# Patient Record
Sex: FEMALE | Race: WHITE | NOT HISPANIC OR LATINO | Employment: OTHER | URBAN - METROPOLITAN AREA
[De-identification: names, ages, dates, MRNs, and addresses within clinical notes are randomized per-mention and may not be internally consistent; named-entity substitution may affect disease eponyms.]

---

## 2017-03-16 ENCOUNTER — ALLSCRIPTS OFFICE VISIT (OUTPATIENT)
Dept: OTHER | Facility: OTHER | Age: 72
End: 2017-03-16

## 2017-03-22 ENCOUNTER — ALLSCRIPTS OFFICE VISIT (OUTPATIENT)
Dept: OTHER | Facility: OTHER | Age: 72
End: 2017-03-22

## 2017-03-22 DIAGNOSIS — M79.604 PAIN OF RIGHT LEG: ICD-10-CM

## 2017-03-22 DIAGNOSIS — M79.605 PAIN OF LEFT LEG: ICD-10-CM

## 2017-03-22 DIAGNOSIS — S89.91XA INJURY OF RIGHT LOWER LEG: ICD-10-CM

## 2017-03-23 ENCOUNTER — TRANSCRIBE ORDERS (OUTPATIENT)
Dept: ADMINISTRATIVE | Facility: HOSPITAL | Age: 72
End: 2017-03-23

## 2017-03-23 ENCOUNTER — HOSPITAL ENCOUNTER (OUTPATIENT)
Dept: RADIOLOGY | Facility: HOSPITAL | Age: 72
Discharge: HOME | End: 2017-03-23
Payer: MEDICARE

## 2017-03-23 DIAGNOSIS — M79.604 PAIN OF RIGHT LEG: ICD-10-CM

## 2017-03-23 DIAGNOSIS — S89.91XA INJURY OF RIGHT LOWER LEG: ICD-10-CM

## 2017-03-23 DIAGNOSIS — M79.605 PAIN OF LEFT LEG: ICD-10-CM

## 2017-03-23 PROCEDURE — 73590 X-RAY EXAM OF LOWER LEG: CPT

## 2017-03-23 PROCEDURE — 73564 X-RAY EXAM KNEE 4 OR MORE: CPT

## 2017-03-24 LAB
A/G RATIO (HISTORICAL): 1.8 (CALC) (ref 1–2.5)
ALBUMIN SERPL BCP-MCNC: 4.3 G/DL (ref 3.6–5.1)
ALP SERPL-CCNC: 93 U/L (ref 33–130)
ALT SERPL W P-5'-P-CCNC: 19 U/L (ref 6–29)
AST SERPL W P-5'-P-CCNC: 24 U/L (ref 10–35)
BASOPHILS # BLD AUTO: 0.6 %
BASOPHILS # BLD AUTO: 40 CELLS/UL (ref 0–200)
BILIRUB SERPL-MCNC: 0.6 MG/DL (ref 0.2–1.2)
BUN SERPL-MCNC: 37 MG/DL (ref 7–25)
BUN/CREA RATIO (HISTORICAL): 30 (CALC) (ref 6–22)
CALCIUM SERPL-MCNC: 9.5 MG/DL (ref 8.6–10.4)
CHLORIDE SERPL-SCNC: 111 MMOL/L (ref 98–110)
CO2 SERPL-SCNC: 20 MMOL/L (ref 20–31)
CREAT SERPL-MCNC: 1.24 MG/DL (ref 0.6–0.93)
DEPRECATED RDW RBC AUTO: 13.9 % (ref 11–15)
EGFR AFRICAN AMERICAN (HISTORICAL): 51 ML/MIN/1.73M2
EGFR-AMERICAN CALC (HISTORICAL): 44 ML/MIN/1.73M2
EOSINOPHIL # BLD AUTO: 178 CELLS/UL (ref 15–500)
EOSINOPHIL # BLD AUTO: 2.7 %
GAMMA GLOBULIN (HISTORICAL): 2.4 G/DL (CALC) (ref 1.9–3.7)
GLUCOSE (HISTORICAL): 93 MG/DL (ref 65–99)
HCT VFR BLD AUTO: 37.1 % (ref 35–45)
HGB BLD-MCNC: 12.3 G/DL (ref 11.7–15.5)
LYMPHOCYTES # BLD AUTO: 1505 CELLS/UL (ref 850–3900)
LYMPHOCYTES # BLD AUTO: 22.8 %
MCH RBC QN AUTO: 31.2 PG (ref 27–33)
MCHC RBC AUTO-ENTMCNC: 33 G/DL (ref 32–36)
MCV RBC AUTO: 94.6 FL (ref 80–100)
MONOCYTES # BLD AUTO: 403 CELLS/UL (ref 200–950)
MONOCYTES (HISTORICAL): 6.1 %
NEUTROPHILS # BLD AUTO: 4475 CELLS/UL (ref 1500–7800)
NEUTROPHILS # BLD AUTO: 67.8 %
PLATELET # BLD AUTO: 218 THOUSAND/UL (ref 140–400)
PMV BLD AUTO: 9.5 FL (ref 7.5–12.5)
POTASSIUM SERPL-SCNC: 4.4 MMOL/L (ref 3.5–5.3)
RBC # BLD AUTO: 3.92 MILLION/UL (ref 3.8–5.1)
SODIUM SERPL-SCNC: 141 MMOL/L (ref 135–146)
TOTAL PROTEIN (HISTORICAL): 6.7 G/DL (ref 6.1–8.1)
WBC # BLD AUTO: 6.6 THOUSAND/UL (ref 3.8–10.8)

## 2017-03-27 ENCOUNTER — GENERIC CONVERSION - ENCOUNTER (OUTPATIENT)
Dept: OTHER | Facility: OTHER | Age: 72
End: 2017-03-27

## 2017-04-16 ENCOUNTER — LAB CONVERSION - ENCOUNTER (OUTPATIENT)
Dept: OTHER | Facility: OTHER | Age: 72
End: 2017-04-16

## 2017-04-16 LAB
CULTURE RESULT (HISTORICAL): NORMAL
CULTURE RESULT (HISTORICAL): NORMAL
SHIGA TOXIN TEST (HISTORICAL): NORMAL

## 2017-04-17 ENCOUNTER — GENERIC CONVERSION - ENCOUNTER (OUTPATIENT)
Dept: OTHER | Facility: OTHER | Age: 72
End: 2017-04-17

## 2017-05-10 ENCOUNTER — ALLSCRIPTS OFFICE VISIT (OUTPATIENT)
Dept: OTHER | Facility: OTHER | Age: 72
End: 2017-05-10

## 2017-05-17 ENCOUNTER — ALLSCRIPTS OFFICE VISIT (OUTPATIENT)
Dept: OTHER | Facility: OTHER | Age: 72
End: 2017-05-17

## 2017-05-24 ENCOUNTER — ALLSCRIPTS OFFICE VISIT (OUTPATIENT)
Dept: OTHER | Facility: OTHER | Age: 72
End: 2017-05-24

## 2017-06-10 ENCOUNTER — APPOINTMENT (EMERGENCY)
Dept: RADIOLOGY | Facility: HOSPITAL | Age: 72
End: 2017-06-10
Payer: MEDICARE

## 2017-06-10 ENCOUNTER — HOSPITAL ENCOUNTER (EMERGENCY)
Facility: HOSPITAL | Age: 72
Discharge: HOME | End: 2017-06-10
Attending: EMERGENCY MEDICINE | Admitting: EMERGENCY MEDICINE
Payer: MEDICARE

## 2017-06-10 VITALS
RESPIRATION RATE: 18 BRPM | OXYGEN SATURATION: 100 % | WEIGHT: 192 LBS | HEIGHT: 64 IN | SYSTOLIC BLOOD PRESSURE: 111 MMHG | HEART RATE: 49 BPM | DIASTOLIC BLOOD PRESSURE: 55 MMHG | BODY MASS INDEX: 32.78 KG/M2 | TEMPERATURE: 97.2 F

## 2017-06-10 DIAGNOSIS — Z87.898 HISTORY OF TACHYCARDIA: Primary | ICD-10-CM

## 2017-06-10 DIAGNOSIS — R00.1 BRADYCARDIA: ICD-10-CM

## 2017-06-10 LAB
ALBUMIN SERPL BCP-MCNC: 3.6 G/DL (ref 3.5–5)
ALP SERPL-CCNC: 72 U/L (ref 46–116)
ALT SERPL W P-5'-P-CCNC: 26 U/L (ref 12–78)
ANION GAP SERPL CALCULATED.3IONS-SCNC: 9 MMOL/L (ref 4–13)
AST SERPL W P-5'-P-CCNC: 36 U/L (ref 5–45)
BASOPHILS # BLD AUTO: 0.04 THOUSANDS/ΜL (ref 0–0.1)
BASOPHILS NFR BLD AUTO: 1 % (ref 0–1)
BILIRUB SERPL-MCNC: 0.3 MG/DL (ref 0.2–1)
BUN SERPL-MCNC: 28 MG/DL (ref 5–25)
CALCIUM SERPL-MCNC: 9.3 MG/DL (ref 8.3–10.1)
CHLORIDE SERPL-SCNC: 108 MMOL/L (ref 100–108)
CO2 SERPL-SCNC: 23 MMOL/L (ref 21–32)
CREAT SERPL-MCNC: 1.13 MG/DL (ref 0.6–1.3)
EOSINOPHIL # BLD AUTO: 0.1 THOUSAND/ΜL (ref 0–0.61)
EOSINOPHIL NFR BLD AUTO: 2 % (ref 0–6)
ERYTHROCYTE [DISTWIDTH] IN BLOOD BY AUTOMATED COUNT: 13.2 % (ref 11.6–15.1)
GFR SERPL CREATININE-BSD FRML MDRD: 47.5 ML/MIN/1.73SQ M
GLUCOSE SERPL-MCNC: 103 MG/DL (ref 65–140)
HCT VFR BLD AUTO: 35.7 % (ref 34.8–46.1)
HGB BLD-MCNC: 11.9 G/DL (ref 11.5–15.4)
LYMPHOCYTES # BLD AUTO: 1.45 THOUSANDS/ΜL (ref 0.6–4.47)
LYMPHOCYTES NFR BLD AUTO: 26 % (ref 14–44)
MCH RBC QN AUTO: 31.4 PG (ref 26.8–34.3)
MCHC RBC AUTO-ENTMCNC: 33.3 G/DL (ref 31.4–37.4)
MCV RBC AUTO: 94 FL (ref 82–98)
MONOCYTES # BLD AUTO: 0.46 THOUSAND/ΜL (ref 0.17–1.22)
MONOCYTES NFR BLD AUTO: 8 % (ref 4–12)
NEUTROPHILS # BLD AUTO: 3.59 THOUSANDS/ΜL (ref 1.85–7.62)
NEUTS SEG NFR BLD AUTO: 63 % (ref 43–75)
NT-PROBNP SERPL-MCNC: 614 PG/ML
PLATELET # BLD AUTO: 176 THOUSANDS/UL (ref 149–390)
PMV BLD AUTO: 10.5 FL (ref 8.9–12.7)
POTASSIUM SERPL-SCNC: 6.1 MMOL/L (ref 3.5–5.3)
PROT SERPL-MCNC: 6.9 G/DL (ref 6.4–8.2)
RBC # BLD AUTO: 3.79 MILLION/UL (ref 3.81–5.12)
SODIUM SERPL-SCNC: 140 MMOL/L (ref 136–145)
TROPONIN I SERPL-MCNC: <0.02 NG/ML
WBC # BLD AUTO: 5.64 THOUSAND/UL (ref 4.31–10.16)

## 2017-06-10 PROCEDURE — 93005 ELECTROCARDIOGRAM TRACING: CPT

## 2017-06-10 PROCEDURE — 71010 HB CHEST X-RAY 1 VIEW FRONTAL (PORTABLE): CPT

## 2017-06-10 PROCEDURE — 83880 ASSAY OF NATRIURETIC PEPTIDE: CPT | Performed by: EMERGENCY MEDICINE

## 2017-06-10 PROCEDURE — 99285 EMERGENCY DEPT VISIT HI MDM: CPT

## 2017-06-10 PROCEDURE — 80053 COMPREHEN METABOLIC PANEL: CPT | Performed by: EMERGENCY MEDICINE

## 2017-06-10 PROCEDURE — 84484 ASSAY OF TROPONIN QUANT: CPT | Performed by: EMERGENCY MEDICINE

## 2017-06-10 PROCEDURE — 36415 COLL VENOUS BLD VENIPUNCTURE: CPT | Performed by: EMERGENCY MEDICINE

## 2017-06-10 PROCEDURE — 85025 COMPLETE CBC W/AUTO DIFF WBC: CPT | Performed by: EMERGENCY MEDICINE

## 2017-06-10 RX ORDER — LISINOPRIL 20 MG/1
TABLET ORAL
COMMUNITY
End: 2018-05-02 | Stop reason: SDUPTHER

## 2017-06-10 RX ORDER — INDAPAMIDE 2.5 MG/1
TABLET, FILM COATED ORAL
COMMUNITY
End: 2018-08-13 | Stop reason: SDUPTHER

## 2017-06-10 RX ORDER — IBUPROFEN 800 MG/1
TABLET ORAL
COMMUNITY
End: 2018-07-17 | Stop reason: SDUPTHER

## 2017-06-10 RX ORDER — CITALOPRAM 10 MG/1
TABLET ORAL
COMMUNITY
Start: 2017-02-16 | End: 2018-02-06 | Stop reason: SDUPTHER

## 2017-06-10 RX ORDER — LORAZEPAM 0.5 MG/1
TABLET ORAL
COMMUNITY
Start: 2015-10-26 | End: 2019-02-27 | Stop reason: ALTCHOICE

## 2017-06-11 LAB
ATRIAL RATE: 46 BPM
P AXIS: 29 DEGREES
PR INTERVAL: 146 MS
QRS AXIS: 54 DEGREES
QRSD INTERVAL: 74 MS
QT INTERVAL: 428 MS
QTC INTERVAL: 374 MS
T WAVE AXIS: 56 DEGREES
VENTRICULAR RATE: 46 BPM

## 2017-07-19 ENCOUNTER — GENERIC CONVERSION - ENCOUNTER (OUTPATIENT)
Dept: OTHER | Facility: OTHER | Age: 72
End: 2017-07-19

## 2017-09-28 ENCOUNTER — GENERIC CONVERSION - ENCOUNTER (OUTPATIENT)
Dept: OTHER | Facility: OTHER | Age: 72
End: 2017-09-28

## 2017-11-02 ENCOUNTER — GENERIC CONVERSION - ENCOUNTER (OUTPATIENT)
Dept: OTHER | Facility: OTHER | Age: 72
End: 2017-11-02

## 2017-11-02 ENCOUNTER — ALLSCRIPTS OFFICE VISIT (OUTPATIENT)
Dept: OTHER | Facility: OTHER | Age: 72
End: 2017-11-02

## 2017-11-02 DIAGNOSIS — Z12.31 ENCOUNTER FOR SCREENING MAMMOGRAM FOR MALIGNANT NEOPLASM OF BREAST: ICD-10-CM

## 2017-11-02 DIAGNOSIS — Z13.820 ENCOUNTER FOR SCREENING FOR OSTEOPOROSIS: ICD-10-CM

## 2017-11-08 ENCOUNTER — GENERIC CONVERSION - ENCOUNTER (OUTPATIENT)
Dept: OTHER | Facility: OTHER | Age: 72
End: 2017-11-08

## 2017-11-08 ENCOUNTER — LAB CONVERSION - ENCOUNTER (OUTPATIENT)
Dept: OTHER | Facility: OTHER | Age: 72
End: 2017-11-08

## 2017-11-08 LAB
A/G RATIO (HISTORICAL): 1.7 (CALC) (ref 1–2.5)
ALBUMIN SERPL BCP-MCNC: 3.9 G/DL (ref 3.6–5.1)
ALP SERPL-CCNC: 72 U/L (ref 33–130)
ALT SERPL W P-5'-P-CCNC: 16 U/L (ref 6–29)
AST SERPL W P-5'-P-CCNC: 22 U/L (ref 10–35)
BILIRUB SERPL-MCNC: 0.5 MG/DL (ref 0.2–1.2)
BUN SERPL-MCNC: 29 MG/DL (ref 7–25)
BUN/CREA RATIO (HISTORICAL): 24 (CALC) (ref 6–22)
CALCIUM SERPL-MCNC: 8.9 MG/DL (ref 8.6–10.4)
CHLORIDE SERPL-SCNC: 110 MMOL/L (ref 98–110)
CO2 SERPL-SCNC: 23 MMOL/L (ref 20–31)
CREAT SERPL-MCNC: 1.19 MG/DL (ref 0.6–0.93)
EGFR AFRICAN AMERICAN (HISTORICAL): 53 ML/MIN/1.73M2
EGFR-AMERICAN CALC (HISTORICAL): 46 ML/MIN/1.73M2
GAMMA GLOBULIN (HISTORICAL): 2.3 G/DL (CALC) (ref 1.9–3.7)
GLUCOSE (HISTORICAL): 82 MG/DL (ref 65–99)
HEPATITIS C ANTIBODY (HISTORICAL): NORMAL
POTASSIUM SERPL-SCNC: 5 MMOL/L (ref 3.5–5.3)
SIGNAL TO CUT-OFF (HISTORICAL): 0.02
SODIUM SERPL-SCNC: 141 MMOL/L (ref 135–146)
TOTAL PROTEIN (HISTORICAL): 6.2 G/DL (ref 6.1–8.1)
TSH SERPL DL<=0.05 MIU/L-ACNC: 2.67 MIU/L (ref 0.4–4.5)

## 2017-11-28 ENCOUNTER — HOSPITAL ENCOUNTER (OUTPATIENT)
Dept: BONE DENSITY | Facility: IMAGING CENTER | Age: 72
Discharge: HOME | End: 2017-11-28
Payer: MEDICARE

## 2017-11-28 DIAGNOSIS — Z12.31 ENCOUNTER FOR SCREENING MAMMOGRAM FOR MALIGNANT NEOPLASM OF BREAST: ICD-10-CM

## 2017-11-28 DIAGNOSIS — Z13.820 ENCOUNTER FOR SCREENING FOR OSTEOPOROSIS: ICD-10-CM

## 2017-11-28 PROCEDURE — 77080 DXA BONE DENSITY AXIAL: CPT

## 2017-11-28 PROCEDURE — G0202 SCR MAMMO BI INCL CAD: HCPCS

## 2017-11-29 ENCOUNTER — GENERIC CONVERSION - ENCOUNTER (OUTPATIENT)
Dept: OTHER | Facility: OTHER | Age: 72
End: 2017-11-29

## 2018-01-11 NOTE — RESULT NOTES
Verified Results  (Q) CULTURE, STOOL, SAL/SHIG/CAMPY AND SHIGA TOXINS EIA W/RFL E COLI O157 CULT 12Apr2017 11:52AM [facility]     Test Name Result Flag Reference   SHIGA TOXINS, EIA W/RFL$TO E COLI O157 CULTURE      SHIGA TOXINS, EIA W/RFL TO E COLI O157 CULTURE         MICRO NUMBER:      [id number]    TEST STATUS:       FINAL    SPECIMEN SOURCE:   STOOL    SPECIMEN QUALITY:  ADEQUATE    RESULT:            Not Detected   CULTURE, STOOL, BASHIR/SHIG/CAMPY AND SHIGA TOXINS EIA W/RFL E COLI O157 CULT      CAMPYLOBACTER, CULTURE         MICRO NUMBER:      [id number]    TEST STATUS:       FINAL    SPECIMEN SOURCE:   STOOL    SPECIMEN QUALITY:  ADEQUATE    RESULT:            No enteric Campylobacter isolated   CULTURE, STOOL, BASHIR/SHIG/CAMPY AND SHIGA TOXINS EIA W/RFL E COLI O157 CULT      SALMONELLA AND SHIGELLA, CULTURE         MICRO NUMBER:      [id number]    TEST STATUS:       FINAL    SPECIMEN SOURCE:   STOOL    SPECIMEN QUALITY:  ADEQUATE    RESULT:            No Salmonella or Shigella isolated

## 2018-01-12 VITALS
DIASTOLIC BLOOD PRESSURE: 69 MMHG | BODY MASS INDEX: 33.32 KG/M2 | WEIGHT: 200 LBS | HEART RATE: 57 BPM | HEIGHT: 65 IN | SYSTOLIC BLOOD PRESSURE: 130 MMHG

## 2018-01-12 NOTE — MISCELLANEOUS
Message  KELLY CUI HAS BILATERAL KNEE OSTEOARTHRITIS AND SPINAL STENOSIS, BOTH OF WHICH CAUSE HER PAIN WITH PROLONGED STANDING  IT IS OF MY OPINION THAT SHE WOULD BE BEST ACCOMMODATED WITH USE OF A STOOL AS NEEDED WHILE SHE IS WORKING         Signatures   Electronically signed by : Lexy Urena, ; Jul 19 2017  2:17PM EST                       (Author)    Electronically signed by : Kike Membreno MD; Jul 19 2017  2:20PM EST                       (Author)

## 2018-01-12 NOTE — RESULT NOTES
Verified Results  (1) URINE CULTURE [id number] 12:00AM Delaine Dakins     Test Name Result Flag Reference   CULTURE, URINE, ROUTINE  A    CULTURE, URINE, ROUTINE         MICRO NUMBER:      [id number]    TEST STATUS:       FINAL    SPECIMEN SOURCE:   URINE    SPECIMEN QUALITY:  ADEQUATE    RESULT:            50,000-100,000 CFU/mL of Escherichia coli                            E coli                            ----------------                            INT   DELIA     AMOX/CLAVULANATE       S     <=2 0     AMPICILLIN             S     <=2 0     AMP/SULBACTAM          S     <=2 0     CEFAZOLIN              NR    <=4 0 **1     CEFEPIME               S     <=1 0     CEFTRIAXONE            S     <=1 0     CIPROFLOXACIN          S     <=0 25     ERTAPENEM              S     <=0 5     GENTAMICIN             S     <=1 0     IMIPENEM               S     <=0 25     LEVOFLOXACIN           S     <=0 12     NITROFURANTOIN         S     <=16 0     PIP/TAZOBACTAM         S     <=4 0     TOBRAMYCIN             S     <=1 0     TRIMETHOPRIM/SULFA     S     <=20 0  S=Susceptible  I=Intermediate  R=Resistant  * = Not Tested  NR = Not Reported  **NN = See Therapy Comments  THERAPY COMMENTS      Note 1:      ORAL therapy: A cefazolin DELIA of < 32 predicts      susceptibility to the oral agents cefaclor,      cefdinir, cefpodoxime, cefprozil, cefuroxime,      cephalexin, and loracarbef when used for therapy      of uncomplicated UTIs due to E  coli,      K  pneumoniae, and P  mirabilis  PARENTERAL therapy: A cefazolin DELIA of > 8      indicates resistance to parenteral cefazolin  An alternate test method must be performed to      to confirm susceptibility to parenteral cefazolin

## 2018-01-13 VITALS
DIASTOLIC BLOOD PRESSURE: 74 MMHG | SYSTOLIC BLOOD PRESSURE: 144 MMHG | HEIGHT: 65 IN | WEIGHT: 194 LBS | BODY MASS INDEX: 32.32 KG/M2 | HEART RATE: 54 BPM

## 2018-01-13 VITALS
SYSTOLIC BLOOD PRESSURE: 144 MMHG | WEIGHT: 194 LBS | BODY MASS INDEX: 32.32 KG/M2 | HEIGHT: 65 IN | DIASTOLIC BLOOD PRESSURE: 70 MMHG | HEART RATE: 61 BPM

## 2018-01-13 VITALS
WEIGHT: 194 LBS | DIASTOLIC BLOOD PRESSURE: 80 MMHG | BODY MASS INDEX: 32.32 KG/M2 | HEIGHT: 65 IN | SYSTOLIC BLOOD PRESSURE: 147 MMHG | HEART RATE: 59 BPM

## 2018-01-14 VITALS
BODY MASS INDEX: 32.32 KG/M2 | HEART RATE: 66 BPM | WEIGHT: 194 LBS | HEIGHT: 65 IN | SYSTOLIC BLOOD PRESSURE: 120 MMHG | RESPIRATION RATE: 16 BRPM | DIASTOLIC BLOOD PRESSURE: 60 MMHG | OXYGEN SATURATION: 98 %

## 2018-01-14 NOTE — RESULT NOTES
Message   no growth in urine     Verified Results  (1) URINE CULTURE [id number] 12:00AM [facility]     Test Name Result Flag Reference   CULTURE, URINE, ROUTINE      CULTURE, URINE, ROUTINE         MICRO NUMBER:      [id number]    TEST STATUS:       FINAL    SPECIMEN SOURCE:   URINE    SPECIMEN QUALITY:  ADEQUATE    RESULT:            No Growth

## 2018-01-15 NOTE — RESULT NOTES
Verified Results  * MAMMO SCREENING BILATERAL W CAD [id number] 02:54PM Ty Barger Order Number: [id number]    - Patient Instructions: To schedule this appointment, please contact Central Scheduling at [phone]  Do not wear any perfume, powder, lotion or deodorant on breast or underarm area  Please bring your doctors order, referral (if needed) and insurance information with you on the day of the test  Failure to bring this information may result in this test being rescheduled  Arrive 15 minutes prior to your appointment time to register  On the day of your test, please bring any prior mammogram or breast studies with you that were not performed at a [facility]  Failure to bring prior exams may result in your test needing to be rescheduled  Test Name Result Flag Reference   MAMMO SCREENING BILATERAL W CAD (Report)     Patient History:   Patient is postmenopausal    Family history of breast cancer in mother  Patient has never smoked  Patient's BMI is 34 8  Reason for exam: screening, asymptomatic  Mammo Screening Bilateral W CAD: November 28, 2017 - Check In #:    [de-identified]   Bilateral CC and MLO view(s) were taken  Technologist: JAMEY Wilkinson (R)(M)   No prior studies available for comparison  The breast tissue is almost entirely fat  Bilateral digital mammography was performed  No dominant soft    tissue mass, architectural distortion or suspicious    calcifications are noted in either breast  The skin and nipple    contours are within normal limits  No evidence of malignancy  No significant changes when compared with prior studies  ACR BI-RADSï¾® Assessments: BiRad:1 - Negative     Recommendation:   Routine screening mammogram of both breasts in 1 year  Analyzed by CAD     The patient is scheduled in a reminder system for screening    mammography  8-10% of cancers will be missed on mammography   Management of a    palpable abnormality must be based on clinical grounds  Patients   will be notified of their results via letter from our facility  Accredited by [employer] of [employer] and FDA  Transcription Location: JAMEY Bradley 98: [id number]     Risk Value(s):   Tyrer-Cuzick 10 Year: 6 000%, Tyrer-Cuzick Lifetime: 8 600%,    Myriad Table: 1 5%, JORJE 5 Year: 3 3%, NCI Lifetime: 9 1%   Signed by:   Ghada Cervantes MD   11/28/17     * DXA BONE DENSITY SPINE HIP AND PELVIS [id number] 02:54PM Don Easton Order Number: [id number]    - Patient Instructions: To schedule this appointment, please contact Central Scheduling at [phone]  Test Name Result Flag Reference   DXA BONE DENSITY SPINE HIP AND PELVIS (Report)     CENTRAL DXA SCAN     CLINICAL HISTORY:  71year old post-menopausal  female risk factors include family history of maternal hip fracture  Personal history of shoulder fracture  Osteoporosis screening  TECHNIQUE: Bone densitometry was performed using a Horizon A bone densitometer  Regions of interest appear properly placed  There are no obvious fractures or other confounding variables which could limit the study  Degenerative changes of the lumbar    spine and hip  This will falsely elevate the bone mineral densities in these regions  COMPARISON: None  RESULTS:    LUMBAR SPINE: L1-L4:   BMD 1 572 gm/cm2   T-score 4 8   Z-score 7 0     LEFT TOTAL HIP:   BMD 0 806 gm/cm2   T-score -1 1   Z-score 0 5     LEFT FEMORAL NECK:   BMD 0 563 gm/cm2   T-score -2 6   Z-score -0 7             IMPRESSION:   1  Based on the [facility] classification, the T-score of -2 6 in the left hip is consistent with OSTEOPOROSIS  Although not part of the [facility] classification, the presence of a fragility fracture (stress fracture) in conjunction with a bone density examination    demonstrating osteoporosis, should be considered diagnostic of SEVERE OSTEOPOROSIS     2  According to the [facility], prescription therapy is recommended with a T-score of -2 5 or less in the spine or hip after appropriate evaluation to exclude secondary causes  3  A daily intake of at least 1200 mg Calcium and 800 to 1000 IU of Vitamin D, as well as weight bearing and muscle strengthening exercise, fall prevention and avoidance of tobacco and excessive alcohol intake as basic preventive measures are    suggested  4  Repeat DXA in 18 - 24 months, on the same machine, as clinically indicated  The 10 year risk of hip fracture is 15%, with the 10 year risk of major osteoporotic fracture being 34%, as calculated by the [facility] fracture risk assessment tool (FRAX)  The current NOF guidelines recommend treating patients with FRAX 10 year risk score    of >3% for hip fracture and >20% for major osteoporotic fracture  WHO CLASSIFICATION:   Normal (a T-score of -1 0 or higher)   Low bone mineral density (a T-score of less than -1 0 but higher than -2 5)   Osteoporosis (a T-score of -2 5 or less)   Severe osteoporosis (a T-score of -2 5 or less with a fragility fracture)             Workstation performed: [id number]     Signed by:   Grayson Huntley DO   11/29/17       Plan  Screening for osteoporosis    · * DXA BONE DENSITY SPINE HIP AND PELVIS ; every 15 years;  Last [id number];  Status:Active

## 2018-01-16 NOTE — RESULT NOTES
Verified Results  * XR TIBIA FIBULA 2 VIEW LEFT 23Mar2017 10:54AM [employer] Order Number: [id number]     Test Name Result Flag Reference   XR TIBIA FIBULA 2 VW LEFT (Report)     LEFT TIBIA AND FIBULA     INDICATION: Left tibia and fibula pain  Fall  COMPARISON: March 18, 2016     VIEWS: AP and lateral     IMAGES: 4     FINDINGS:     There is no acute fracture or dislocation  Advanced tricompartmental left knee osteoarthritis is again noted  Plantar and Achilles calcaneal spurs are seen  No lytic or blastic lesions are seen  Soft tissues are unremarkable  IMPRESSION:     No acute osseous abnormality  Advanced tricompartmental left knee osteoarthritis  Workstation performed: [id number]     Signed by:   Flori Richards MD   3/24/17     * XR TIBIA FIBULA 2 VIEW RIGHT 23Mar2017 10:54AM [employer] Order Number: [id number]     Test Name Result Flag Reference   XR TIBIA FIBULA 2 VW RIGHT (Report)     RIGHT TIBIA AND FIBULA     INDICATION: Fall  Bilateral lower leg pain  COMPARISON: None     VIEWS: AP and lateral     IMAGES: 4     FINDINGS:     There is no acute fracture or dislocation  Moderate tricompartmental right knee osteoarthritis  Degenerative changes noted in the ankle and visualized foot  Scattered     No lytic or blastic lesions are seen  Soft tissues are unremarkable  IMPRESSION:     No acute osseous abnormality  Degenerative changes involving the right knee and right ankle  Workstation performed: [id number]     Signed by:   Flori Richards MD   3/24/17     * XR KNEE 4+ VW RIGHT INJURY [id number] 10:54AM [employer] Order Number: [id number]     Test Name Result Flag Reference   XR KNEE 4+ VW RIGHT (Report)     RIGHT KNEE     INDICATION: Right knee pain  COMPARISON: March 18, 2016     VIEWS: 4     IMAGES: 4     FINDINGS:     There is no acute fracture or dislocation  There is no joint effusion       Moderate tricompartmental osteoarthritic degenerative change as evidenced by joint space narrowing, osteophyte formation and subchondral sclerosis, similar from previous examination  Extensive meniscal chondrocalcinosis is again noted  No lytic or blastic lesions are seen  Soft tissues are unremarkable  IMPRESSION:     Moderate tricompartmental right knee osteophytes, similar from previous examination  Workstation performed: [id number]     Signed by:   Bonilla Becerril MD   3/24/17     (1) CBC/PLT/DIFF [id number] 10:16AM [facility]   REPORT COMMENT:  FASTING:UNKNOWN     Test Name Result Flag Reference   WHITE BLOOD CELL COUNT 6 6 Thousand/uL  3 8-10 8   RED BLOOD CELL COUNT 3 92 Million/uL  3 80-5 10   HEMOGLOBIN 12 3 g/dL  11 7-15 5   HEMATOCRIT 37 1 %  35 0-45 0   MCV 94 6 fL  80 0-100 0   MCH 31 2 pg  27 0-33 0   MCHC 33 0 g/dL  32 0-36 0   RDW 13 9 %  11 0-15 0   PLATELET COUNT 998 Thousand/uL  140-400   MPV 9 5 fL  7 5-12 5   ABSOLUTE NEUTROPHILS 4475 cells/uL  0686-8216   ABSOLUTE LYMPHOCYTES 1505 cells/uL  850-3900   ABSOLUTE MONOCYTES 403 cells/uL  200-950   ABSOLUTE EOSINOPHILS 178 cells/uL     ABSOLUTE BASOPHILS 40 cells/uL  0-200   NEUTROPHILS 67 8 %     LYMPHOCYTES 22 8 %     MONOCYTES 6 1 %     EOSINOPHILS 2 7 %     BASOPHILS 0 6 %       (1) COMPREHENSIVE METABOLIC PANEL [id number] 88:42EE Jose Elias Varela     Test Name Result Flag Reference   GLUCOSE 93 mg/dL  65-99   Fasting reference interval   UREA NITROGEN (BUN) 37 mg/dL H 7-25   CREATININE 1 24 mg/dL H 0 60-0 93   For patients >52years of age, the reference limit  for Creatinine is approximately 13% higher for people  identified as -American  eGFR NON-AFR   AMERICAN 44 mL/min/1 73m2 L > OR = 60   eGFR AFRICAN AMERICAN 51 mL/min/1 73m2 L > OR = 60   BUN/CREATININE RATIO 30 (calc) H 6-22   SODIUM 141 mmol/L  135-146   POTASSIUM 4 4 mmol/L  3 5-5 3   CHLORIDE 111 mmol/L H    CARBON DIOXIDE 20 mmol/L  20-31   CALCIUM 9 5 mg/dL  8 6-10 4 PROTEIN, TOTAL 6 7 g/dL  6 1-8 1   ALBUMIN 4 3 g/dL  3 6-5 1   GLOBULIN 2 4 g/dL (calc)  1 9-3 7   ALBUMIN/GLOBULIN RATIO 1 8 (calc)  1 0-2 5   BILIRUBIN, TOTAL 0 6 mg/dL  0 2-1 2   ALKALINE PHOSPHATASE 93 U/L     AST 24 U/L  10-35   ALT 19 U/L  6-29

## 2018-01-16 NOTE — RESULT NOTES
Verified Results  (1) CBC/PLT/DIFF [id number] 01:49PM [facility]   REPORT COMMENT:  FASTING:UNKNOWN     Test Name Result Flag Reference   WHITE BLOOD CELL COUNT 5 9 Thousand/uL  3 8-10 8   RED BLOOD CELL COUNT 3 98 Million/uL  3 80-5 10   HEMOGLOBIN 12 2 g/dL  11 7-15 5   HEMATOCRIT 37 1 %  35 0-45 0   MCV 93 4 fL  80 0-100 0   MCH 30 6 pg  27 0-33 0   MCHC 32 8 g/dL  32 0-36 0   RDW 14 1 %  11 0-15 0   PLATELET COUNT 058 Thousand/uL  140-400   MPV 9 5 fL  7 5-11 5   ABSOLUTE NEUTROPHILS 3865 cells/uL  8174-9954   ABSOLUTE LYMPHOCYTES 1522 cells/uL  850-3900   ABSOLUTE MONOCYTES 378 cells/uL  200-950   ABSOLUTE EOSINOPHILS 94 cells/uL     ABSOLUTE BASOPHILS 41 cells/uL  0-200   NEUTROPHILS 65 5 %     LYMPHOCYTES 25 8 %     MONOCYTES 6 4 %     EOSINOPHILS 1 6 %     BASOPHILS 0 7 %       (1) COMPREHENSIVE METABOLIC PANEL [id number] 25:26PN Shireen Gann     Test Name Result Flag Reference   GLUCOSE 80 mg/dL  65-99   Fasting reference interval   UREA NITROGEN (BUN) 27 mg/dL H 7-25   CREATININE 1 20 mg/dL H 0 60-0 93   For patients >52years of age, the reference limit  for Creatinine is approximately 13% higher for people  identified as -American  eGFR NON-AFR   AMERICAN 46 mL/min/1 73m2 L > OR = 60   eGFR AFRICAN AMERICAN 53 mL/min/1 73m2 L > OR = 60   BUN/CREATININE RATIO 23 (calc) H 6-22   SODIUM 134 mmol/L L 135-146   POTASSIUM 4 2 mmol/L  3 5-5 3   CHLORIDE 104 mmol/L     CARBON DIOXIDE 18 mmol/L L 19-30   CALCIUM 9 3 mg/dL  8 6-10 4   PROTEIN, TOTAL 6 6 g/dL  6 1-8 1   ALBUMIN 4 1 g/dL  3 6-5 1   GLOBULIN 2 5 g/dL (calc)  1 9-3 7   ALBUMIN/GLOBULIN RATIO 1 6 (calc)  1 0-2 5   BILIRUBIN, TOTAL 0 6 mg/dL  0 2-1 2   ALKALINE PHOSPHATASE 83 U/L     AST 24 U/L  10-35   ALT 16 U/L  6-29     (1) IRON [id number] 01:49PM Shireen Gann     Test Name Result Flag Reference   IRON, TOTAL 113 mcg/dL       (Q) TSH, 3RD GENERATION W/REFLEX TO FT4 [id number] 01:49PM Shireen TENORIO COMMENT:  FASTING:UNKNOWN     Test Name Result Flag Reference   TSH W/REFLEX TO FT4 1 82 mIU/L  0 40-4 50

## 2018-01-18 NOTE — RESULT NOTES
Verified Results  (1) COMPREHENSIVE METABOLIC PANEL [id number] 74:53PA Bora Formica     Test Name Result Flag Reference   GLUCOSE 82 mg/dL  65-99   Fasting reference interval   UREA NITROGEN (BUN) 29 mg/dL H 7-25   CREATININE 1 19 mg/dL H 0 60-0 93   For patients >52years of age, the reference limit  for Creatinine is approximately 13% higher for people  identified as -American  eGFR NON-AFR   AMERICAN 46 mL/min/1 73m2 L > OR = 60   eGFR AFRICAN AMERICAN 53 mL/min/1 73m2 L > OR = 60   BUN/CREATININE RATIO 24 (calc) H 6-22   SODIUM 141 mmol/L  135-146   POTASSIUM 5 0 mmol/L  3 5-5 3   CHLORIDE 110 mmol/L     CARBON DIOXIDE 23 mmol/L  20-31   CALCIUM 8 9 mg/dL  8 6-10 4   PROTEIN, TOTAL 6 2 g/dL  6 1-8 1   ALBUMIN 3 9 g/dL  3 6-5 1   GLOBULIN 2 3 g/dL (calc)  1 9-3 7   ALBUMIN/GLOBULIN RATIO 1 7 (calc)  1 0-2 5   BILIRUBIN, TOTAL 0 5 mg/dL  0 2-1 2   ALKALINE PHOSPHATASE 72 U/L     AST 22 U/L  10-35   ALT 16 U/L  6-29     (Q) HEPATITIS C ANTIBODY [id number] 08:16AM Bora Formica     Test Name Result Flag Reference   HEPATITIS C ANTIBODY NON-REACTIVE  NON-REACTIVE   SIGNAL TO CUT-OFF 0 02  <1 00     (Q) TSH, 3RD GENERATION [id number] 08:16AM Bora Formica   REPORT COMMENT:  FASTING:YES     Test Name Result Flag Reference   TSH 2 67 mIU/L  0 40-4 50

## 2018-01-22 VITALS
HEIGHT: 65 IN | DIASTOLIC BLOOD PRESSURE: 78 MMHG | HEART RATE: 68 BPM | WEIGHT: 198 LBS | SYSTOLIC BLOOD PRESSURE: 150 MMHG | BODY MASS INDEX: 32.99 KG/M2

## 2018-01-22 VITALS
HEART RATE: 70 BPM | DIASTOLIC BLOOD PRESSURE: 80 MMHG | HEIGHT: 65 IN | BODY MASS INDEX: 33.15 KG/M2 | WEIGHT: 199 LBS | RESPIRATION RATE: 16 BRPM | OXYGEN SATURATION: 97 % | SYSTOLIC BLOOD PRESSURE: 140 MMHG

## 2018-02-06 ENCOUNTER — OFFICE VISIT (OUTPATIENT)
Dept: OBGYN CLINIC | Facility: CLINIC | Age: 73
End: 2018-02-06
Payer: MEDICARE

## 2018-02-06 VITALS — HEART RATE: 68 BPM | HEIGHT: 61 IN | SYSTOLIC BLOOD PRESSURE: 128 MMHG | DIASTOLIC BLOOD PRESSURE: 84 MMHG

## 2018-02-06 DIAGNOSIS — F32.A DEPRESSION, UNSPECIFIED DEPRESSION TYPE: Primary | ICD-10-CM

## 2018-02-06 DIAGNOSIS — M17.12 PRIMARY LOCALIZED OSTEOARTHRITIS OF LEFT KNEE: ICD-10-CM

## 2018-02-06 DIAGNOSIS — M17.11 PRIMARY LOCALIZED OSTEOARTHRITIS OF RIGHT KNEE: Primary | ICD-10-CM

## 2018-02-06 PROCEDURE — 99213 OFFICE O/P EST LOW 20 MIN: CPT | Performed by: ORTHOPAEDIC SURGERY

## 2018-02-06 PROCEDURE — 20610 DRAIN/INJ JOINT/BURSA W/O US: CPT | Performed by: PHYSICIAN ASSISTANT

## 2018-02-06 RX ORDER — LIDOCAINE HYDROCHLORIDE 10 MG/ML
7 INJECTION, SOLUTION INFILTRATION; PERINEURAL
Status: COMPLETED | OUTPATIENT
Start: 2018-02-06 | End: 2018-02-06

## 2018-02-06 RX ORDER — BETAMETHASONE SODIUM PHOSPHATE AND BETAMETHASONE ACETATE 3; 3 MG/ML; MG/ML
6 INJECTION, SUSPENSION INTRA-ARTICULAR; INTRALESIONAL; INTRAMUSCULAR; SOFT TISSUE
Status: COMPLETED | OUTPATIENT
Start: 2018-02-06 | End: 2018-02-06

## 2018-02-06 RX ORDER — CITALOPRAM 10 MG/1
10 TABLET ORAL DAILY
Qty: 30 TABLET | Refills: 5 | Status: SHIPPED | OUTPATIENT
Start: 2018-02-06 | End: 2018-08-12 | Stop reason: SDUPTHER

## 2018-02-06 RX ADMIN — LIDOCAINE HYDROCHLORIDE 7 ML: 10 INJECTION, SOLUTION INFILTRATION; PERINEURAL at 15:09

## 2018-02-06 RX ADMIN — BETAMETHASONE SODIUM PHOSPHATE AND BETAMETHASONE ACETATE 6 MG: 3; 3 INJECTION, SUSPENSION INTRA-ARTICULAR; INTRALESIONAL; INTRAMUSCULAR; SOFT TISSUE at 15:09

## 2018-02-06 NOTE — PROGRESS NOTES
Assessment:     1  Primary localized osteoarthritis of right knee    2  Primary localized osteoarthritis of left knee        Plan:     Problem List Items Addressed This Visit        Musculoskeletal and Integument    Primary localized osteoarthritis of right knee - Primary     Findings and treatment options were discussed with the patient  Repeat cortisone injection was given to the right knee joint today  Patient tolerated the procedure well  Advised to apply cold compress today  Follow-up as needed if symptoms return  Primary localized osteoarthritis of left knee     Repeat cortisone injection was given to the left knee joint today  Follow-up as needed if symptoms return  Plan discussed with Dr Marlon Hernandez  Subjective:     Patient ID: Kingsley Mcdaniel is a 68 y o  female  Chief Complaint:  Follow-up bilateral knee pain  HPI   This is a 52-year-old white female who is following up for bilateral knee DJD  The last cortisone injections were given in September 2017  The right knee injection lasted for about 3 months and left knee injection lasted for a few days  She would like repeat injections today  Allergy:  Allergies   Allergen Reactions    Meperidine Nausea Only     Other reaction(s): Nausea    Hydrocodone Rash     Medications:  all current active meds have been reviewed     Past Medical History:  Past Medical History:   Diagnosis Date    Hyperlipidemia     Hypertension     Primary localized osteoarthritis of right knee 2/6/2018    Psychiatric disorder      Past Surgical History:  History reviewed  No pertinent surgical history  Family History:  Family History   Problem Relation Age of Onset    No Known Problems Mother     No Known Problems Father      Social History:  History   Alcohol Use No     History   Drug Use No     History   Smoking Status    Never Smoker   Smokeless Tobacco    Never Used     Review of Systems   Constitutional: Negative  HENT: Negative      Eyes: Negative  Respiratory: Negative  Cardiovascular: Negative  Gastrointestinal: Negative  Endocrine: Negative  Genitourinary: Negative  Musculoskeletal: Positive for arthralgias  Skin: Negative  Allergic/Immunologic: Negative  Hematological: Negative  Psychiatric/Behavioral: Negative  Objective:  BP Readings from Last 1 Encounters:   02/06/18 128/84      Wt Readings from Last 1 Encounters:   11/02/17 90 3 kg (199 lb)      BMI:   Estimated body mass index is 33 12 kg/m² as calculated from the following:    Height as of 11/2/17: 5' 5" (1 651 m)  Weight as of 11/2/17: 90 3 kg (199 lb)  BSA:   Estimated body surface area is 1 97 meters squared as calculated from the following:    Height as of 11/2/17: 5' 5" (1 651 m)  Weight as of 11/2/17: 90 3 kg (199 lb)  Physical Exam   Constitutional: She is oriented to person, place, and time  She appears well-developed  HENT:   Head: Normocephalic and atraumatic  Eyes: EOM are normal  Pupils are equal, round, and reactive to light  Neck: Neck supple  Musculoskeletal: She exhibits tenderness  Right knee: She exhibits no effusion  Left knee: She exhibits no effusion  Neurological: She is alert and oriented to person, place, and time  Skin: Skin is warm  Psychiatric: She has a normal mood and affect  Nursing note and vitals reviewed  Right Knee Exam     Tenderness   The patient is experiencing tenderness in the medial joint line and lateral joint line  Range of Motion   The patient has normal right knee ROM  Muscle Strength     The patient has normal right knee strength      Tests   Varus: negative  Valgus: negative    Other   Erythema: absent  Scars: absent  Sensation: normal  Pulse: present  Swelling: mild  Other tests: no effusion present    Comments:  Patellofemoral crepitation      Left Knee Exam     Tenderness   The patient is experiencing tenderness in the medial joint line and lateral joint line     Range of Motion   The patient has normal left knee ROM  Muscle Strength     The patient has normal left knee strength  Tests   Varus: negative  Valgus: negative    Other   Erythema: absent  Scars: absent  Sensation: normal  Pulse: present  Swelling: mild  Effusion: no effusion present    Comments:  Patellofemoral crepitation            Large joint arthrocentesis  Date/Time: 2/6/2018 3:09 PM  Consent given by: patient  Supporting Documentation  Indications: pain   Procedure Details  Location: knee - Knee joint: bilateral   Preparation: alcohol    Needle size: 22 G  Ultrasound guidance: no  Approach: anterolateral  Medications administered: 7 mL lidocaine 1 %; 6 mg betamethasone acetate-betamethasone sodium phosphate 6 (3-3) mg/mL    Patient tolerance: patient tolerated the procedure well with no immediate complications  Dressing:  Sterile dressing applied

## 2018-02-06 NOTE — ASSESSMENT & PLAN NOTE
Findings and treatment options were discussed with the patient  Repeat cortisone injection was given to the right knee joint today  Patient tolerated the procedure well  Advised to apply cold compress today  Follow-up as needed if symptoms return

## 2018-02-06 NOTE — LETTER
February 6, 2018     Patient: Linda Torre   YOB: 1945   Date of Visit: 2/6/2018       To Whom it May Concern:    Migdalia Rodríguez is under my professional care  She was seen in my office on 2/6/2018  She was treated for her bilateral knees  If you have any questions or concerns, please don't hesitate to call           Sincerely,          Mynor Trejo MD        CC: No Recipients

## 2018-02-06 NOTE — ASSESSMENT & PLAN NOTE
Repeat cortisone injection was given to the left knee joint today  Follow-up as needed if symptoms return

## 2018-02-19 ENCOUNTER — TELEPHONE (OUTPATIENT)
Dept: FAMILY MEDICINE CLINIC | Facility: CLINIC | Age: 73
End: 2018-02-19

## 2018-02-19 NOTE — LETTER
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

## 2018-05-02 DIAGNOSIS — I10 ESSENTIAL HYPERTENSION: Primary | ICD-10-CM

## 2018-05-02 RX ORDER — LISINOPRIL 20 MG/1
TABLET ORAL
Qty: 90 TABLET | Refills: 1 | Status: SHIPPED | OUTPATIENT
Start: 2018-05-02 | End: 2018-10-16 | Stop reason: SDUPTHER

## 2018-07-17 DIAGNOSIS — G89.4 CHRONIC PAIN SYNDROME: Primary | ICD-10-CM

## 2018-07-17 RX ORDER — IBUPROFEN 800 MG/1
TABLET ORAL
Qty: 270 TABLET | Refills: 1 | Status: SHIPPED | OUTPATIENT
Start: 2018-07-17 | End: 2019-01-22 | Stop reason: SDUPTHER

## 2018-08-12 DIAGNOSIS — F32.A DEPRESSION, UNSPECIFIED DEPRESSION TYPE: ICD-10-CM

## 2018-08-12 RX ORDER — INDAPAMIDE 2.5 MG/1
TABLET, FILM COATED ORAL
Qty: 90 TABLET | Refills: 1 | Status: CANCELLED | OUTPATIENT
Start: 2018-08-12

## 2018-08-13 DIAGNOSIS — I10 ESSENTIAL HYPERTENSION: Primary | ICD-10-CM

## 2018-08-13 RX ORDER — INDAPAMIDE 2.5 MG/1
2.5 TABLET, FILM COATED ORAL DAILY
Qty: 30 TABLET | Refills: 5 | Status: SHIPPED | OUTPATIENT
Start: 2018-08-13 | End: 2019-01-30 | Stop reason: SDUPTHER

## 2018-08-13 RX ORDER — CITALOPRAM 10 MG/1
TABLET ORAL
Qty: 30 TABLET | Refills: 0 | Status: SHIPPED | OUTPATIENT
Start: 2018-08-13 | End: 2018-09-11 | Stop reason: SDUPTHER

## 2018-08-17 ENCOUNTER — OFFICE VISIT (OUTPATIENT)
Dept: OBGYN CLINIC | Facility: CLINIC | Age: 73
End: 2018-08-17
Payer: MEDICARE

## 2018-08-17 VITALS
WEIGHT: 210 LBS | BODY MASS INDEX: 35.85 KG/M2 | SYSTOLIC BLOOD PRESSURE: 144 MMHG | HEIGHT: 64 IN | HEART RATE: 78 BPM | DIASTOLIC BLOOD PRESSURE: 80 MMHG

## 2018-08-17 DIAGNOSIS — M17.11 PRIMARY LOCALIZED OSTEOARTHRITIS OF RIGHT KNEE: ICD-10-CM

## 2018-08-17 DIAGNOSIS — M17.12 PRIMARY LOCALIZED OSTEOARTHRITIS OF LEFT KNEE: Primary | ICD-10-CM

## 2018-08-17 PROCEDURE — 99213 OFFICE O/P EST LOW 20 MIN: CPT | Performed by: ORTHOPAEDIC SURGERY

## 2018-08-17 NOTE — PROGRESS NOTES
Assessment:     1  Primary localized osteoarthritis of left knee    2  Primary localized osteoarthritis of right knee        Plan:     Problem List Items Addressed This Visit        Musculoskeletal and Integument    Primary localized osteoarthritis of right knee    Relevant Orders    Injection procedure prior authorization    Primary localized osteoarthritis of left knee - Primary    Relevant Orders    Injection procedure prior authorization          Findings consistent with bilateral knee osteoarthritis  Discussed findings and treatment options with the patient  I discussed treatment options and patient would like to proceed with physical supplement injections  We will contact her insurance company to get precertification of the medication  We will contact patient once the medication is approved  I advised patient to use Aspercreme over the knee  She should also continue taking the NSAIDs  All patient's questions were answered to her satisfaction  This note is created using dictation transcription  It may contain typographical errors, grammatical errors, improperly dictated words, background noise and other errors  Subjective:     Patient ID: Belem Dillard is a 68 y o  female  Chief Complaint:  Follow-up bilateral knee pain  80-year-old white female follow-up bilateral knee osteoarthritis  Patient had cortisone injection done in February 2018  Her right knee pain did improve for couple months but the left knee did not have any relief  She continued complaining of diffuse pain in both knees  She has been taking Motrin 800 mg 3 times a day and is not helping  She is complaining of aching pain with occasional sharp pain in the knee when she ambulates  She denies locking or giving way sensations  She is not considering surgical treatment at this point         Allergy:  Allergies   Allergen Reactions    Meperidine Nausea Only     Other reaction(s): Nausea    Hydrocodone Rash     Medications:  all current active meds have been reviewed     Past Medical History:  Past Medical History:   Diagnosis Date    Hyperlipidemia     Hypertension     Primary localized osteoarthritis of right knee 2/6/2018    Psychiatric disorder      Past Surgical History:  History reviewed  No pertinent surgical history  Family History:  Family History   Problem Relation Age of Onset    No Known Problems Mother     No Known Problems Father      Social History:  History   Alcohol Use No     History   Drug Use No     History   Smoking Status    Never Smoker   Smokeless Tobacco    Never Used     Review of Systems   Constitutional: Negative  HENT: Negative  Eyes: Negative  Respiratory: Negative  Cardiovascular: Negative  Gastrointestinal: Negative  Endocrine: Negative  Genitourinary: Negative  Musculoskeletal: Positive for arthralgias (Bilateral knee) and joint swelling (Left knee)  Negative for gait problem  Skin: Negative  Allergic/Immunologic: Negative  Neurological: Negative  Hematological: Negative  Psychiatric/Behavioral: Negative  Objective:  BP Readings from Last 1 Encounters:   08/17/18 144/80      Wt Readings from Last 1 Encounters:   08/17/18 95 3 kg (210 lb)      BMI:   Estimated body mass index is 36 05 kg/m² as calculated from the following:    Height as of this encounter: 5' 4" (1 626 m)  Weight as of this encounter: 95 3 kg (210 lb)  BSA:   Estimated body surface area is 2 meters squared as calculated from the following:    Height as of this encounter: 5' 4" (1 626 m)  Weight as of this encounter: 95 3 kg (210 lb)  Physical Exam   Constitutional: She is oriented to person, place, and time  She appears well-developed and well-nourished  HENT:   Head: Normocephalic and atraumatic  Eyes: EOM are normal  Pupils are equal, round, and reactive to light  Neck: Neck supple  Musculoskeletal:        Right knee: She exhibits no effusion          Left knee: She exhibits effusion (Grade 1)  Neurological: She is alert and oriented to person, place, and time  Skin: Skin is warm  Psychiatric: She has a normal mood and affect  Nursing note and vitals reviewed  Right Knee Exam     Tenderness   The patient is experiencing tenderness in the medial joint line and lateral joint line  Range of Motion   The patient has normal right knee ROM  Muscle Strength     The patient has normal right knee strength  Tests   Smith:  Medial - negative Lateral - negative  Varus: negative  Valgus: negative  Patellar Apprehension: negative    Other   Erythema: absent  Scars: absent  Sensation: normal  Pulse: present  Swelling: mild  Other tests: no effusion present    Comments:  Patellofemoral crepitation      Left Knee Exam     Tenderness   The patient is experiencing tenderness in the medial joint line and lateral joint line  Range of Motion   The patient has normal left knee ROM  Muscle Strength     The patient has normal left knee strength      Tests   Smith:  Medial - positive Lateral - negative  Varus: negative  Valgus: negative  Patellar Apprehension: negative    Other   Erythema: absent  Scars: absent  Sensation: normal  Pulse: present  Swelling: mild  Effusion: effusion (Grade 1) present    Comments:  Patellofemoral crepitation            Procedures

## 2018-09-11 DIAGNOSIS — F32.A DEPRESSION, UNSPECIFIED DEPRESSION TYPE: ICD-10-CM

## 2018-09-12 RX ORDER — CITALOPRAM 10 MG/1
TABLET ORAL
Qty: 90 TABLET | Refills: 0 | Status: SHIPPED | OUTPATIENT
Start: 2018-09-12 | End: 2018-12-19 | Stop reason: SDUPTHER

## 2018-10-03 ENCOUNTER — TELEPHONE (OUTPATIENT)
Dept: OBGYN CLINIC | Facility: HOSPITAL | Age: 73
End: 2018-10-03

## 2018-10-03 NOTE — TELEPHONE ENCOUNTER
TO BE COMPLETED BY CENTRAL AUTH TEAM:     Physician: Ang KILPATRICK     Medication: [address]     Number of Injections in Series (Appointments scheduled 1 week apart from one another): 3    Schedule after this date: ASAP    Billing Info: Buy and Bill/Specialty Pharmacy-Patient Supply  OFFICE MAY BUY & BILL  Appointment Message Line: LM FOR PATIENT TO CALL AND SCHEDULE 3 SHOT SERIES , VOICEMAIL CUT OFF     Additional Comments:        TO BE COMPLETED BY :     Office location appointment scheduled:     Date of First Appointment scheduled:      Additional Comments:

## 2018-10-16 DIAGNOSIS — I10 ESSENTIAL HYPERTENSION: ICD-10-CM

## 2018-10-17 RX ORDER — LISINOPRIL 20 MG/1
TABLET ORAL
Qty: 90 TABLET | Refills: 1 | Status: SHIPPED | OUTPATIENT
Start: 2018-10-17 | End: 2019-04-26 | Stop reason: SDUPTHER

## 2018-11-01 NOTE — TELEPHONE ENCOUNTER
TO BE COMPLETED BY :     Office location appointment scheduled:  [address] [place] [address]     Date of First Appointment scheduled:   11/6/18 @ 9:15am with Dr Jayde Bonner  11/13/18 @ 9:15am with Dr Jayde Bonner  11/20/18 @ 9:15am with Collin Alert          Additional Comments:

## 2018-11-02 ENCOUNTER — TELEPHONE (OUTPATIENT)
Dept: OBGYN CLINIC | Facility: HOSPITAL | Age: 73
End: 2018-11-02

## 2018-11-02 NOTE — TELEPHONE ENCOUNTER
TO BE COMPLETED BY CENTRAL AUTH TEAM:     Physician: DR Chichi Parker     Medication: [address]    Number of Injections in Series (Appointments scheduled 1 week apart from one another):  1    Schedule after this date:  3-5 DAYS FROM CALL BACK DATE    Billing Info: Buy and Bill/Specialty Pharmacy-Patient Supply   OFFICE [address] [facility]     Appointment message:    B/L KNEE ORTHOVISC #1-3 (BUY & BILL)    (please copy and paste appointment message line when scheduling appointment)    Additional Comments: [id number]    LEFT [facility] TO SCHEDULE B/L KNEE VISCO INJECTIONS

## 2018-11-06 ENCOUNTER — APPOINTMENT (OUTPATIENT)
Dept: RADIOLOGY | Facility: CLINIC | Age: 73
End: 2018-11-06
Payer: MEDICARE

## 2018-11-06 ENCOUNTER — OFFICE VISIT (OUTPATIENT)
Dept: OBGYN CLINIC | Facility: CLINIC | Age: 73
End: 2018-11-06
Payer: MEDICARE

## 2018-11-06 VITALS
HEIGHT: 64 IN | WEIGHT: 210 LBS | HEART RATE: 82 BPM | BODY MASS INDEX: 35.85 KG/M2 | SYSTOLIC BLOOD PRESSURE: 124 MMHG | DIASTOLIC BLOOD PRESSURE: 80 MMHG

## 2018-11-06 DIAGNOSIS — S39.92XA INJURY OF BACK, INITIAL ENCOUNTER: ICD-10-CM

## 2018-11-06 DIAGNOSIS — M17.11 PRIMARY LOCALIZED OSTEOARTHRITIS OF RIGHT KNEE: Primary | ICD-10-CM

## 2018-11-06 DIAGNOSIS — M17.12 PRIMARY LOCALIZED OSTEOARTHRITIS OF LEFT KNEE: ICD-10-CM

## 2018-11-06 DIAGNOSIS — M47.816 LUMBAR SPONDYLOSIS: ICD-10-CM

## 2018-11-06 PROCEDURE — 20610 DRAIN/INJ JOINT/BURSA W/O US: CPT | Performed by: PHYSICIAN ASSISTANT

## 2018-11-06 PROCEDURE — 72110 X-RAY EXAM L-2 SPINE 4/>VWS: CPT

## 2018-11-06 PROCEDURE — 99213 OFFICE O/P EST LOW 20 MIN: CPT | Performed by: PHYSICIAN ASSISTANT

## 2018-11-06 NOTE — PROGRESS NOTES
Assessment:     1  Primary localized osteoarthritis of right knee    2  Primary localized osteoarthritis of left knee    3  Lumbar spondylosis        Plan:     Problem List Items Addressed This Visit        Musculoskeletal and Integument    Primary localized osteoarthritis of right knee - Primary    Relevant Medications    sodium hyaluronate (ORTHOVISC) injection SOSY 30 mg (Completed)    Other Relevant Orders    Large joint arthrocentesis (Completed)    Primary localized osteoarthritis of left knee    Relevant Medications    sodium hyaluronate (ORTHOVISC) injection SOSY 30 mg (Completed)    Other Relevant Orders    Large joint arthrocentesis (Completed)    Lumbar spondylosis    Relevant Orders    XR spine lumbar minimum 4 views non injury    Ambulatory referral to Physical Therapy          The patient was seen and examined by Dr Roxy Vences and myself  Findings consistent with bilateral knee osteoarthritis and lumbar spondylosis  Discussed findings and treatment options with the patient  Findings and treatment options were discussed with the patient and x-rays were reviewed with her  Recommend formal physical therapy for the lumbar spine  Offered LSO brace, the patient declined  Recommend use of walker instead of 2 canes for more balance  She was also given the 1st of 3 bilateral knee Orthovisc injections  She tolerated the procedure well  Advised to apply cold compress today  Follow-up in 1 week for the 2nd injections  All questions were answered to patient's satisfaction  Subjective:     Patient ID: Sary Martinez is a 68 y o  female  Chief Complaint:  Follow-up bilateral knee pain  78-year-old white female follow-up bilateral knee osteoarthritis  She is here for the 1st of 3 bilateral knee Orthovisc injections  She is also complaining new lumbar spine pain that started about 3 weeks ago  She states she suffered 3 separate falls were she landed on her side and has been having increased pain since then  She denies any numbness or tingling radiating down her legs  She denies any bladder or bowel incontinence  She does have occasional pain that radiates from her right buttock to the posterior aspect of her thigh that she has had prior to these falls  No recent treatment  Allergy:  Allergies   Allergen Reactions    Meperidine Nausea Only     Other reaction(s): Nausea    Hydrocodone Rash     Medications:  all current active meds have been reviewed     Past Medical History:  Past Medical History:   Diagnosis Date    Hyperlipidemia     Hypertension     Primary localized osteoarthritis of right knee 2/6/2018    Psychiatric disorder      Past Surgical History:  History reviewed  No pertinent surgical history  Family History:  Family History   Problem Relation Age of Onset    No Known Problems Mother     No Known Problems Father      Social History:  History   Alcohol Use No     History   Drug Use No     History   Smoking Status    Never Smoker   Smokeless Tobacco    Never Used     Review of Systems   Constitutional: Negative  HENT: Negative  Eyes: Negative  Respiratory: Negative  Cardiovascular: Negative  Gastrointestinal: Negative  Endocrine: Negative  Genitourinary: Negative  Musculoskeletal: Positive for arthralgias (Bilateral knee), back pain, gait problem and joint swelling (Left knee)  Skin: Negative  Allergic/Immunologic: Negative  Hematological: Negative  Psychiatric/Behavioral: Negative  Objective:  BP Readings from Last 1 Encounters:   11/06/18 124/80      Wt Readings from Last 1 Encounters:   11/06/18 95 3 kg (210 lb)      BMI:   Estimated body mass index is 36 05 kg/m² as calculated from the following:    Height as of this encounter: 5' 4" (1 626 m)  Weight as of this encounter: 95 3 kg (210 lb)  BSA:   Estimated body surface area is 2 meters squared as calculated from the following:    Height as of this encounter: 5' 4" (1 626 m)      Weight as of this encounter: 95 3 kg (210 lb)  Physical Exam   Constitutional: She is oriented to person, place, and time  She appears well-developed and well-nourished  HENT:   Head: Normocephalic and atraumatic  Eyes: Pupils are equal, round, and reactive to light  EOM are normal    Neck: Neck supple  Musculoskeletal:        Right knee: She exhibits no effusion  Left knee: She exhibits effusion (Grade 1)  Neurological: She is alert and oriented to person, place, and time  Skin: Skin is warm  Psychiatric: She has a normal mood and affect  Nursing note and vitals reviewed  Right Knee Exam     Tenderness   The patient is experiencing tenderness in the medial joint line and lateral joint line  Range of Motion   The patient has normal right knee ROM  Muscle Strength     The patient has normal right knee strength  Tests   Smith:  Medial - negative Lateral - negative  Varus: negative  Valgus: negative  Patellar Apprehension: negative    Other   Erythema: absent  Scars: absent  Sensation: normal  Pulse: present  Swelling: mild  Other tests: no effusion present    Comments:  Patellofemoral crepitation      Left Knee Exam     Tenderness   The patient is experiencing tenderness in the medial joint line and lateral joint line  Range of Motion   The patient has normal left knee ROM  Muscle Strength     The patient has normal left knee strength  Tests   Smith:  Medial - positive Lateral - negative  Varus: negative  Valgus: negative  Patellar Apprehension: negative    Other   Erythema: absent  Scars: absent  Sensation: normal  Pulse: present  Swelling: mild  Effusion: effusion (Grade 1) present    Comments:  Patellofemoral crepitation      Back Exam     Tenderness   The patient is experiencing tenderness in the lumbar      Range of Motion   Extension:  30 abnormal   Flexion:  60 abnormal   Lateral Bend Right: normal   Lateral Bend Left: normal   Rotation Right: normal   Rotation Left: normal     Muscle Strength   The patient has normal back strength      Tests   Straight leg raise right: negative  Straight leg raise left: negative    Reflexes   Patellar: normal  Achilles: normal    Other   Sensation: normal  Gait: antalgic   Erythema: no back redness  Scars: absent            Large joint arthrocentesis  Date/Time: 11/6/2018 9:54 AM  Consent given by: patient  Site marked: site marked  Timeout: Immediately prior to procedure a time out was called to verify the correct patient, procedure, equipment, support staff and site/side marked as required   Supporting Documentation  Indications: pain   Procedure Details  Location: knee - R knee  Preparation: Patient was prepped and draped in the usual sterile fashion  Needle size: 22 G  Approach: anterolateral  Medications administered: 30 mg sodium hyaluronate 30 mg/2 mL    Patient tolerance: patient tolerated the procedure well with no immediate complications  Dressing:  Sterile dressing applied  Large joint arthrocentesis  Date/Time: 11/6/2018 9:55 AM  Consent given by: patient  Supporting Documentation  Indications: pain   Procedure Details  Location: knee - L knee  Preparation: Patient was prepped and draped in the usual sterile fashion  Needle size: 22 G  Approach: anterolateral  Medications administered: 30 mg sodium hyaluronate 30 mg/2 mL    Patient tolerance: patient tolerated the procedure well with no immediate complications  Dressing:  Sterile dressing applied

## 2018-11-07 DIAGNOSIS — Z12.31 SCREENING MAMMOGRAM, ENCOUNTER FOR: Primary | ICD-10-CM

## 2018-11-07 DIAGNOSIS — Z13.6 SCREENING FOR CARDIOVASCULAR CONDITION: Primary | ICD-10-CM

## 2018-11-09 LAB
ALBUMIN SERPL-MCNC: 3.9 G/DL (ref 3.6–5.1)
ALBUMIN/GLOB SERPL: 1.5 (CALC) (ref 1–2.5)
ALP SERPL-CCNC: 72 U/L (ref 33–130)
ALT SERPL-CCNC: 13 U/L (ref 6–29)
AST SERPL-CCNC: 18 U/L (ref 10–35)
BILIRUB SERPL-MCNC: 0.5 MG/DL (ref 0.2–1.2)
BUN SERPL-MCNC: 27 MG/DL (ref 7–25)
BUN/CREAT SERPL: 19 (CALC) (ref 6–22)
CALCIUM SERPL-MCNC: 9.3 MG/DL (ref 8.6–10.4)
CHLORIDE SERPL-SCNC: 111 MMOL/L (ref 98–110)
CHOLEST SERPL-MCNC: 210 MG/DL
CHOLEST/HDLC SERPL: 3.9 (CALC)
CO2 SERPL-SCNC: 23 MMOL/L (ref 20–32)
CREAT SERPL-MCNC: 1.41 MG/DL (ref 0.6–0.93)
GLOBULIN SER CALC-MCNC: 2.6 G/DL (CALC) (ref 1.9–3.7)
GLUCOSE SERPL-MCNC: 85 MG/DL (ref 65–99)
HDLC SERPL-MCNC: 54 MG/DL
LDLC SERPL CALC-MCNC: 130 MG/DL (CALC)
NONHDLC SERPL-MCNC: 156 MG/DL (CALC)
POTASSIUM SERPL-SCNC: 4.7 MMOL/L (ref 3.5–5.3)
PROT SERPL-MCNC: 6.5 G/DL (ref 6.1–8.1)
SL AMB EGFR AFRICAN AMERICAN: 43 ML/MIN/1.73M2
SL AMB EGFR NON AFRICAN AMERICAN: 37 ML/MIN/1.73M2
SODIUM SERPL-SCNC: 141 MMOL/L (ref 135–146)
TRIGL SERPL-MCNC: 138 MG/DL

## 2018-11-12 NOTE — PROGRESS NOTES
Call patient with lab result-cholesterol is okay  There is slight decreasing kidney function  She should increase her fluids  I would advise repeating this in 2-3 months along with a microalbumin of the urine

## 2018-11-13 ENCOUNTER — OFFICE VISIT (OUTPATIENT)
Dept: OBGYN CLINIC | Facility: CLINIC | Age: 73
End: 2018-11-13
Payer: MEDICARE

## 2018-11-13 VITALS
WEIGHT: 210 LBS | HEIGHT: 64 IN | HEART RATE: 78 BPM | BODY MASS INDEX: 35.85 KG/M2 | DIASTOLIC BLOOD PRESSURE: 80 MMHG | SYSTOLIC BLOOD PRESSURE: 142 MMHG

## 2018-11-13 DIAGNOSIS — M17.11 PRIMARY LOCALIZED OSTEOARTHRITIS OF RIGHT KNEE: Primary | ICD-10-CM

## 2018-11-13 DIAGNOSIS — M17.12 PRIMARY LOCALIZED OSTEOARTHRITIS OF LEFT KNEE: ICD-10-CM

## 2018-11-13 PROCEDURE — 20610 DRAIN/INJ JOINT/BURSA W/O US: CPT | Performed by: PHYSICIAN ASSISTANT

## 2018-11-13 NOTE — PROGRESS NOTES
Assessment:     1  Primary localized osteoarthritis of right knee    2  Primary localized osteoarthritis of left knee        Plan:     Problem List Items Addressed This Visit        Musculoskeletal and Integument    Primary localized osteoarthritis of right knee - Primary    Relevant Medications    sodium hyaluronate (ORTHOVISC) injection SOSY 30 mg (Completed)    Other Relevant Orders    Large joint arthrocentesis (Completed)    Primary localized osteoarthritis of left knee    Relevant Medications    sodium hyaluronate (ORTHOVISC) injection SOSY 30 mg (Completed)    Other Relevant Orders    Large joint arthrocentesis (Completed)          Findings consistent with bilateral knee DJD  The 2nd of 3 bilateral knee Orthovisc injections was given today  She tolerated the procedure well  Advised to apply cold compress today  Follow-up in 1 week for the 3rd injections  All questions were answered to patient's satisfaction  Subjective:     Patient ID: Sary Martinez is a 68 y o  female  Chief Complaint:  Follow-up bilateral knee pain  78-year-old white female follow-up bilateral knee osteoarthritis  She is here for the 2nd of 3 bilateral knee Orthovisc injections  No issues after the 1st injections  Allergy:  Allergies   Allergen Reactions    Meperidine Nausea Only     Other reaction(s): Nausea    Hydrocodone Rash     Medications:  all current active meds have been reviewed     Past Medical History:  Past Medical History:   Diagnosis Date    Hyperlipidemia     Hypertension     Primary localized osteoarthritis of right knee 2/6/2018    Psychiatric disorder      Past Surgical History:  History reviewed  No pertinent surgical history    Family History:  Family History   Problem Relation Age of Onset    No Known Problems Mother     No Known Problems Father      Social History:  History   Alcohol Use No     History   Drug Use No     History   Smoking Status    Never Smoker   Smokeless Tobacco    Never Used     Review of Systems   Constitutional: Negative  HENT: Negative  Eyes: Negative  Respiratory: Negative  Cardiovascular: Negative  Gastrointestinal: Negative  Endocrine: Negative  Genitourinary: Negative  Musculoskeletal: Positive for arthralgias (Bilateral knee), back pain, gait problem and joint swelling (Left knee)  Skin: Negative  Allergic/Immunologic: Negative  Hematological: Negative  Psychiatric/Behavioral: Negative  Objective:  BP Readings from Last 1 Encounters:   11/13/18 142/80      Wt Readings from Last 1 Encounters:   11/13/18 95 3 kg (210 lb)      BMI:   Estimated body mass index is 36 05 kg/m² as calculated from the following:    Height as of this encounter: 5' 4" (1 626 m)  Weight as of this encounter: 95 3 kg (210 lb)  BSA:   Estimated body surface area is 2 meters squared as calculated from the following:    Height as of this encounter: 5' 4" (1 626 m)  Weight as of this encounter: 95 3 kg (210 lb)  Physical Exam   Constitutional: She is oriented to person, place, and time  She appears well-developed and well-nourished  HENT:   Head: Normocephalic and atraumatic  Eyes: Pupils are equal, round, and reactive to light  EOM are normal    Neck: Neck supple  Musculoskeletal:        Right knee: She exhibits no effusion  Left knee: She exhibits effusion (Grade 1)  Neurological: She is alert and oriented to person, place, and time  Skin: Skin is warm  Psychiatric: She has a normal mood and affect  Nursing note and vitals reviewed  Right Knee Exam     Tenderness   The patient is experiencing tenderness in the medial joint line and lateral joint line  Range of Motion   The patient has normal right knee ROM  Muscle Strength     The patient has normal right knee strength      Tests   Smith:  Medial - negative Lateral - negative  Varus: negative  Valgus: negative  Patellar Apprehension: negative    Other   Erythema: absent  Scars: absent  Sensation: normal  Pulse: present  Swelling: mild  Other tests: no effusion present    Comments:  Patellofemoral crepitation      Left Knee Exam     Tenderness   The patient is experiencing tenderness in the medial joint line and lateral joint line  Range of Motion   The patient has normal left knee ROM  Muscle Strength     The patient has normal left knee strength      Tests   Smith:  Medial - positive Lateral - negative  Varus: negative  Valgus: negative  Patellar Apprehension: negative    Other   Erythema: absent  Scars: absent  Sensation: normal  Pulse: present  Swelling: mild  Effusion: effusion (Grade 1) present    Comments:  Patellofemoral crepitation            Large joint arthrocentesis  Date/Time: 11/13/2018 8:54 AM  Consent given by: patient  Site marked: site marked  Timeout: Immediately prior to procedure a time out was called to verify the correct patient, procedure, equipment, support staff and site/side marked as required   Supporting Documentation  Indications: pain   Procedure Details  Location: knee - R knee  Preparation: Patient was prepped and draped in the usual sterile fashion  Needle size: 22 G  Approach: anterolateral  Medications administered: 30 mg sodium hyaluronate 30 mg/2 mL    Patient tolerance: patient tolerated the procedure well with no immediate complications  Dressing:  Sterile dressing applied  Large joint arthrocentesis  Date/Time: 11/13/2018 8:56 AM  Consent given by: patient  Site marked: site marked  Timeout: Immediately prior to procedure a time out was called to verify the correct patient, procedure, equipment, support staff and site/side marked as required   Supporting Documentation  Indications: pain   Procedure Details  Location: knee - L knee  Preparation: Patient was prepped and draped in the usual sterile fashion  Needle size: 22 G  Approach: anterolateral  Medications administered: 30 mg sodium hyaluronate 30 mg/2 mL    Patient tolerance: patient tolerated the procedure well with no immediate complications  Dressing:  Sterile dressing applied

## 2018-11-14 ENCOUNTER — TELEPHONE (OUTPATIENT)
Dept: FAMILY MEDICINE CLINIC | Facility: CLINIC | Age: 73
End: 2018-11-14

## 2018-11-14 DIAGNOSIS — I10 HYPERTENSION, UNSPECIFIED TYPE: ICD-10-CM

## 2018-11-14 DIAGNOSIS — N28.9 FUNCTION KIDNEY DECREASED: Primary | ICD-10-CM

## 2018-11-14 NOTE — TELEPHONE ENCOUNTER
Patient called for lab results, provided as per Dr Kevin Barclay  Will return to [facility] in 2-3 months -- order placed for CMP and MicroAlbumin       ----- Message from Rossi Cordova MD sent at 11/12/2018 12:54 PM EST -----  Call patient with lab result-cholesterol is okay  There is slight decreasing kidney function  She should increase her fluids  I would advise repeating this in 2-3 months along with a microalbumin of the urine

## 2018-11-20 ENCOUNTER — OFFICE VISIT (OUTPATIENT)
Dept: OBGYN CLINIC | Facility: CLINIC | Age: 73
End: 2018-11-20
Payer: MEDICARE

## 2018-11-20 VITALS
WEIGHT: 210 LBS | HEIGHT: 64 IN | DIASTOLIC BLOOD PRESSURE: 80 MMHG | BODY MASS INDEX: 35.85 KG/M2 | HEART RATE: 72 BPM | SYSTOLIC BLOOD PRESSURE: 132 MMHG

## 2018-11-20 DIAGNOSIS — M17.11 PRIMARY LOCALIZED OSTEOARTHRITIS OF RIGHT KNEE: Primary | ICD-10-CM

## 2018-11-20 DIAGNOSIS — M17.12 PRIMARY LOCALIZED OSTEOARTHRITIS OF LEFT KNEE: ICD-10-CM

## 2018-11-20 PROCEDURE — 20610 DRAIN/INJ JOINT/BURSA W/O US: CPT | Performed by: PHYSICIAN ASSISTANT

## 2018-11-20 NOTE — PROGRESS NOTES
Assessment:     1  Primary localized osteoarthritis of right knee    2  Primary localized osteoarthritis of left knee        Plan:     Problem List Items Addressed This Visit        Musculoskeletal and Integument    Primary localized osteoarthritis of right knee - Primary    Relevant Orders    Large joint arthrocentesis    Primary localized osteoarthritis of left knee    Relevant Orders    Large joint arthrocentesis          Findings consistent with bilateral knee DJD  The 3rd of 3 bilateral knee Orthovisc injections was given today  She tolerated the procedure well  Advised to apply cold compress today  Follow-up in 3 months with Dr Natasha Locke for re-evaluation  All questions were answered to patient's satisfaction  Subjective:     Patient ID: Gelacio Casillas is a 68 y o  female  Chief Complaint:  Follow-up bilateral knee pain  71-year-old white female follow-up bilateral knee osteoarthritis  She is here for the 3rd of 3 bilateral knee Orthovisc injections  She had some increased soreness in her left knee after the 2nd injection that resolved in 1-2 days  Allergy:  Allergies   Allergen Reactions    Meperidine Nausea Only     Other reaction(s): Nausea    Hydrocodone Rash     Medications:  all current active meds have been reviewed     Past Medical History:  Past Medical History:   Diagnosis Date    Hyperlipidemia     Hypertension     Primary localized osteoarthritis of right knee 2/6/2018    Psychiatric disorder      Past Surgical History:  History reviewed  No pertinent surgical history  Family History:  Family History   Problem Relation Age of Onset    No Known Problems Mother     No Known Problems Father      Social History:  History   Alcohol Use No     History   Drug Use No     History   Smoking Status    Never Smoker   Smokeless Tobacco    Never Used     Review of Systems   Constitutional: Negative  HENT: Negative  Eyes: Negative  Respiratory: Negative      Cardiovascular: Negative  Gastrointestinal: Negative  Endocrine: Negative  Genitourinary: Negative  Musculoskeletal: Positive for arthralgias (Bilateral knee), back pain, gait problem and joint swelling (Left knee)  Skin: Negative  Allergic/Immunologic: Negative  Hematological: Negative  Psychiatric/Behavioral: Negative  Objective:  BP Readings from Last 1 Encounters:   11/20/18 132/80      Wt Readings from Last 1 Encounters:   11/20/18 95 3 kg (210 lb)      BMI:   Estimated body mass index is 36 05 kg/m² as calculated from the following:    Height as of this encounter: 5' 4" (1 626 m)  Weight as of this encounter: 95 3 kg (210 lb)  BSA:   Estimated body surface area is 2 meters squared as calculated from the following:    Height as of this encounter: 5' 4" (1 626 m)  Weight as of this encounter: 95 3 kg (210 lb)  Physical Exam   Constitutional: She is oriented to person, place, and time  She appears well-developed and well-nourished  HENT:   Head: Normocephalic and atraumatic  Eyes: Pupils are equal, round, and reactive to light  EOM are normal    Neck: Neck supple  Musculoskeletal:        Right knee: She exhibits no effusion  Left knee: She exhibits effusion (Grade 1)  Neurological: She is alert and oriented to person, place, and time  Skin: Skin is warm  Psychiatric: She has a normal mood and affect  Nursing note and vitals reviewed  Right Knee Exam     Tenderness   The patient is experiencing tenderness in the medial joint line and lateral joint line  Range of Motion   The patient has normal right knee ROM  Muscle Strength     The patient has normal right knee strength      Tests   Smith:  Medial - negative Lateral - negative  Varus: negative  Valgus: negative  Patellar Apprehension: negative    Other   Erythema: absent  Scars: absent  Sensation: normal  Pulse: present  Swelling: mild  Other tests: no effusion present    Comments:  Patellofemoral crepitation      Left Knee Exam     Tenderness   The patient is experiencing tenderness in the medial joint line and lateral joint line  Range of Motion   The patient has normal left knee ROM  Muscle Strength     The patient has normal left knee strength      Tests   Smith:  Medial - positive Lateral - negative  Varus: negative  Valgus: negative  Patellar Apprehension: negative    Other   Erythema: absent  Scars: absent  Sensation: normal  Pulse: present  Swelling: mild  Effusion: effusion (Grade 1) present    Comments:  Patellofemoral crepitation            Large joint arthrocentesis  Date/Time: 11/20/2018 8:37 AM  Consent given by: patient  Site marked: site marked  Timeout: Immediately prior to procedure a time out was called to verify the correct patient, procedure, equipment, support staff and site/side marked as required   Supporting Documentation  Indications: pain   Procedure Details  Location: knee - R knee  Preparation: Patient was prepped and draped in the usual sterile fashion  Needle size: 22 G  Approach: anterolateral  Medications administered: 30 mg sodium hyaluronate 30 mg/2 mL    Patient tolerance: patient tolerated the procedure well with no immediate complications  Dressing:  Sterile dressing applied  Large joint arthrocentesis  Date/Time: 11/20/2018 8:38 AM  Consent given by: patient  Supporting Documentation  Indications: pain   Procedure Details  Location: knee - L knee  Preparation: Patient was prepped and draped in the usual sterile fashion  Needle size: 22 G  Approach: anterolateral  Medications administered: 30 mg sodium hyaluronate 30 mg/2 mL    Patient tolerance: patient tolerated the procedure well with no immediate complications  Dressing:  Sterile dressing applied

## 2018-12-18 ENCOUNTER — HOSPITAL ENCOUNTER (OUTPATIENT)
Dept: BONE DENSITY | Facility: IMAGING CENTER | Age: 73
Discharge: HOME | End: 2018-12-18

## 2018-12-18 DIAGNOSIS — Z12.31 SCREENING MAMMOGRAM, ENCOUNTER FOR: ICD-10-CM

## 2018-12-18 DIAGNOSIS — N64.4 BREAST PAIN: Primary | ICD-10-CM

## 2018-12-19 ENCOUNTER — HOSPITAL ENCOUNTER (OUTPATIENT)
Dept: MAMMOGRAPHY | Facility: CLINIC | Age: 73
Discharge: HOME | End: 2018-12-19
Payer: MEDICARE

## 2018-12-19 ENCOUNTER — HOSPITAL ENCOUNTER (OUTPATIENT)
Dept: ULTRASOUND IMAGING | Facility: CLINIC | Age: 73
Discharge: HOME | End: 2018-12-19
Payer: MEDICARE

## 2018-12-19 VITALS — WEIGHT: 210 LBS | BODY MASS INDEX: 35.85 KG/M2 | HEIGHT: 64 IN

## 2018-12-19 DIAGNOSIS — N64.4 BREAST PAIN: ICD-10-CM

## 2018-12-19 DIAGNOSIS — F32.A DEPRESSION, UNSPECIFIED DEPRESSION TYPE: ICD-10-CM

## 2018-12-19 PROCEDURE — 77066 DX MAMMO INCL CAD BI: CPT

## 2018-12-19 PROCEDURE — 76642 ULTRASOUND BREAST LIMITED: CPT

## 2018-12-20 RX ORDER — CITALOPRAM 10 MG/1
TABLET ORAL
Qty: 90 TABLET | Refills: 0 | Status: SHIPPED | OUTPATIENT
Start: 2018-12-20 | End: 2019-02-27 | Stop reason: ALTCHOICE

## 2019-01-22 DIAGNOSIS — G89.4 CHRONIC PAIN SYNDROME: ICD-10-CM

## 2019-01-23 RX ORDER — IBUPROFEN 800 MG/1
TABLET ORAL
Qty: 270 TABLET | Refills: 0 | Status: SHIPPED | OUTPATIENT
Start: 2019-01-23 | End: 2019-04-22 | Stop reason: HOSPADM

## 2019-01-28 LAB
ALBUMIN SERPL-MCNC: 4.2 G/DL (ref 3.6–5.1)
ALBUMIN/GLOB SERPL: 1.8 (CALC) (ref 1–2.5)
ALP SERPL-CCNC: 76 U/L (ref 33–130)
ALT SERPL-CCNC: 13 U/L (ref 6–29)
AST SERPL-CCNC: 20 U/L (ref 10–35)
BILIRUB SERPL-MCNC: 0.4 MG/DL (ref 0.2–1.2)
BUN SERPL-MCNC: 32 MG/DL (ref 7–25)
BUN/CREAT SERPL: 26 (CALC) (ref 6–22)
CALCIUM SERPL-MCNC: 9.3 MG/DL (ref 8.6–10.4)
CHLORIDE SERPL-SCNC: 110 MMOL/L (ref 98–110)
CO2 SERPL-SCNC: 21 MMOL/L (ref 20–32)
CREAT SERPL-MCNC: 1.22 MG/DL (ref 0.6–0.93)
GLOBULIN SER CALC-MCNC: 2.3 G/DL (CALC) (ref 1.9–3.7)
GLUCOSE SERPL-MCNC: 89 MG/DL (ref 65–99)
POTASSIUM SERPL-SCNC: 4.5 MMOL/L (ref 3.5–5.3)
PROT SERPL-MCNC: 6.5 G/DL (ref 6.1–8.1)
SL AMB EGFR AFRICAN AMERICAN: 51 ML/MIN/1.73M2
SL AMB EGFR NON AFRICAN AMERICAN: 44 ML/MIN/1.73M2
SODIUM SERPL-SCNC: 141 MMOL/L (ref 135–146)

## 2019-01-30 DIAGNOSIS — I10 ESSENTIAL HYPERTENSION: ICD-10-CM

## 2019-01-30 RX ORDER — INDAPAMIDE 2.5 MG/1
2.5 TABLET, FILM COATED ORAL DAILY
Qty: 30 TABLET | Refills: 0 | Status: SHIPPED | OUTPATIENT
Start: 2019-01-30 | End: 2019-02-27 | Stop reason: SDUPTHER

## 2019-02-04 ENCOUNTER — TELEPHONE (OUTPATIENT)
Dept: FAMILY MEDICINE CLINIC | Facility: CLINIC | Age: 74
End: 2019-02-04

## 2019-02-04 NOTE — TELEPHONE ENCOUNTER
----- Message from Jose Luis Quiroz MD sent at 2/4/2019  8:17 AM EST -----  Call patient with lab result-kidney function slight improved, was MA doen- urine?

## 2019-02-27 ENCOUNTER — OFFICE VISIT (OUTPATIENT)
Dept: FAMILY MEDICINE CLINIC | Facility: CLINIC | Age: 74
End: 2019-02-27
Payer: MEDICARE

## 2019-02-27 VITALS
OXYGEN SATURATION: 99 % | WEIGHT: 212 LBS | BODY MASS INDEX: 36.19 KG/M2 | HEART RATE: 64 BPM | RESPIRATION RATE: 16 BRPM | DIASTOLIC BLOOD PRESSURE: 80 MMHG | HEIGHT: 64 IN | SYSTOLIC BLOOD PRESSURE: 138 MMHG

## 2019-02-27 DIAGNOSIS — K59.1 FUNCTIONAL DIARRHEA: ICD-10-CM

## 2019-02-27 DIAGNOSIS — M47.816 LUMBAR SPONDYLOSIS: ICD-10-CM

## 2019-02-27 DIAGNOSIS — I10 ESSENTIAL HYPERTENSION: Primary | ICD-10-CM

## 2019-02-27 DIAGNOSIS — Z00.00 MEDICARE ANNUAL WELLNESS VISIT, SUBSEQUENT: ICD-10-CM

## 2019-02-27 DIAGNOSIS — I10 ESSENTIAL (PRIMARY) HYPERTENSION: ICD-10-CM

## 2019-02-27 DIAGNOSIS — D50.8 ANEMIA, IRON DEFICIENCY, INADEQUATE DIETARY INTAKE: ICD-10-CM

## 2019-02-27 DIAGNOSIS — F32.A DEPRESSION, UNSPECIFIED DEPRESSION TYPE: ICD-10-CM

## 2019-02-27 DIAGNOSIS — R10.31 RIGHT LOWER QUADRANT ABDOMINAL PAIN: ICD-10-CM

## 2019-02-27 DIAGNOSIS — E66.9 OBESITY (BMI 35.0-39.9 WITHOUT COMORBIDITY): ICD-10-CM

## 2019-02-27 PROBLEM — R19.7 DIARRHEA: Status: ACTIVE | Noted: 2017-03-22

## 2019-02-27 PROBLEM — M79.605 LEG PAIN, BILATERAL: Status: ACTIVE | Noted: 2017-03-22

## 2019-02-27 PROBLEM — M79.604 LEG PAIN, BILATERAL: Status: ACTIVE | Noted: 2017-03-22

## 2019-02-27 PROCEDURE — G0439 PPPS, SUBSEQ VISIT: HCPCS | Performed by: FAMILY MEDICINE

## 2019-02-27 PROCEDURE — 99214 OFFICE O/P EST MOD 30 MIN: CPT | Performed by: FAMILY MEDICINE

## 2019-02-27 RX ORDER — CITALOPRAM 10 MG/1
10 TABLET ORAL DAILY
Qty: 90 TABLET | Refills: 0
Start: 2019-02-27 | End: 2019-03-20 | Stop reason: SDUPTHER

## 2019-02-27 RX ORDER — INDAPAMIDE 2.5 MG/1
2.5 TABLET, FILM COATED ORAL DAILY
Qty: 90 TABLET | Refills: 1 | Status: SHIPPED | OUTPATIENT
Start: 2019-02-27 | End: 2019-08-27

## 2019-02-27 NOTE — PROGRESS NOTES
Assessment and Plan:    Problem List Items Addressed This Visit     None        Health Maintenance Due   Topic Date Due    Medicare Annual Wellness Visit (AWV)  1945    SLP PLAN OF CARE  1945    BMI: Followup Plan  01/02/1964    Fall Risk  01/02/2011    Urinary Incontinence Screening  01/02/2011         HPI:  Severo Abate is a 69 y o  female here for her Subsequent Wellness Visit  Patient Active Problem List   Diagnosis    Primary localized osteoarthritis of right knee    Primary localized osteoarthritis of left knee    Lumbar spondylosis     Past Medical History:   Diagnosis Date    Hyperlipidemia     Hypertension     Primary localized osteoarthritis of right knee 2/6/2018    Psychiatric disorder      No past surgical history on file  Family History   Problem Relation Age of Onset    No Known Problems Mother     No Known Problems Father     Alcohol abuse Neg Hx     Substance Abuse Neg Hx      Social History     Tobacco Use   Smoking Status Never Smoker   Smokeless Tobacco Never Used     Social History     Substance and Sexual Activity   Alcohol Use No      Social History     Substance and Sexual Activity   Drug Use No       Current Outpatient Medications   Medication Sig Dispense Refill    citalopram (CeleXA) 10 mg tablet TAKE ONE TABLET ONCE DAILY 90 tablet 0    ibuprofen (MOTRIN) 800 mg tablet TAKE ONE TABLET THREE TIMES A DAY WITH FOOD 270 tablet 0    indapamide (LOZOL) 2 5 mg tablet Take 1 tablet (2 5 mg total) by mouth daily 30 tablet 0    lisinopril (ZESTRIL) 20 mg tablet TAKE ONE TABLET BY MOUTH EVERY DAY AS DIRECTED 90 tablet 1    LORazepam (ATIVAN) 0 5 mg tablet Take by mouth      sodium hyaluronate (ORTHOVISC) 30 mg/2 mL SOSY injection Inject into the joint       No current facility-administered medications for this visit        Allergies   Allergen Reactions    Meperidine Nausea Only     Other reaction(s): Nausea    Hydrocodone Rash     Immunization History Administered Date(s) Administered    Influenza Split High Dose Preservative Free IM 09/30/2015, 10/19/2016    Influenza TIV (IM) 09/16/2014, 09/14/2017    Influenza, high dose seasonal 0 5 mL 10/15/2018    Pneumococcal Conjugate 13-Valent 09/30/2015    Pneumococcal Polysaccharide PPV23 11/02/2017       Patient Care Team:  Oumar Littlejohn MD as PCP - General  Myah Flor MD    Medicare Screening Tests and Risk Assessments:  Judith Caldwell is here for her Subsequent Wellness visit  Last Medicare Wellness visit information reviewed, patient interviewed and updates made to the record today  Health Risk Assessment:  Patient rates overall health as fair  Patient feels that their physical health rating is Slightly worse  Eyesight was rated as Same  Hearing was rated as Same  Patient feels that their emotional and mental health rating is Same  Pain experienced by patient in the last 7 days has been Some  Patient's pain rating has been 5/10  Emotional/Mental Health:  Patient has been feeling nervous/anxious  PHQ-9 Depression Screening:    Frequency of the following problems over the past two weeks:      1  Little interest or pleasure in doing things: 0 - not at all      2  Feeling down, depressed, or hopeless: 0 - not at all  PHQ-2 Score: 0          Broken Bones/Falls: Fall Risk Assessment:    In the past year, patient has experienced: No history of falling in past year          Bladder/Bowel:  Patient has leaked urine accidently in the last six months  Patient reports loss of bowel control  Immunizations:  Patient has had a flu vaccination within the last year  Patient has received a pneumonia shot  Patient has not received a shingles shot  Patient has not received tetanus/diphtheria shot  Home Safety:  Patient has trouble with stairs inside or outside of their home     Patient currently reports that there are no safety hazards present in home, working smoke alarms, no working carbon monoxide detectors  Preventative Screenings:   No breast cancer screening performed, no colon cancer screen completed, cholesterol screen completed, glaucoma eye exam completed,     Nutrition:  Current diet: Regular with servings of the following:    Medications:  Patient is currently taking over-the-counter supplements  Patient is able to manage medications  Lifestyle Choices:  Patient reports no tobacco use  Patient has not smoked or used tobacco in the past   Patient reports no alcohol use  Patient drives a vehicle  Patient wears seat belt  Activities of Daily Living:  Can get out of bed by his or her self, able to dress self, able to make own meals, able to do own shopping, able to bathe self, can do own laundry/housekeeping, can manage own money, pay bills and track expenses    Previous Hospitalizations:  No hospitalization or ED visit in past 12 months        Advanced Directives:  Patient has decided on a power of   Patient has spoken to designated power of   Patient has completed advanced directive  Preventative Screening/Counseling:      Cardiovascular:      General: Screening Current          Diabetes:      General: Screening Current          Colorectal Cancer:      General: Screening Current          Breast Cancer:      General: Screening Current          Cervical Cancer:      General: Screening Not Indicated          Osteoporosis:      General: Risks and Benefits Discussed          AAA:      General: Screening Not Indicated          Glaucoma:      General: Screening Current          Hepatitis C:      General: Screening Current        Advanced Directives:   Patient has living will for healthcare, has durable POA for healthcare, patient has an advanced directive  Provider agrees with end of life decisions        Immunizations:      Influenza: Influenza UTD This Year and Influenza Recommended Annually      Pneumococcal: Lifetime Vaccine Completed      Shingrix: Risks & Benefits Discussed      TDAP: Risks & Benefits Discussed and Vaccine Status Unknown      Other Preventative Counseling (Non-Medicare):   Increase physical activity, Nutrition Counseling and Car/seat belt/driving safety reviewed

## 2019-02-27 NOTE — PROGRESS NOTES
Subjective:   Chief Complaint   Patient presents with    Follow-up     mm - review labs, med refill        Medicare Wellness Visit     annual wellness        Patient ID: Roberto Montalvo is a 69 y o  female  Patient comes in today for multiple medical issues  1) hypertension-good control current medication  2 chronic back pain-this is relatively stable  Does impact her gait  3) history of iron deficiency anemia  No recent monitoring  4) chronic loose bowel movement with some persistent right lower quadrant pain  No blood has been seen  5) depression-relatively stable with current Celexa dose  The following portions of the patient's history were reviewed and updated as appropriate: allergies, current medications, past family history, past medical history, past social history, past surgical history and problem list     Review of Systems   Constitutional: Negative for activity change, appetite change, diaphoresis, fatigue and fever  Respiratory: Negative for cough, chest tightness, shortness of breath and wheezing  Cardiovascular: Negative for chest pain, palpitations and leg swelling  Fast or slow heart rate   Gastrointestinal: Positive for diarrhea  Negative for abdominal pain, blood in stool, constipation, nausea and vomiting  Genitourinary: Negative for difficulty urinating, dysuria and frequency  Musculoskeletal: Positive for back pain and gait problem  Negative for arthralgias, joint swelling and myalgias  Neurological: Negative for dizziness, light-headedness and headaches  Psychiatric/Behavioral: Negative for agitation, confusion, decreased concentration, dysphoric mood and sleep disturbance  The patient is not nervous/anxious  Objective:  Vitals:    02/27/19 0831   BP: 138/80   Pulse: 64   Resp: 16   SpO2: 99%   Weight: 96 2 kg (212 lb)   Height: 5' 4" (1 626 m)      Physical Exam   Constitutional: She is oriented to person, place, and time   She appears well-developed and well-nourished  No distress  HENT:   Head: Normocephalic and atraumatic  Right Ear: Tympanic membrane, external ear and ear canal normal    Left Ear: Tympanic membrane, external ear and ear canal normal    Nose: No mucosal edema or rhinorrhea  Right sinus exhibits no maxillary sinus tenderness  Left sinus exhibits no maxillary sinus tenderness  Mouth/Throat: Uvula is midline  Mucous membranes are not pale and not dry  Normal dentition  No oropharyngeal exudate  Eyes: Pupils are equal, round, and reactive to light  EOM are normal  Right eye exhibits no discharge  Left eye exhibits no discharge  Neck: Normal range of motion  Neck supple  No thyromegaly present  Cardiovascular: Normal rate, regular rhythm, normal heart sounds and intact distal pulses  No murmur heard  Pulmonary/Chest: Effort normal and breath sounds normal  No respiratory distress  She has no wheezes  She has no rales  Abdominal: Soft  She exhibits no distension and no mass  There is tenderness  There is no rebound  Musculoskeletal: Normal range of motion  She exhibits no edema or tenderness  Lymphadenopathy:     She has no cervical adenopathy  Neurological: She is alert and oriented to person, place, and time  No cranial nerve deficit  Skin: She is not diaphoretic  Psychiatric: She has a normal mood and affect  Her behavior is normal          Assessment/Plan:    No problem-specific Assessment & Plan notes found for this encounter  Diagnoses and all orders for this visit:    Medicare annual wellness visit, subsequent    Functional diarrhea  -     CBC and differential; Future  -     Sedimentation rate, automated; Future  -     CBC and differential  -     Sedimentation rate, automated  -     Ambulatory referral to Gastroenterology; Future    Essential hypertension  -     TSH, 3rd generation; Future  -     indapamide (LOZOL) 2 5 mg tablet;  Take 1 tablet (2 5 mg total) by mouth daily  -     TSH, 3rd generation    Lumbar spondylosis    Anemia, iron deficiency, inadequate dietary intake  -     CBC and differential; Future  -     Ferritin; Future  -     CBC and differential  -     Ferritin    Primary osteoarthritis of both knees    Right lower quadrant abdominal pain  -     CBC and differential; Future  -     Sedimentation rate, automated; Future  -     CBC and differential  -     Sedimentation rate, automated  -     Ambulatory referral to Gastroenterology; Future    Essential (primary) hypertension   -     TSH, 3rd generation; Future  -     TSH, 3rd generation    Depression, unspecified depression type  -     citalopram (CeleXA) 10 mg tablet; Take 1 tablet (10 mg total) by mouth daily    Obesity (BMI 35 0-39 9 without comorbidity)        BMI Counseling: Body mass index is 36 39 kg/m²  Discussed the patient's BMI with her  The BMI is above average  BMI counseling and education was provided to the patient  Nutrition recommendations include reducing portion sizes, decreasing overall calorie intake, reducing fast food intake and moderation in carbohydrate intake

## 2019-03-07 ENCOUNTER — OFFICE VISIT (OUTPATIENT)
Dept: GASTROENTEROLOGY | Facility: CLINIC | Age: 74
End: 2019-03-07
Payer: MEDICARE

## 2019-03-07 VITALS
HEIGHT: 64 IN | HEART RATE: 58 BPM | SYSTOLIC BLOOD PRESSURE: 120 MMHG | DIASTOLIC BLOOD PRESSURE: 70 MMHG | BODY MASS INDEX: 36.7 KG/M2 | WEIGHT: 215 LBS

## 2019-03-07 DIAGNOSIS — N39.492 POSTURAL URINARY INCONTINENCE: ICD-10-CM

## 2019-03-07 DIAGNOSIS — K59.1 FUNCTIONAL DIARRHEA: ICD-10-CM

## 2019-03-07 DIAGNOSIS — R10.31 RIGHT LOWER QUADRANT ABDOMINAL PAIN: Primary | ICD-10-CM

## 2019-03-07 DIAGNOSIS — E65 PANNUS, ABDOMINAL: ICD-10-CM

## 2019-03-07 DIAGNOSIS — Z79.899 ENCOUNTER FOR LONG-TERM CURRENT USE OF MEDICATION: ICD-10-CM

## 2019-03-07 PROCEDURE — 99204 OFFICE O/P NEW MOD 45 MIN: CPT | Performed by: INTERNAL MEDICINE

## 2019-03-07 RX ORDER — PANTOPRAZOLE SODIUM 40 MG/1
40 TABLET, DELAYED RELEASE ORAL DAILY
Qty: 30 TABLET | Refills: 3 | Status: SHIPPED | OUTPATIENT
Start: 2019-03-07 | End: 2019-08-20 | Stop reason: SDUPTHER

## 2019-03-07 NOTE — PATIENT INSTRUCTIONS
[facility] - Outpatient Consultation  Dae Chiu 69 y o  female MRN: [id number]  Encounter: [id number]    ASSESSMENT AND PLAN:      1  Right lower quadrant abdominal pain  --patient with longstanding discomfort in the right lower quadrant  We actually saw the patient for these symptoms in 2014  At that time she had a CT scan of the abdomen and pelvis which was negative except for sutures in the right lower quadrant  On physical the patient seems to have some pain over her abdominal pannus and I think the pain is more anterior than related to internal organ i e  Patient may have neuropathic pain or pain related to fat necrosis in the pannus  The patient does have some pain more medial however stool need to re-evaluate the internal organs with CT scan  -differential diagnosis could include neuropathic pain, pain from fat necrosis in the pannus, less likely colonic disease    - CT abdomen pelvis w contrast; Future  -Colonoscopy at [facility]  -GI workup is negative may need to see Neurology or pain management  2  Functional diarrhea  --patient reports about 5 muddy type stools a day  She did have a colonoscopy 5 years ago and there was no major pathology noted    3  Postural urinary incontinence    Patient does have symptoms of urinary incontinence with change in position    4  Pannus, abdominal  Patient has a hardened area right lower quadrant over the past   There is a possibility that this is some source of her pain    5  Encounter for long-term current use of medication    Patient has been recently placed on ibuprofen 800 mg 3 times a day  Will give her Protonix so she does not developed peptic related disease  She has been on this medication over 6 years  -the patient does show evidence of anemia will plan on doing an EGD as well as a colonoscopy    - pantoprazole (PROTONIX) 40 mg tablet;  Take 1 tablet (40 mg total) by mouth daily for 30 days Dispense: 30 tablet;  Refill: 3

## 2019-03-07 NOTE — PROGRESS NOTES
[email or web address] - Outpatient Consultation  López Alvarez 69 y o  female MRN: [id number]  Encounter: [id number]    ASSESSMENT AND PLAN:      1  Right lower quadrant abdominal pain  --patient with longstanding discomfort in the right lower quadrant  We actually saw the patient for these symptoms in 2014  At that time she had a CT scan of the abdomen and pelvis which was negative except for sutures in the right lower quadrant  On physical the patient seems to have some pain over her abdominal pannus and I think the pain is more anterior than related to internal organ i e  Patient may have neuropathic pain or pain related to fat necrosis in the pannus  The patient does have some pain more medial however stool need to re-evaluate the internal organs with CT scan  -differential diagnosis could include neuropathic pain, pain from fat necrosis in the pannus, less likely colonic disease or other internal organs    - CT abdomen pelvis w contrast; Future  -Colonoscopy at [facility]     2  Functional diarrhea  --patient reports about 5 muddy type stools a day  She did have a colonoscopy 5 years ago and there was no major pathology noted  --prep with some limitations on last evaluation as it was described as  " fair "    3  Postural urinary incontinence    Patient does have symptoms of urinary incontinence with change in position-ongoing problem    4  Pannus, abdominal  Patient has a hardened area right lower quadrant over the past   There is a possibility that this is some source of her pain    5  Encounter for long-term current use of medication    Patient has been recently placed on ibuprofen 800 mg 3 times a day  Will give her Protonix so she does not developed peptic related disease  She has been on this medication over 6 years  -no anemia on cbc on 3/7 /2019    - pantoprazole (PROTONIX) 40 mg tablet;  Take 1 tablet (40 mg total) by mouth daily for 30 days  Dispense: 30 tablet; Refill: 3      Followup Appointment[de-identified] 2-3 months  ______________________________________________________________________    Chief Complaint   Patient presents with    RLQ pain, unformed stools, has to have bowel movements quick       HPI:   Jorge Saravia is a 69y o  year old female who presents longstanding right-sided abdominal pain  The pain is in the very right lower quadrant  It is quite positional   Patient does report she has a lot of difficulty with sleeping  She has large pannus formations in the lower abdomen on both the right and the left side  She states that when she sleeps she has to move to her right side and swing the left pannus over in order to get no comfortable position she states she cannot lay flat  She reports that the pain is there constantly  She states it has been there for over the last couple of years  She does report that she has had some spinal problems  The pain does not seem to be related to eating or having bowel movements  The patient does complain of frequent loose stools  She reports that they are quite like mashed potatoes in their consistency  It should be noted that the patient had very similar complaints in 2013 and underwent both CT scan of the abdomen and pelvis along with colonoscopy  Both of these investigations were unrevealing for the patient's source of pain  Colonoscopy showed hemorrhoids and some left-sided diverticulosis mild  The patient has been taking ibuprofen for the pain 3 times a day at fairly high dosing  Note the patient does report having had some falls but does not feel that this is cause the pain      Historical Information   Past Medical History:   Diagnosis Date    Asymptomatic gallstones     Chronic pain     Depression 10/22/2015    Hyperlipidemia     Hypertension     Primary localized osteoarthritis of right knee 2/6/2018    Psychiatric disorder     Spinal stenosis      Past Surgical History:   Procedure Laterality Date    APPENDECTOMY      TOE SURGERY       Social History     Substance and Sexual Activity   Alcohol Use No     Social History     Substance and Sexual Activity   Drug Use No     Social History     Tobacco Use   Smoking Status Never Smoker   Smokeless Tobacco Never Used     Family History   Problem Relation Age of Onset    Breast cancer additional onset Mother     No Known Problems Father     Alcohol abuse Neg Hx     Substance Abuse Neg Hx     Colon cancer Neg Hx     Colon polyps Neg Hx        Meds/Allergies     Current Outpatient Medications:     citalopram (CeleXA) 10 mg tablet    ibuprofen (MOTRIN) 800 mg tablet    indapamide (LOZOL) 2 5 mg tablet    lisinopril (ZESTRIL) 20 mg tablet    Na Sulfate-K Sulfate-Mg Sulf (SUPREP BOWEL PREP KIT) 17 5-3 13-1 6 GM/177ML SOLN    pantoprazole (PROTONIX) 40 mg tablet    Allergies   Allergen Reactions    Meperidine Nausea Only     Other reaction(s): Nausea    Hydrocodone Rash       PHYSICAL EXAM:    Blood pressure 120/70, pulse 58, height 5' 4" (1 626 m), weight 97 5 kg (215 lb), not currently breastfeeding  Body mass index is 36 9 kg/m²  General Appearance: NAD, cooperative, alert obese  HEENT:  Normocephalic, atraumatic, anicteric  Neck:  Supple, symmetrical, trachea midline  Lungs:  Clear to auscultation bilaterally; no rales, rhonchi or wheezing; respirations unlabored   Heart[de-identified]  Regular rate and rhythm; no murmur, rub, or gallop  Abdomen:    right lower quadrant tenderness  There is occasional guarding but this is when I palpate over the pannus especially on the right side  There is a 4 cm area of hard tender tissue within the pannus-there is some tenderness in the right lower quadrant medial to the pannus however   -the left pannus is soft  No other masses are noted    Rectal:  Deferred   Extremities: No cyanosis, clubbing or edema   Skin:  No jaundice, rashes, or lesions   Lymph nodes: No palpable cervical lymphadenopathy  Psych: Normal affect, good eye contact  Neuro: No gross deficits, AAOx3--significant for very poor ambulation patient needs assistance of 2 canes to get around    Lab Results:   3/8--normal CBC, sed rate 14  1/2019-CMP normal creatinine slightly high at 1 2        Radiology Results:   No results found  REVIEW OF SYSTEMS:    CONSTITUTIONAL: Denies any fever, chills, rigors, and weight loss  HEENT: No earache or tinnitus  Denies hearing loss or visual disturbances  CARDIOVASCULAR: No chest pain POSITIVE FOR IRREGULAR HEARTBEAT AND PALPITATION  RESPIRATORY: Denies any cough, hemoptysis POSITIVE FOR SHORTNESS OF BREATH WITH EXERTION  GASTROINTESTINAL: As noted in the History of Present Illness  POSITIVE FOR HEARTBURN  GENITOURINARY:  POSITIVE FOR INCONTINENCE  NEUROLOGIC POSITIVE FOR SOME INVOLUNTARY MOVEMENTS POSITIVE FOR AMBULATORY DYSFUNCTION POSITIVE FOR SOME NUMBNESS  MUSCULOSKELETAL POSITIVE FOR PAINFUL JOINTS SWOLLEN JOINTS HIS LEG CRAMPS JOINT STIFFNESS MUSCLE ACHES AND CHRONIC PAIN   SKIN: Denies skin rashes or itching  ENDOCRINE: Denies excessive thirst  Denies intolerance to heat or cold  PSYCHOSOCIAL: Denies depression or anxiety  Denies any recent memory loss

## 2019-03-07 NOTE — LETTER
March 9, 2019     Missael Larios MD  [address] [address]    Patient: Graciela Power   YOB: 1945   Date of Visit: 3/7/2019       Dear Dr Pradhan Cassette:    Thank you for referring Bailee Guzmán to me for evaluation  Below are my notes for this consultation  If you have questions, please do not hesitate to call me  I look forward to following your patient along with you  Sincerely,        Frieda Aparicio MD        CC: No Recipients  Frieda Aparicio MD  3/9/2019 10:43 AM  Incomplete    Shireen Styles [facility] - Outpatient Consultation  Graciela Power 69 y o  female MRN: [id number]  Encounter: [id number]    ASSESSMENT AND PLAN:      1  Right lower quadrant abdominal pain  --patient with longstanding discomfort in the right lower quadrant  We actually saw the patient for these symptoms in 2014  At that time she had a CT scan of the abdomen and pelvis which was negative except for sutures in the right lower quadrant  On physical the patient seems to have some pain over her abdominal pannus and I think the pain is more anterior than related to internal organ i e  Patient may have neuropathic pain or pain related to fat necrosis in the pannus  The patient does have some pain more medial however stool need to re-evaluate the internal organs with CT scan  -differential diagnosis could include neuropathic pain, pain from fat necrosis in the pannus, less likely colonic disease or other internal organs    - CT abdomen pelvis w contrast; Future  -Colonoscopy at [facility]     2  Functional diarrhea  --patient reports about 5 muddy type stools a day  She did have a colonoscopy 5 years ago and there was no major pathology noted  --prep with some limitations on last evaluation as it was described as  " fair "    3  Postural urinary incontinence    Patient does have symptoms of urinary incontinence with change in position-ongoing problem    4  Pannus, abdominal  Patient has a hardened area right lower quadrant over the past   There is a possibility that this is some source of her pain    5  Encounter for long-term current use of medication    Patient has been recently placed on ibuprofen 800 mg 3 times a day  Will give her Protonix so she does not developed peptic related disease  She has been on this medication over 6 years  -no anemia on cbc on 3/7 /2019    - pantoprazole (PROTONIX) 40 mg tablet; Take 1 tablet (40 mg total) by mouth daily for 30 days  Dispense: 30 tablet; Refill: 3      Followup Appointment[de-identified] 2-3 months  ______________________________________________________________________    Chief Complaint   Patient presents with    RLQ pain, unformed stools, has to have bowel movements quick       HPI:   Abigail Pierce is a 69y o  year old female who presents longstanding right-sided abdominal pain  The pain is in the very right lower quadrant  It is quite positional   Patient does report she has a lot of difficulty with sleeping  She has large pannus formations in the lower abdomen on both the right and the left side  She states that when she sleeps she has to move to her right side and swing the left pannus over in order to get no comfortable position she states she cannot lay flat  She reports that the pain is there constantly  She states it has been there for over the last couple of years  She does report that she has had some spinal problems  The pain does not seem to be related to eating or having bowel movements  The patient does complain of frequent loose stools  She reports that they are quite like mashed potatoes in their consistency  It should be noted that the patient had very similar complaints in 2013 and underwent both CT scan of the abdomen and pelvis along with colonoscopy  Both of these investigations were unrevealing for the patient's source of pain    Colonoscopy showed hemorrhoids and some left-sided diverticulosis mild  The patient has been taking ibuprofen for the pain 3 times a day at fairly high dosing  Note the patient does report having had some falls but does not feel that this is cause the pain  Historical Information   Past Medical History:   Diagnosis Date    Asymptomatic gallstones     Chronic pain     Depression 10/22/2015    Hyperlipidemia     Hypertension     Primary localized osteoarthritis of right knee 2/6/2018    Psychiatric disorder     Spinal stenosis      Past Surgical History:   Procedure Laterality Date    APPENDECTOMY      TOE SURGERY       Social History     Substance and Sexual Activity   Alcohol Use No     Social History     Substance and Sexual Activity   Drug Use No     Social History     Tobacco Use   Smoking Status Never Smoker   Smokeless Tobacco Never Used     Family History   Problem Relation Age of Onset    Breast cancer additional onset Mother     No Known Problems Father     Alcohol abuse Neg Hx     Substance Abuse Neg Hx     Colon cancer Neg Hx     Colon polyps Neg Hx        Meds/Allergies     Current Outpatient Medications:     citalopram (CeleXA) 10 mg tablet    ibuprofen (MOTRIN) 800 mg tablet    indapamide (LOZOL) 2 5 mg tablet    lisinopril (ZESTRIL) 20 mg tablet    Na Sulfate-K Sulfate-Mg Sulf (SUPREP BOWEL PREP KIT) 17 5-3 13-1 6 GM/177ML SOLN    pantoprazole (PROTONIX) 40 mg tablet    Allergies   Allergen Reactions    Meperidine Nausea Only     Other reaction(s): Nausea    Hydrocodone Rash       PHYSICAL EXAM:    Blood pressure 120/70, pulse 58, height 5' 4" (1 626 m), weight 97 5 kg (215 lb), not currently breastfeeding  Body mass index is 36 9 kg/m²  General Appearance: NAD, cooperative, alert obese  HEENT:  Normocephalic, atraumatic, anicteric      Neck:  Supple, symmetrical, trachea midline  Lungs:  Clear to auscultation bilaterally; no rales, rhonchi or wheezing; respirations unlabored   Heart[de-identified]  Regular rate and rhythm; no murmur, rub, or gallop  Abdomen:     right lower quadrant tenderness  There is occasional guarding but this is when I palpate over the pannus especially on the right side  There is a 4 cm area of hard tender tissue within the pannus-there is some tenderness in the right lower quadrant medial to the pannus however    -the left pannus is soft  No other masses are noted  Rectal:  Deferred   Extremities: No cyanosis, clubbing or edema   Skin:  No jaundice, rashes, or lesions   Lymph nodes: No palpable cervical lymphadenopathy  Psych: Normal affect, good eye contact  Neuro: No gross deficits, AAOx3--significant for very poor ambulation patient needs assistance of 2 canes to get around    Lab Results:   3/8--normal CBC, sed rate 14  1/2019-CMP normal creatinine slightly high at 1 2        Radiology Results:   No results found  REVIEW OF SYSTEMS:    CONSTITUTIONAL: Denies any fever, chills, rigors, and weight loss  HEENT: No earache or tinnitus  Denies hearing loss or visual disturbances  CARDIOVASCULAR: No chest pain POSITIVE FOR IRREGULAR HEARTBEAT AND PALPITATION  RESPIRATORY: Denies any cough, hemoptysis POSITIVE FOR SHORTNESS OF BREATH WITH EXERTION  GASTROINTESTINAL: As noted in the History of Present Illness  POSITIVE FOR HEARTBURN  GENITOURINARY:  POSITIVE FOR INCONTINENCE  NEUROLOGIC POSITIVE FOR SOME INVOLUNTARY MOVEMENTS POSITIVE FOR AMBULATORY DYSFUNCTION POSITIVE FOR SOME NUMBNESS  MUSCULOSKELETAL POSITIVE FOR PAINFUL JOINTS SWOLLEN JOINTS HIS LEG CRAMPS JOINT STIFFNESS MUSCLE ACHES AND CHRONIC PAIN   SKIN: Denies skin rashes or itching  ENDOCRINE: Denies excessive thirst  Denies intolerance to heat or cold  PSYCHOSOCIAL: Denies depression or anxiety  Denies any recent memory loss       Ann Marie Burr MD  3/9/2019 10:43 AM  Sign at close encounter    [facility] - Outpatient Consultation  Roberto Montalvo 69 y o  female MRN: [id number]  Encounter: [id number]    ASSESSMENT AND PLAN:      1  Right lower quadrant abdominal pain  --patient with longstanding discomfort in the right lower quadrant  We actually saw the patient for these symptoms in 2014  At that time she had a CT scan of the abdomen and pelvis which was negative except for sutures in the right lower quadrant  On physical the patient seems to have some pain over her abdominal pannus and I think the pain is more anterior than related to internal organ i e  Patient may have neuropathic pain or pain related to fat necrosis in the pannus  The patient does have some pain more medial however stool need to re-evaluate the internal organs with CT scan  -differential diagnosis could include neuropathic pain, pain from fat necrosis in the pannus, less likely colonic disease or other internal organs    - CT abdomen pelvis w contrast; Future  -Colonoscopy at [facility]     2  Functional diarrhea  --patient reports about 5 muddy type stools a day  She did have a colonoscopy 5 years ago and there was no major pathology noted  --prep with some limitations on last evaluation as it was described as  " fair "    3  Postural urinary incontinence    Patient does have symptoms of urinary incontinence with change in position-ongoing problem    4  Pannus, abdominal  Patient has a hardened area right lower quadrant over the past   There is a possibility that this is some source of her pain    5  Encounter for long-term current use of medication    Patient has been recently placed on ibuprofen 800 mg 3 times a day  Will give her Protonix so she does not developed peptic related disease  She has been on this medication over 6 years  -no anemia on cbc on 3/7 /2019    - pantoprazole (PROTONIX) 40 mg tablet; Take 1 tablet (40 mg total) by mouth daily for 30 days  Dispense: 30 tablet;  Refill: 3      Followup Appointment[de-identified] 2-3 months  ______________________________________________________________________    Chief Complaint   Patient presents with    RLQ pain, unformed stools, has to have bowel movements quick       HPI:   Cole Nieto is a 69y o  year old female who presents longstanding right-sided abdominal pain  The pain is in the very right lower quadrant  It is quite positional   Patient does report she has a lot of difficulty with sleeping  She has large pannus formations in the lower abdomen on both the right and the left side  She states that when she sleeps she has to move to her right side and swing the left pannus over in order to get no comfortable position she states she cannot lay flat  She reports that the pain is there constantly  She states it has been there for over the last couple of years  She does report that she has had some spinal problems  The pain does not seem to be related to eating or having bowel movements  The patient does complain of frequent loose stools  She reports that they are quite like mashed potatoes in their consistency  It should be noted that the patient had very similar complaints in 2013 and underwent both CT scan of the abdomen and pelvis along with colonoscopy  Both of these investigations were unrevealing for the patient's source of pain  Colonoscopy showed hemorrhoids and some left-sided diverticulosis mild  The patient has been taking ibuprofen for the pain 3 times a day at fairly high dosing  Note the patient does report having had some falls but does not feel that this is cause the pain      Historical Information   Past Medical History:   Diagnosis Date    Asymptomatic gallstones     Chronic pain     Depression 10/22/2015    Hyperlipidemia     Hypertension     Primary localized osteoarthritis of right knee 2/6/2018    Psychiatric disorder     Spinal stenosis      Past Surgical History:   Procedure Laterality Date    APPENDECTOMY      TOE SURGERY Social History     Substance and Sexual Activity   Alcohol Use No     Social History     Substance and Sexual Activity   Drug Use No     Social History     Tobacco Use   Smoking Status Never Smoker   Smokeless Tobacco Never Used     Family History   Problem Relation Age of Onset    Breast cancer additional onset Mother     No Known Problems Father     Alcohol abuse Neg Hx     Substance Abuse Neg Hx     Colon cancer Neg Hx     Colon polyps Neg Hx        Meds/Allergies     Current Outpatient Medications:     citalopram (CeleXA) 10 mg tablet    ibuprofen (MOTRIN) 800 mg tablet    indapamide (LOZOL) 2 5 mg tablet    lisinopril (ZESTRIL) 20 mg tablet    Na Sulfate-K Sulfate-Mg Sulf (SUPREP BOWEL PREP KIT) 17 5-3 13-1 6 GM/177ML SOLN    pantoprazole (PROTONIX) 40 mg tablet    Allergies   Allergen Reactions    Meperidine Nausea Only     Other reaction(s): Nausea    Hydrocodone Rash       PHYSICAL EXAM:    Blood pressure 120/70, pulse 58, height 5' 4" (1 626 m), weight 97 5 kg (215 lb), not currently breastfeeding  Body mass index is 36 9 kg/m²  General Appearance: NAD, cooperative, alert obese  HEENT:  Normocephalic, atraumatic, anicteric  Neck:  Supple, symmetrical, trachea midline  Lungs:  Clear to auscultation bilaterally; no rales, rhonchi or wheezing; respirations unlabored   Heart[de-identified]  Regular rate and rhythm; no murmur, rub, or gallop  Abdomen:     right lower quadrant tenderness  There is occasional guarding but this is when I palpate over the pannus especially on the right side  There is a 4 cm area of hard tender tissue within the pannus-there is some tenderness in the right lower quadrant medial to the pannus however    -the left pannus is soft  No other masses are noted    Rectal:  Deferred   Extremities: No cyanosis, clubbing or edema   Skin:  No jaundice, rashes, or lesions   Lymph nodes: No palpable cervical lymphadenopathy  Psych: Normal affect, good eye contact  Neuro: No gross deficits, AAOx3--significant for very poor ambulation patient needs assistance of 2 canes to get around    Lab Results:   3/8--normal CBC, sed rate 14  1/2019-CMP normal creatinine slightly high at 1 2        Radiology Results:   No results found  REVIEW OF SYSTEMS:    CONSTITUTIONAL: Denies any fever, chills, rigors, and weight loss  HEENT: No earache or tinnitus  Denies hearing loss or visual disturbances  CARDIOVASCULAR: No chest pain POSITIVE FOR IRREGULAR HEARTBEAT AND PALPITATION  RESPIRATORY: Denies any cough, hemoptysis POSITIVE FOR SHORTNESS OF BREATH WITH EXERTION  GASTROINTESTINAL: As noted in the History of Present Illness  POSITIVE FOR HEARTBURN  GENITOURINARY:  POSITIVE FOR INCONTINENCE  NEUROLOGIC POSITIVE FOR SOME INVOLUNTARY MOVEMENTS POSITIVE FOR AMBULATORY DYSFUNCTION POSITIVE FOR SOME NUMBNESS  MUSCULOSKELETAL POSITIVE FOR PAINFUL JOINTS SWOLLEN JOINTS HIS LEG CRAMPS JOINT STIFFNESS MUSCLE ACHES AND CHRONIC PAIN   SKIN: Denies skin rashes or itching  ENDOCRINE: Denies excessive thirst  Denies intolerance to heat or cold  PSYCHOSOCIAL: Denies depression or anxiety  Denies any recent memory loss

## 2019-03-08 ENCOUNTER — TELEPHONE (OUTPATIENT)
Dept: GASTROENTEROLOGY | Facility: CLINIC | Age: 74
End: 2019-03-08

## 2019-03-08 DIAGNOSIS — R10.9 ABDOMINAL PAIN, UNSPECIFIED ABDOMINAL LOCATION: Primary | ICD-10-CM

## 2019-03-08 LAB
BASOPHILS # BLD AUTO: 62 CELLS/UL (ref 0–200)
BASOPHILS NFR BLD AUTO: 1.1 %
EOSINOPHIL # BLD AUTO: 129 CELLS/UL (ref 15–500)
EOSINOPHIL NFR BLD AUTO: 2.3 %
ERYTHROCYTE [DISTWIDTH] IN BLOOD BY AUTOMATED COUNT: 13 % (ref 11–15)
ERYTHROCYTE [SEDIMENTATION RATE] IN BLOOD BY WESTERGREN METHOD: 14 MM/H
FERRITIN SERPL-MCNC: 101 NG/ML (ref 20–288)
HCT VFR BLD AUTO: 36.4 % (ref 35–45)
HGB BLD-MCNC: 12.2 G/DL (ref 11.7–15.5)
LYMPHOCYTES # BLD AUTO: 1238 CELLS/UL (ref 850–3900)
LYMPHOCYTES NFR BLD AUTO: 22.1 %
MCH RBC QN AUTO: 31.9 PG (ref 27–33)
MCHC RBC AUTO-ENTMCNC: 33.5 G/DL (ref 32–36)
MCV RBC AUTO: 95 FL (ref 80–100)
MONOCYTES # BLD AUTO: 370 CELLS/UL (ref 200–950)
MONOCYTES NFR BLD AUTO: 6.6 %
NEUTROPHILS # BLD AUTO: 3802 CELLS/UL (ref 1500–7800)
NEUTROPHILS NFR BLD AUTO: 67.9 %
PLATELET # BLD AUTO: 196 THOUSAND/UL (ref 140–400)
PMV BLD REES-ECKER: 11.5 FL (ref 7.5–12.5)
RBC # BLD AUTO: 3.83 MILLION/UL (ref 3.8–5.1)
TSH SERPL-ACNC: 1.87 MIU/L (ref 0.4–4.5)
WBC # BLD AUTO: 5.6 THOUSAND/UL (ref 3.8–10.8)

## 2019-03-08 NOTE — TELEPHONE ENCOUNTER
I returned call to Tawny Thorne at [address] [address] after discussion with Dr Carina Alejandra  He states he wants CT with contrast and to follow protocol  Emily's instructions for GFR of 44 is to hydrate prior to test either drink a couple of glasses of water in morning or go to infusion center for IV fluids  I informed her that patient will be instructed to drink fluids  The CT department will also give fluids pre test and after  I spoke with patient and conveyed the instructions

## 2019-03-08 NOTE — TELEPHONE ENCOUNTER
Call transf'd from [facility]  Caller states Pt's granddaughter is there to  barium for CT abd & pelvis Mon at 11A; indicates Rt LQ pain w/ possible appendicitis; questions why is appendicitis they are waiting 4 days? [address] daughter indicates Pt had appendicitis out  Nurse rev'd OV note - advised caller that appendicitis not noted for testing on OV note  Caller then questioned that last GFT was only 44  DOS 1/28  Advised will Ck and CB   CB# [phone]

## 2019-03-08 NOTE — TELEPHONE ENCOUNTER
Note      Call transf'd from [facility] [address] states Pt's granddaughter is there to  barium for CT abd & pelvis Mon at 11A; indicates Rt LQ pain w/ possible appendicitis; questions why is appendicitis they are waiting 4 days? [address] daughter indicates Pt had appendicitis out  Nurse rev'd OV note - advised caller that appendicitis not noted for testing on OV note  Caller then questioned that last GFT was only 44  DOS 1/28  Advised will Ck and CB   CB# [phone]

## 2019-03-11 ENCOUNTER — HOSPITAL ENCOUNTER (OUTPATIENT)
Dept: CT IMAGING | Facility: HOSPITAL | Age: 74
Discharge: HOME | End: 2019-03-11
Attending: INTERNAL MEDICINE
Payer: MEDICARE

## 2019-03-11 ENCOUNTER — TELEPHONE (OUTPATIENT)
Dept: FAMILY MEDICINE CLINIC | Facility: CLINIC | Age: 74
End: 2019-03-11

## 2019-03-11 ENCOUNTER — PREP FOR PROCEDURE (OUTPATIENT)
Dept: GASTROENTEROLOGY | Facility: CLINIC | Age: 74
End: 2019-03-11

## 2019-03-11 DIAGNOSIS — R10.31 RIGHT LOWER QUADRANT ABDOMINAL PAIN: ICD-10-CM

## 2019-03-11 DIAGNOSIS — R10.31 RIGHT LOWER QUADRANT ABDOMINAL PAIN: Primary | ICD-10-CM

## 2019-03-11 DIAGNOSIS — R93.89 ABNORMAL CT SCAN: ICD-10-CM

## 2019-03-11 PROCEDURE — 74177 CT ABD & PELVIS W/CONTRAST: CPT

## 2019-03-11 RX ADMIN — IODIXANOL 85 ML: 320 INJECTION, SOLUTION INTRAVASCULAR at 11:11

## 2019-03-11 NOTE — TELEPHONE ENCOUNTER
----- Message from Stefany Poole MD sent at 3/9/2019 10:46 AM EST -----  Call patient with lab result-these labs are normal no anemia, iron levels good
AMA (saw a physician/midlevel provider and clinician was able to provide reasons for staying for treatment & form is signed)

## 2019-03-11 NOTE — PRE-PROCEDURE INSTRUCTIONS
Pre-Surgery Instructions:   Medication Instructions    citalopram (CeleXA) 10 mg tablet Instructed patient per Anesthesia Guidelines   ibuprofen (MOTRIN) 800 mg tablet Instructed patient per Anesthesia Guidelines   indapamide (LOZOL) 2 5 mg tablet Instructed patient per Anesthesia Guidelines   lisinopril (ZESTRIL) 20 mg tablet Instructed patient per Anesthesia Guidelines   pantoprazole (PROTONIX) 40 mg tablet Instructed patient per Anesthesia Guidelines  Standard GI pre-procedure instructions given without any further questions or concerns at this time

## 2019-03-13 ENCOUNTER — HOSPITAL ENCOUNTER (OUTPATIENT)
Facility: HOSPITAL | Age: 74
Setting detail: OUTPATIENT SURGERY
Discharge: HOME | End: 2019-03-13
Attending: INTERNAL MEDICINE | Admitting: INTERNAL MEDICINE
Payer: MEDICARE

## 2019-03-13 ENCOUNTER — ANESTHESIA EVENT (OUTPATIENT)
Dept: PERIOP | Facility: HOSPITAL | Age: 74
End: 2019-03-13
Payer: MEDICARE

## 2019-03-13 ENCOUNTER — ANESTHESIA (OUTPATIENT)
Dept: PERIOP | Facility: HOSPITAL | Age: 74
End: 2019-03-13
Payer: MEDICARE

## 2019-03-13 VITALS
HEIGHT: 64 IN | BODY MASS INDEX: 36.7 KG/M2 | OXYGEN SATURATION: 100 % | RESPIRATION RATE: 20 BRPM | SYSTOLIC BLOOD PRESSURE: 176 MMHG | WEIGHT: 215 LBS | HEART RATE: 50 BPM | DIASTOLIC BLOOD PRESSURE: 65 MMHG

## 2019-03-13 DIAGNOSIS — R10.31 RIGHT LOWER QUADRANT ABDOMINAL PAIN: ICD-10-CM

## 2019-03-13 PROCEDURE — 88305 TISSUE EXAM BY PATHOLOGIST: CPT | Performed by: PATHOLOGY

## 2019-03-13 PROCEDURE — 45380 COLONOSCOPY AND BIOPSY: CPT | Performed by: INTERNAL MEDICINE

## 2019-03-13 RX ORDER — SODIUM CHLORIDE 9 MG/ML
20 INJECTION, SOLUTION INTRAVENOUS CONTINUOUS
Status: DISCONTINUED | OUTPATIENT
Start: 2019-03-13 | End: 2019-03-13 | Stop reason: HOSPADM

## 2019-03-13 RX ORDER — PROPOFOL 10 MG/ML
INJECTION, EMULSION INTRAVENOUS AS NEEDED
Status: DISCONTINUED | OUTPATIENT
Start: 2019-03-13 | End: 2019-03-13 | Stop reason: SURG

## 2019-03-13 RX ADMIN — SODIUM CHLORIDE 20 ML/HR: 0.9 INJECTION, SOLUTION INTRAVENOUS at 08:49

## 2019-03-13 RX ADMIN — PROPOFOL 100 MG: 10 INJECTION, EMULSION INTRAVENOUS at 09:37

## 2019-03-13 RX ADMIN — PROPOFOL 50 MG: 10 INJECTION, EMULSION INTRAVENOUS at 09:48

## 2019-03-13 RX ADMIN — PROPOFOL 50 MG: 10 INJECTION, EMULSION INTRAVENOUS at 09:43

## 2019-03-13 RX ADMIN — PROPOFOL 50 MG: 10 INJECTION, EMULSION INTRAVENOUS at 09:53

## 2019-03-13 NOTE — DISCHARGE INSTR - AVS FIRST PAGE
Colonoscopy Procedure Note    Procedure: Colonoscopy    Sedation: Monitored anesthesia care, check anesthesia records    III      ASA Class: III     INDICATIONS:  Right lower quadrant abdominal pain, change in bowel habits    POST-OP DIAGNOSIS: See the impression below    Procedure Details     Prior colonoscopy: 5 years ago  Informed consent was obtained for the procedure, including sedation  Risks of perforation, hemorrhage, adverse drug reaction and aspiration were discussed  The patient was placed in the left lateral decubitus position  Based on the pre-procedure assessment, including review of the patient's medical history, medications, allergies, and review of systems, she had been deemed to be an appropriate candidate for conscious sedation; she was therefore sedated with the medications listed below  The patient was monitored continuously with telemetry, pulse oximetry, blood pressure monitoring, and direct observations  A rectal examination was performed  The variable-stiffness pediatric colonoscope was inserted into the rectum and advanced under direct vision to the terminal ileum  The quality of the colonic preparation was good  A careful inspection was made as the colonoscope was withdrawn, including a retroflexed view of the rectum; findings and interventions are described below  Findings:  Mild diverticulosis throughout the colon greatest in sigmoid  Small internal hemorrhoids  Normal terminal ileum  Normal colonic mucosa throughout, random biopsy obtained to rule out microscopic colitis  No evidence of mass or inflammatory changes to explain patient's symptoms       Estimated Blood Loss: None    Specimens: Pathology        Complications: None; patient tolerated the procedure well  Impression:    Essentially normal rectum colon and terminal ileum  No mass or inflammatory lesions seen    Incidental finding of diverticulosis and internal hemorrhoids    Recommendations:  Repeat colonoscopy not recommended  If repeat colonoscopy is not being recommended, this is because of age (age = 77 or greater)  Resume usual medications  High-fiber diet, consider adding fiber supplementation  Proceed with CT scan of the abdomen and pelvis as planned  Followup [facility] 1-2 months as per Dr Kobe May, follow-up with PCP in pain management as needed with respect to possible neuropathic pain        Jeff Easley Patient,    If specimens were collected, the office will call you with the pathology results within 2 weeks  It is very important that you follow these instructions:    Because you received intravenous sedation for your exam, you must return home and   · REST TODAY - DO NOT STAY ALONE  · DO NOT operate machinery for 24 hours   · DO NOT drink alcohol for 24 hours  · DO NOT drive for 24 hours  · DO NOT return to work until tomorrow   · DO NOT sign important paperwork for 24 hours    ·  If the site where your IV was placed is painful, place warm wet  compresses on the site until the soreness is relieved  Call us if there  is no improvement within 24 hours  · Unless otherwise instructed, you may resume your regular diet,  Start by taking  crackers and clear liquids  If no nausea or vomiting, you may advance your diet as tolerated  · Call your physician at [phone] if you develop any of the following symptoms:  · NEW OR INCREASED BLEEDING  · NEW ABDOMINAL DISTENTION (SWELLING)  · INCREASING NAUSEA, VOMITING OR PAIN  · FEVER/CHILLS  · BLACK/BLOODY STOOLS    If you are unable to contact the the doctor or your primary physician and you are having a severe complication, go to the nearest emergency room or call 911  Call [facility] at [phone] if you have any problems or questions related to your procedure    NOTE: After normal office hours (Monday-Friday 9:00 a m  to 4:00 p m ), the answering service will answer the phone, take a message, and they will contact the on call physician to call you

## 2019-03-13 NOTE — ANESTHESIA PREPROCEDURE EVALUATION
Review of Systems/Medical History          Cardiovascular  Hyperlipidemia, Hypertension ,    Pulmonary       GI/Hepatic            Endo/Other     GYN       Hematology  Anemia ,     Musculoskeletal    Comment: bmi 40 Arthritis     Neurology   Psychology   Anxiety, Depression ,              Physical Exam    Airway    Mallampati score: II         Dental   upper dentures,     Cardiovascular  Rhythm: regular, Rate: normal,     Pulmonary  Breath sounds clear to auscultation,     Other Findings        Anesthesia Plan  ASA Score- 3     Anesthesia Type- IV sedation with anesthesia with ASA Monitors  Additional Monitors:   Airway Plan:         Plan Factors-    Induction- intravenous  Postoperative Plan-     Informed Consent- Anesthetic plan and risks discussed with patient

## 2019-03-13 NOTE — OP NOTE
Colonoscopy Procedure Note    Procedure: Colonoscopy    Sedation: Monitored anesthesia care, check anesthesia records    III      ASA Class: III     INDICATIONS:  Right lower quadrant abdominal pain, change in bowel habits    POST-OP DIAGNOSIS: See the impression below    Procedure Details     Prior colonoscopy: 5 years ago  Informed consent was obtained for the procedure, including sedation  Risks of perforation, hemorrhage, adverse drug reaction and aspiration were discussed  The patient was placed in the left lateral decubitus position  Based on the pre-procedure assessment, including review of the patient's medical history, medications, allergies, and review of systems, she had been deemed to be an appropriate candidate for conscious sedation; she was therefore sedated with the medications listed below  The patient was monitored continuously with telemetry, pulse oximetry, blood pressure monitoring, and direct observations  A rectal examination was performed  The variable-stiffness pediatric colonoscope was inserted into the rectum and advanced under direct vision to the terminal ileum  The quality of the colonic preparation was good  A careful inspection was made as the colonoscope was withdrawn, including a retroflexed view of the rectum; findings and interventions are described below  Findings:  Mild diverticulosis throughout the colon greatest in sigmoid  Small internal hemorrhoids  Normal terminal ileum  Normal colonic mucosa throughout, random biopsy obtained to rule out microscopic colitis  No evidence of mass or inflammatory changes to explain patient's symptoms       Estimated Blood Loss: None    Specimens: Pathology        Complications: None; patient tolerated the procedure well  Impression:    Essentially normal rectum colon and terminal ileum  No mass or inflammatory lesions seen    Incidental finding of diverticulosis and internal hemorrhoids    Recommendations:  Repeat colonoscopy not recommended    If repeat colonoscopy is not being recommended, this is because of age (age = 77 or greater)  Resume usual medications  High-fiber diet, consider adding fiber supplementation  Proceed with CT scan of the abdomen and pelvis as planned  Followup [facility] 1-2 months as per Dr Kobe May, follow-up with PCP in pain management as needed with respect to possible neuropathic pain

## 2019-03-13 NOTE — ANESTHESIA POSTPROCEDURE EVALUATION
Post-Op Assessment Note    CV Status:  Stable  Pain Score: 0    Pain management: adequate     Mental Status:  Alert and awake   Hydration Status:  Euvolemic   PONV Controlled:  Controlled   Airway Patency:  Patent   Post Op Vitals Reviewed: Yes      Staff: Anesthesiologist           BP     Temp      Pulse     Resp      SpO2

## 2019-03-14 ENCOUNTER — TELEPHONE (OUTPATIENT)
Dept: GASTROENTEROLOGY | Facility: CLINIC | Age: 74
End: 2019-03-14

## 2019-03-14 NOTE — TELEPHONE ENCOUNTER
Patient called anxious for her CT results  She had CT A/P Tuesday & Colonoscopy yesterday, she is still in excruciating pain specifically her RT lower abd ( she has been experiencing this pain prior to colon)  It's hard for her to get out of bed  I advised we had not yet received the CT results  I will call [facility] to check status  Patient can be reached at [phone]

## 2019-03-14 NOTE — TELEPHONE ENCOUNTER
I called Vini Rosales at [place] [address] [facility], she will put in a request for CT to be read tomorrow  I notified patient  She appreciated the callback

## 2019-03-15 NOTE — TELEPHONE ENCOUNTER
I called the patient about the CT scan result  Note is made of gallstones also note is made of jejunal irritation proximal jejunum  The right lower quadrant does not show any evidence of inflammation or problems  The patient did have a negative colonoscopy and ileoscopy this week  I do not believe this pain is related to her intestinal tract  It should be noted that her diarrhea is better since she has been taking pantoprazole  Further evaluation of the pain I recommend the following   1  Consultation with Dr Faye--to evaluate the right-sided pannus and see if there is any surgical approach to that could be made this is may be causing some of the pain--patient has already called for an appointment  2  Consider pain management consult with Dr Mo Jacobs     With respect to the abnormality on CT scan in the jejunum, will plan an EGD with a pediatric colonoscope at [address]   We will be able to take biopsies in this area  Again this does not have anything to do with the patient's pain  Will arrange

## 2019-03-15 NOTE — TELEPHONE ENCOUNTER
Patient was seen by Dr Jovany Mott 3/7/19, EGD performed by Dr Kaden Menjivar 3/13/19 at [facility]  CT ordered by Dr Jovany Mott  It is now resulted IMPRESSION:     Possible gallstones  No surrounding inflammation  Further investigation could be performed with ultrasound      Nonspecific diffuse wall thickening involving the proximal jejunum  This is likely an enteritis and most commonly caused by infectious or inflammatory etiology    Please provide further recommendations as patient continues with abd pain   Thank you  She can be reached at [phone]

## 2019-03-18 ENCOUNTER — DOCUMENTATION (OUTPATIENT)
Dept: GASTROENTEROLOGY | Facility: CLINIC | Age: 74
End: 2019-03-18

## 2019-03-18 PROBLEM — R93.89 ABNORMAL CT SCAN: Status: ACTIVE | Noted: 2019-03-18

## 2019-03-18 NOTE — TELEPHONE ENCOUNTER
Called and scheduled patient for 3/27/19 Weds @ [facility]  Phone prep given to MA to complete  Patient questioned if she should still keep her 3/28 consult with Dr Dc Kamara, which I suggested she should as Dr Hui Hirsch does not believe pain is related to intestinal tract

## 2019-03-18 NOTE — PROGRESS NOTES
Phone prep for egd dx abd pain   meds reviewed and instructions mailed to michelle sifuentes    HT: 215  WT: 5 ft 2 in   BMI:36 90

## 2019-03-19 DIAGNOSIS — F32.A DEPRESSION, UNSPECIFIED DEPRESSION TYPE: ICD-10-CM

## 2019-03-19 RX ORDER — CITALOPRAM 10 MG/1
TABLET ORAL
Qty: 90 TABLET | Refills: 0 | OUTPATIENT
Start: 2019-03-19

## 2019-03-20 DIAGNOSIS — F32.A DEPRESSION, UNSPECIFIED DEPRESSION TYPE: ICD-10-CM

## 2019-03-20 RX ORDER — CITALOPRAM 10 MG/1
10 TABLET ORAL DAILY
Qty: 90 TABLET | Refills: 0 | Status: SHIPPED | OUTPATIENT
Start: 2019-03-20 | End: 2019-04-26 | Stop reason: SDUPTHER

## 2019-03-27 ENCOUNTER — ANESTHESIA EVENT (OUTPATIENT)
Dept: PERIOP | Facility: HOSPITAL | Age: 74
End: 2019-03-27
Payer: MEDICARE

## 2019-03-27 ENCOUNTER — HOSPITAL ENCOUNTER (OUTPATIENT)
Facility: HOSPITAL | Age: 74
Setting detail: OUTPATIENT SURGERY
Discharge: HOME | End: 2019-03-27
Attending: INTERNAL MEDICINE | Admitting: INTERNAL MEDICINE
Payer: MEDICARE

## 2019-03-27 ENCOUNTER — ANESTHESIA (OUTPATIENT)
Dept: PERIOP | Facility: HOSPITAL | Age: 74
End: 2019-03-27
Payer: MEDICARE

## 2019-03-27 VITALS
HEART RATE: 54 BPM | WEIGHT: 221.1 LBS | SYSTOLIC BLOOD PRESSURE: 148 MMHG | BODY MASS INDEX: 37.75 KG/M2 | DIASTOLIC BLOOD PRESSURE: 90 MMHG | OXYGEN SATURATION: 99 % | RESPIRATION RATE: 18 BRPM | HEIGHT: 64 IN | TEMPERATURE: 97.2 F

## 2019-03-27 DIAGNOSIS — R93.89 ABNORMAL CT SCAN: ICD-10-CM

## 2019-03-27 PROCEDURE — 87077 CULTURE AEROBIC IDENTIFY: CPT | Performed by: INTERNAL MEDICINE

## 2019-03-27 PROCEDURE — 43239 EGD BIOPSY SINGLE/MULTIPLE: CPT | Performed by: INTERNAL MEDICINE

## 2019-03-27 PROCEDURE — 88305 TISSUE EXAM BY PATHOLOGIST: CPT | Performed by: PATHOLOGY

## 2019-03-27 RX ORDER — SODIUM CHLORIDE 9 MG/ML
20 INJECTION, SOLUTION INTRAVENOUS CONTINUOUS
Status: DISCONTINUED | OUTPATIENT
Start: 2019-03-27 | End: 2019-03-27 | Stop reason: HOSPADM

## 2019-03-27 RX ORDER — PROPOFOL 10 MG/ML
INJECTION, EMULSION INTRAVENOUS AS NEEDED
Status: DISCONTINUED | OUTPATIENT
Start: 2019-03-27 | End: 2019-03-27 | Stop reason: SURG

## 2019-03-27 RX ADMIN — PROPOFOL 60 MG: 10 INJECTION, EMULSION INTRAVENOUS at 09:46

## 2019-03-27 RX ADMIN — PROPOFOL 100 MG: 10 INJECTION, EMULSION INTRAVENOUS at 09:31

## 2019-03-27 RX ADMIN — PROPOFOL 100 MG: 10 INJECTION, EMULSION INTRAVENOUS at 09:40

## 2019-03-27 RX ADMIN — SODIUM CHLORIDE: 0.9 INJECTION, SOLUTION INTRAVENOUS at 09:42

## 2019-03-27 NOTE — ANESTHESIA POSTPROCEDURE EVALUATION
Post-Op Assessment Note    CV Status:  Stable  Pain Score: 0    Pain management: adequate     Mental Status:  Alert and awake   Hydration Status:  Stable   PONV Controlled:  None   Airway Patency:  Patent   Post Op Vitals Reviewed: Yes      Staff: CRNA           BP      Temp      Pulse     Resp      SpO2

## 2019-03-27 NOTE — ANESTHESIA PREPROCEDURE EVALUATION
Review of Systems/Medical History          Cardiovascular  Hyperlipidemia, Hypertension ,    Pulmonary       GI/Hepatic            Endo/Other     GYN       Hematology  Anemia ,     Musculoskeletal    Comment: bmi 40 Arthritis     Neurology   Psychology   Anxiety, Depression ,              Physical Exam    Airway    Mallampati score: II         Dental   upper dentures,     Cardiovascular  Rhythm: regular, Rate: normal,     Pulmonary  Breath sounds clear to auscultation,     Other Findings        Anesthesia Plan  ASA Score- 3     Anesthesia Type- IV sedation with anesthesia with ASA Monitors  Additional Monitors:   Airway Plan:         Plan Factors-    Induction- intravenous  Postoperative Plan-     Informed Consent- Anesthetic plan and risks discussed with patient  I personally reviewed this patient with the CRNA  Discussed and agreed on the Anesthesia Plan with the CRNA  Priscila More

## 2019-03-28 ENCOUNTER — HOSPITAL ENCOUNTER (OUTPATIENT)
Dept: RADIOLOGY | Facility: HOSPITAL | Age: 74
Discharge: HOME | End: 2019-03-28
Attending: SURGERY
Payer: MEDICARE

## 2019-03-28 ENCOUNTER — CONSULT (OUTPATIENT)
Dept: SURGERY | Facility: HOSPITAL | Age: 74
End: 2019-03-28
Payer: MEDICARE

## 2019-03-28 VITALS
TEMPERATURE: 99.1 F | HEART RATE: 54 BPM | HEIGHT: 64 IN | DIASTOLIC BLOOD PRESSURE: 79 MMHG | SYSTOLIC BLOOD PRESSURE: 167 MMHG | WEIGHT: 221 LBS | BODY MASS INDEX: 37.73 KG/M2

## 2019-03-28 DIAGNOSIS — R10.31 SEVERE RIGHT GROIN PAIN: Primary | ICD-10-CM

## 2019-03-28 DIAGNOSIS — D18.01 HEMANGIOMA OF SKIN: ICD-10-CM

## 2019-03-28 DIAGNOSIS — L91.8 SKIN TAGS, MULTIPLE ACQUIRED: ICD-10-CM

## 2019-03-28 DIAGNOSIS — K80.20 CALCULUS OF GALLBLADDER WITHOUT CHOLECYSTITIS WITHOUT OBSTRUCTION: ICD-10-CM

## 2019-03-28 DIAGNOSIS — D22.9 MULTIPLE PIGMENTED NEVI: ICD-10-CM

## 2019-03-28 DIAGNOSIS — L21.9 SEBORRHEA: ICD-10-CM

## 2019-03-28 DIAGNOSIS — R10.31 SEVERE RIGHT GROIN PAIN: ICD-10-CM

## 2019-03-28 DIAGNOSIS — E66.01 MORBID OBESITY DUE TO EXCESS CALORIES (HCC): ICD-10-CM

## 2019-03-28 DIAGNOSIS — R10.11 RUQ PAIN: ICD-10-CM

## 2019-03-28 DIAGNOSIS — K58.0 IRRITABLE BOWEL SYNDROME WITH DIARRHEA: ICD-10-CM

## 2019-03-28 LAB — UREASE TISS QL: NEGATIVE

## 2019-03-28 PROCEDURE — 99205 OFFICE O/P NEW HI 60 MIN: CPT | Performed by: SURGERY

## 2019-03-28 PROCEDURE — 73502 X-RAY EXAM HIP UNI 2-3 VIEWS: CPT

## 2019-03-28 NOTE — PROGRESS NOTES
Assessment/Plan: Erica Dotson is a 69year old female who presents today regarding chronic RLQ and right groin pain  CT scan performed on 3/13/19 showed possible gallstones, but no surrounding inflammation  Physical exam revealed tenderness of the right groin with excoriations and a slight yeast infection  She is also tender in her right groin upon flexion of her right leg  No hernias were palpated on exam  Her pain does not appear to be intra-abdominal  Her pain may be from arthritis of her hip  Will obtain an X-ray to further evaluate  She knows to contact the office if any questions or concerns arise  RUQ pain- Physical exam revealed some tenderness of RUQ, possibly from her gallstones  She denies any fatty food intolerance  Explained the gallstones are likely not causing her right groin pain  Recommend that she keeps a log of what she eats and her pain to determine if her gallbladder is causing her pain  Bowel movements- Her irregular bowel movements may be related to IBS  Recommend she follows up with GI     Skin- Seborrhea and pigmented nevi of neck  Multiple pigmented nevi, skin tag, seborrhea, and hemangiomas of back  Recommend she has her PCP monitor these annually  Colonoscopy- Colonoscopy performed on 3/13/19 showed mild diverticulitis and internal hemorrhoids  No mass or inflammatory lesions were seen  Morbid obesity - weight loss diet excercise    No problem-specific Assessment & Plan notes found for this encounter  There are no diagnoses linked to this encounter  Subjective:      Patient ID: Gelacio Casillas is a 69 y o  female  Erica Dotson is a 69year old female who presents today regarding chronic RLQ and right groin pain  She had a colonoscopy and CT scan performed on 3/13/19 and recently had an endoscopy yesterday  She says the pain began within the last year and she initially thought the pain was from nerve issues   She reports she has always been overweight and has done heavy lifting for work for most of her life  She also reports feeling a lump in her RLQ that was hard  She was told that it was likely fat necrosis  She says her pain is constant and will often occur 35-40 minutes after she eats  She says that sometimes she has the pain when she does not eat and has not associated the pain with any specific foods  She says she ate 16 [employer]itz crackers this morning and had a lot of pain  She also notes that her bowel movements have been a "soft puddy" and has about 8 bowel movements a day  She says she has a pinched sciatic nerve, spinal stenosis, and herniated discs  She says she lives alone  She is obese with a BMI of 37 93  The following portions of the patient's history were reviewed and updated as appropriate: allergies, current medications, past family history, past medical history, past social history, past surgical history and problem list     Review of Systems      Objective:      Ht 5' 4" (1 626 m)   Wt 100 kg (221 lb)   BMI 37 93 kg/m²          Physical Exam   Constitutional: She is oriented to person, place, and time  She appears well-developed and well-nourished  No distress  Obese  HENT:   Head: Normocephalic and atraumatic  Right Ear: External ear normal    Left Ear: External ear normal    Nose: Nose normal    Mouth/Throat: Oropharynx is clear and moist  No oropharyngeal exudate  Eyes: Pupils are equal, round, and reactive to light  Conjunctivae and EOM are normal  Right eye exhibits no discharge  Left eye exhibits no discharge  No scleral icterus  Neck: Normal range of motion  Neck supple  No JVD present  No tracheal deviation present  No thyromegaly present  Cardiovascular: Normal rate, normal heart sounds and intact distal pulses  Exam reveals no gallop and no friction rub  No murmur heard  Pulmonary/Chest: Effort normal  No stridor  No respiratory distress  She has no wheezes  She has no rales  She exhibits no tenderness  Abdominal: Soft  Bowel sounds are normal  She exhibits no distension and no mass  There is tenderness (right groin and RUQ)  There is no rebound and no guarding  No hernia  Musculoskeletal: Normal range of motion  She exhibits tenderness (right groin upon right leg flexion)  She exhibits no edema or deformity  Lymphadenopathy:     She has no cervical adenopathy  Neurological: She is alert and oriented to person, place, and time  No cranial nerve deficit  Coordination normal    Skin: Skin is warm and dry  No rash noted  She is not diaphoretic  No erythema  No pallor  Seborrhea and pigmented nevi of neck  Multiple pigmented nevi, skin tag, seborrhea, and hemangiomas of back  Excoriations and slight yeast infection of right groin  Psychiatric: She has a normal mood and affect  Her behavior is normal  Judgment and thought content normal    Nursing note and vitals reviewed  By signing my name below, I, Laxmi Lees, attest that this documentation has been prepared under the direction and in the presence of Srikanth Chapman MD  Electronically Signed: Roland Salguero  3/28/19  [facility], personally performed the services described in this documentation  All medical record entries made by the scribe were at my direction and in my presence  I have reviewed the chart and discharge instructions and agree that the record reflects my personal performance and is accurate and complete  Srikanth Chapman MD  3/28/19

## 2019-04-02 ENCOUNTER — OFFICE VISIT (OUTPATIENT)
Dept: OBGYN CLINIC | Facility: CLINIC | Age: 74
End: 2019-04-02
Payer: MEDICARE

## 2019-04-02 VITALS
SYSTOLIC BLOOD PRESSURE: 149 MMHG | DIASTOLIC BLOOD PRESSURE: 70 MMHG | BODY MASS INDEX: 35.82 KG/M2 | WEIGHT: 215 LBS | HEIGHT: 65 IN

## 2019-04-02 DIAGNOSIS — M16.11 PRIMARY OSTEOARTHRITIS OF ONE HIP, RIGHT: Primary | ICD-10-CM

## 2019-04-02 PROCEDURE — 99215 OFFICE O/P EST HI 40 MIN: CPT | Performed by: ORTHOPAEDIC SURGERY

## 2019-04-02 RX ORDER — FERROUS SULFATE TAB EC 324 MG (65 MG FE EQUIVALENT) 324 (65 FE) MG
324 TABLET DELAYED RESPONSE ORAL
Qty: 60 TABLET | Refills: 2 | Status: SHIPPED | OUTPATIENT
Start: 2019-04-02 | End: 2020-02-19 | Stop reason: ALTCHOICE

## 2019-04-02 RX ORDER — CEFAZOLIN SODIUM 2 G/50ML
2000 SOLUTION INTRAVENOUS ONCE
Status: CANCELLED | OUTPATIENT
Start: 2019-04-02 | End: 2019-04-02

## 2019-04-02 RX ORDER — ASCORBIC ACID 500 MG
500 TABLET ORAL 2 TIMES DAILY
Qty: 60 TABLET | Refills: 2 | Status: SHIPPED | OUTPATIENT
Start: 2019-04-02 | End: 2020-02-19 | Stop reason: ALTCHOICE

## 2019-04-02 RX ORDER — FOLIC ACID 1 MG/1
1 TABLET ORAL DAILY
Qty: 30 TABLET | Refills: 2 | Status: SHIPPED | OUTPATIENT
Start: 2019-04-02 | End: 2020-02-19 | Stop reason: ALTCHOICE

## 2019-04-02 RX ORDER — SODIUM CHLORIDE, SODIUM LACTATE, POTASSIUM CHLORIDE, CALCIUM CHLORIDE 600; 310; 30; 20 MG/100ML; MG/100ML; MG/100ML; MG/100ML
50 INJECTION, SOLUTION INTRAVENOUS CONTINUOUS
Status: CANCELLED | OUTPATIENT
Start: 2019-04-02

## 2019-04-02 RX ORDER — CHLORHEXIDINE GLUCONATE 0.12 MG/ML
15 RINSE ORAL ONCE
Status: CANCELLED | OUTPATIENT
Start: 2019-04-02 | End: 2019-04-02

## 2019-04-03 ENCOUNTER — TELEPHONE (OUTPATIENT)
Dept: OBGYN CLINIC | Facility: CLINIC | Age: 74
End: 2019-04-03

## 2019-04-03 ENCOUNTER — TELEPHONE (OUTPATIENT)
Dept: FAMILY MEDICINE CLINIC | Facility: CLINIC | Age: 74
End: 2019-04-03

## 2019-04-04 ENCOUNTER — TELEPHONE (OUTPATIENT)
Dept: OBGYN CLINIC | Facility: CLINIC | Age: 74
End: 2019-04-04

## 2019-04-05 LAB
ABO GROUP BLD: NORMAL
ALBUMIN SERPL-MCNC: 3.9 G/DL (ref 3.6–5.1)
ALBUMIN/GLOB SERPL: 1.9 (CALC) (ref 1–2.5)
ALP SERPL-CCNC: 64 U/L (ref 33–130)
ALT SERPL-CCNC: 11 U/L (ref 6–29)
APPEARANCE UR: ABNORMAL
APTT PPP: 40 SEC (ref 22–34)
AST SERPL-CCNC: 17 U/L (ref 10–35)
BACTERIA UR QL AUTO: ABNORMAL /HPF
BASOPHILS # BLD AUTO: 40 CELLS/UL (ref 0–200)
BASOPHILS NFR BLD AUTO: 0.9 %
BILIRUB SERPL-MCNC: 0.5 MG/DL (ref 0.2–1.2)
BILIRUB UR QL STRIP: NEGATIVE
BLD GP AB SCN SERPL QL: NORMAL
BUN SERPL-MCNC: 23 MG/DL (ref 7–25)
BUN/CREAT SERPL: 17 (CALC) (ref 6–22)
CALCIUM SERPL-MCNC: 8.9 MG/DL (ref 8.6–10.4)
CHLORIDE SERPL-SCNC: 109 MMOL/L (ref 98–110)
CO2 SERPL-SCNC: 21 MMOL/L (ref 20–32)
COLOR UR: YELLOW
CREAT SERPL-MCNC: 1.36 MG/DL (ref 0.6–0.93)
EOSINOPHIL # BLD AUTO: 101 CELLS/UL (ref 15–500)
EOSINOPHIL NFR BLD AUTO: 2.3 %
ERYTHROCYTE [DISTWIDTH] IN BLOOD BY AUTOMATED COUNT: 13 % (ref 11–15)
EST. AVERAGE GLUCOSE BLD GHB EST-MCNC: 100 (CALC)
EST. AVERAGE GLUCOSE BLD GHB EST-SCNC: 5.5 (CALC)
GLOBULIN SER CALC-MCNC: 2.1 G/DL (CALC) (ref 1.9–3.7)
GLUCOSE SERPL-MCNC: 89 MG/DL (ref 65–99)
GLUCOSE UR QL STRIP: NEGATIVE
HBA1C MFR BLD: 5.1 % OF TOTAL HGB
HCT VFR BLD AUTO: 32.9 % (ref 35–45)
HGB BLD-MCNC: 11.1 G/DL (ref 11.7–15.5)
HGB UR QL STRIP: NEGATIVE
HYALINE CASTS #/AREA URNS LPF: ABNORMAL /LPF
INR PPP: 1.1
KETONES UR QL STRIP: NEGATIVE
LEUKOCYTE ESTERASE UR QL STRIP: ABNORMAL
LYMPHOCYTES # BLD AUTO: 946 CELLS/UL (ref 850–3900)
LYMPHOCYTES NFR BLD AUTO: 21.5 %
MCH RBC QN AUTO: 32.2 PG (ref 27–33)
MCHC RBC AUTO-ENTMCNC: 33.7 G/DL (ref 32–36)
MCV RBC AUTO: 95.4 FL (ref 80–100)
MONOCYTES # BLD AUTO: 339 CELLS/UL (ref 200–950)
MONOCYTES NFR BLD AUTO: 7.7 %
NEUTROPHILS # BLD AUTO: 2974 CELLS/UL (ref 1500–7800)
NEUTROPHILS NFR BLD AUTO: 67.6 %
NITRITE UR QL STRIP: NEGATIVE
PH UR STRIP: 7 [PH] (ref 5–8)
PLATELET # BLD AUTO: 177 THOUSAND/UL (ref 140–400)
PMV BLD REES-ECKER: 10.6 FL (ref 7.5–12.5)
POTASSIUM SERPL-SCNC: 4.4 MMOL/L (ref 3.5–5.3)
PROT SERPL-MCNC: 6 G/DL (ref 6.1–8.1)
PROT UR QL STRIP: NEGATIVE
PROTHROMBIN TIME: 11.2 SEC (ref 9–11.5)
RBC # BLD AUTO: 3.45 MILLION/UL (ref 3.8–5.1)
RBC #/AREA URNS HPF: ABNORMAL /HPF
RH BLD: NORMAL
SL AMB EGFR AFRICAN AMERICAN: 45 ML/MIN/1.73M2
SL AMB EGFR NON AFRICAN AMERICAN: 39 ML/MIN/1.73M2
SODIUM SERPL-SCNC: 139 MMOL/L (ref 135–146)
SP GR UR STRIP: 1.01 (ref 1–1.03)
SQUAMOUS #/AREA URNS HPF: ABNORMAL /HPF
WBC # BLD AUTO: 4.4 THOUSAND/UL (ref 3.8–10.8)
WBC #/AREA URNS HPF: ABNORMAL /HPF

## 2019-04-09 ENCOUNTER — ANESTHESIA EVENT (OUTPATIENT)
Dept: PERIOP | Facility: HOSPITAL | Age: 74
End: 2019-04-09
Payer: MEDICARE

## 2019-04-09 ENCOUNTER — TELEPHONE (OUTPATIENT)
Dept: FAMILY MEDICINE CLINIC | Facility: HOSPITAL | Age: 74
End: 2019-04-09

## 2019-04-09 ENCOUNTER — OFFICE VISIT (OUTPATIENT)
Dept: FAMILY MEDICINE CLINIC | Facility: HOSPITAL | Age: 74
End: 2019-04-09
Payer: MEDICARE

## 2019-04-09 VITALS
WEIGHT: 215 LBS | HEART RATE: 55 BPM | HEIGHT: 65 IN | RESPIRATION RATE: 16 BRPM | DIASTOLIC BLOOD PRESSURE: 68 MMHG | SYSTOLIC BLOOD PRESSURE: 158 MMHG | BODY MASS INDEX: 35.82 KG/M2

## 2019-04-09 DIAGNOSIS — Z01.818 PRE-OP EVALUATION: Primary | ICD-10-CM

## 2019-04-09 DIAGNOSIS — N18.30 STAGE III CHRONIC KIDNEY DISEASE (HCC): ICD-10-CM

## 2019-04-09 DIAGNOSIS — M16.11 PRIMARY OSTEOARTHRITIS OF ONE HIP, RIGHT: ICD-10-CM

## 2019-04-09 DIAGNOSIS — R00.1 SINUS BRADYCARDIA: ICD-10-CM

## 2019-04-09 DIAGNOSIS — I10 ESSENTIAL HYPERTENSION: ICD-10-CM

## 2019-04-09 LAB — SINUS: NORMAL CM

## 2019-04-09 PROCEDURE — 99214 OFFICE O/P EST MOD 30 MIN: CPT | Performed by: INTERNAL MEDICINE

## 2019-04-09 PROCEDURE — 93000 ELECTROCARDIOGRAM COMPLETE: CPT | Performed by: INTERNAL MEDICINE

## 2019-04-12 ENCOUNTER — TELEPHONE (OUTPATIENT)
Dept: OBGYN CLINIC | Facility: HOSPITAL | Age: 74
End: 2019-04-12

## 2019-04-22 ENCOUNTER — ANESTHESIA (OUTPATIENT)
Dept: PERIOP | Facility: HOSPITAL | Age: 74
End: 2019-04-22
Payer: MEDICARE

## 2019-04-22 ENCOUNTER — HOSPITAL ENCOUNTER (OUTPATIENT)
Facility: HOSPITAL | Age: 74
Setting detail: SURGERY ADMIT
Discharge: HOME | End: 2019-04-22
Attending: ORTHOPAEDIC SURGERY | Admitting: ORTHOPAEDIC SURGERY
Payer: MEDICARE

## 2019-04-22 VITALS
SYSTOLIC BLOOD PRESSURE: 151 MMHG | DIASTOLIC BLOOD PRESSURE: 67 MMHG | HEIGHT: 65 IN | HEART RATE: 64 BPM | RESPIRATION RATE: 18 BRPM | BODY MASS INDEX: 34.16 KG/M2 | TEMPERATURE: 99.5 F | WEIGHT: 205 LBS | OXYGEN SATURATION: 100 %

## 2019-04-22 DIAGNOSIS — M16.11 PRIMARY OSTEOARTHRITIS OF ONE HIP, RIGHT: Primary | ICD-10-CM

## 2019-04-22 DIAGNOSIS — N18.30 STAGE III CHRONIC KIDNEY DISEASE (HCC): ICD-10-CM

## 2019-04-22 LAB
ABO GROUP BLD: NORMAL
APTT PPP: 58 SECONDS (ref 26–38)
BLD GP AB SCN SERPL QL: NEGATIVE
RH BLD: POSITIVE
SPECIMEN EXPIRATION DATE: NORMAL

## 2019-04-22 PROCEDURE — 86850 RBC ANTIBODY SCREEN: CPT | Performed by: ORTHOPAEDIC SURGERY

## 2019-04-22 PROCEDURE — 85730 THROMBOPLASTIN TIME PARTIAL: CPT | Performed by: ORTHOPAEDIC SURGERY

## 2019-04-22 PROCEDURE — 86901 BLOOD TYPING SEROLOGIC RH(D): CPT | Performed by: ORTHOPAEDIC SURGERY

## 2019-04-22 PROCEDURE — 86900 BLOOD TYPING SEROLOGIC ABO: CPT | Performed by: ORTHOPAEDIC SURGERY

## 2019-04-22 PROCEDURE — NC001 PR NO CHARGE: Performed by: ORTHOPAEDIC SURGERY

## 2019-04-22 RX ORDER — SODIUM CHLORIDE, SODIUM LACTATE, POTASSIUM CHLORIDE, CALCIUM CHLORIDE 600; 310; 30; 20 MG/100ML; MG/100ML; MG/100ML; MG/100ML
50 INJECTION, SOLUTION INTRAVENOUS CONTINUOUS
Status: DISCONTINUED | OUTPATIENT
Start: 2019-04-22 | End: 2019-04-22 | Stop reason: HOSPADM

## 2019-04-22 RX ORDER — FENTANYL CITRATE/PF 50 MCG/ML
25 SYRINGE (ML) INJECTION
Status: CANCELLED | OUTPATIENT
Start: 2019-04-22

## 2019-04-22 RX ORDER — HYDROMORPHONE HCL/PF 1 MG/ML
0.5 SYRINGE (ML) INJECTION
Status: CANCELLED | OUTPATIENT
Start: 2019-04-22

## 2019-04-22 RX ORDER — TRAMADOL HYDROCHLORIDE 50 MG/1
50 TABLET ORAL EVERY 6 HOURS PRN
Qty: 30 TABLET | Refills: 0 | Status: SHIPPED | OUTPATIENT
Start: 2019-04-22 | End: 2019-05-02

## 2019-04-22 RX ORDER — CEFAZOLIN SODIUM 2 G/50ML
2000 SOLUTION INTRAVENOUS ONCE
Status: DISCONTINUED | OUTPATIENT
Start: 2019-04-22 | End: 2019-04-22 | Stop reason: HOSPADM

## 2019-04-22 RX ORDER — CHLORHEXIDINE GLUCONATE 0.12 MG/ML
15 RINSE ORAL ONCE
Status: COMPLETED | OUTPATIENT
Start: 2019-04-22 | End: 2019-04-22

## 2019-04-22 RX ADMIN — SODIUM CHLORIDE, SODIUM LACTATE, POTASSIUM CHLORIDE, AND CALCIUM CHLORIDE 50 ML/HR: .6; .31; .03; .02 INJECTION, SOLUTION INTRAVENOUS at 09:20

## 2019-04-22 RX ADMIN — CHLORHEXIDINE GLUCONATE 0.12% ORAL RINSE 15 ML: 1.2 LIQUID ORAL at 09:10

## 2019-04-23 ENCOUNTER — TELEPHONE (OUTPATIENT)
Dept: OBGYN CLINIC | Facility: CLINIC | Age: 74
End: 2019-04-23

## 2019-04-25 ENCOUNTER — TELEPHONE (OUTPATIENT)
Dept: FAMILY MEDICINE CLINIC | Facility: CLINIC | Age: 74
End: 2019-04-25

## 2019-04-26 ENCOUNTER — OFFICE VISIT (OUTPATIENT)
Dept: FAMILY MEDICINE CLINIC | Facility: CLINIC | Age: 74
End: 2019-04-26
Payer: MEDICARE

## 2019-04-26 VITALS
SYSTOLIC BLOOD PRESSURE: 128 MMHG | HEART RATE: 65 BPM | DIASTOLIC BLOOD PRESSURE: 84 MMHG | OXYGEN SATURATION: 99 % | WEIGHT: 205 LBS | BODY MASS INDEX: 34.16 KG/M2 | HEIGHT: 65 IN

## 2019-04-26 DIAGNOSIS — I10 ESSENTIAL HYPERTENSION: ICD-10-CM

## 2019-04-26 DIAGNOSIS — R89.9 ABNORMAL LABORATORY TEST: ICD-10-CM

## 2019-04-26 DIAGNOSIS — R79.1 ABNORMAL COAGULATION PROFILE: Primary | ICD-10-CM

## 2019-04-26 DIAGNOSIS — M16.11 PRIMARY OSTEOARTHRITIS OF ONE HIP, RIGHT: ICD-10-CM

## 2019-04-26 DIAGNOSIS — M47.816 LUMBAR SPONDYLOSIS: ICD-10-CM

## 2019-04-26 DIAGNOSIS — F32.A DEPRESSION, UNSPECIFIED DEPRESSION TYPE: ICD-10-CM

## 2019-04-26 DIAGNOSIS — N18.30 STAGE III CHRONIC KIDNEY DISEASE (HCC): ICD-10-CM

## 2019-04-26 PROCEDURE — 99214 OFFICE O/P EST MOD 30 MIN: CPT | Performed by: FAMILY MEDICINE

## 2019-04-26 RX ORDER — LISINOPRIL 20 MG/1
20 TABLET ORAL DAILY
Qty: 90 TABLET | Refills: 1 | Status: SHIPPED | OUTPATIENT
Start: 2019-04-26 | End: 2019-08-20 | Stop reason: SDUPTHER

## 2019-04-26 RX ORDER — CITALOPRAM 10 MG/1
20 TABLET ORAL DAILY
Qty: 90 TABLET | Refills: 0 | Status: SHIPPED | OUTPATIENT
Start: 2019-04-26 | End: 2019-08-20 | Stop reason: SDUPTHER

## 2019-04-29 ENCOUNTER — TELEPHONE (OUTPATIENT)
Dept: FAMILY MEDICINE CLINIC | Facility: CLINIC | Age: 74
End: 2019-04-29

## 2019-04-29 ENCOUNTER — TELEPHONE (OUTPATIENT)
Dept: OBGYN CLINIC | Facility: CLINIC | Age: 74
End: 2019-04-29

## 2019-04-29 DIAGNOSIS — R79.1 ABNORMAL COAGULATION PROFILE: Primary | ICD-10-CM

## 2019-04-30 ENCOUNTER — TELEPHONE (OUTPATIENT)
Dept: OBGYN CLINIC | Facility: CLINIC | Age: 74
End: 2019-04-30

## 2019-04-30 LAB
APTT PPP: 45 SEC (ref 22–34)
INR PPP: 1.1
PROTHROMBIN TIME: 11.2 SEC (ref 9–11.5)

## 2019-05-04 ENCOUNTER — TELEPHONE (OUTPATIENT)
Dept: OBGYN CLINIC | Facility: HOSPITAL | Age: 74
End: 2019-05-04

## 2019-05-06 ENCOUNTER — HOSPITAL ENCOUNTER (EMERGENCY)
Facility: HOSPITAL | Age: 74
Discharge: HOME | End: 2019-05-06
Attending: EMERGENCY MEDICINE | Admitting: EMERGENCY MEDICINE
Payer: MEDICARE

## 2019-05-06 VITALS
TEMPERATURE: 99 F | HEART RATE: 61 BPM | HEIGHT: 65 IN | BODY MASS INDEX: 34.16 KG/M2 | RESPIRATION RATE: 16 BRPM | SYSTOLIC BLOOD PRESSURE: 159 MMHG | DIASTOLIC BLOOD PRESSURE: 72 MMHG | OXYGEN SATURATION: 99 % | WEIGHT: 205 LBS

## 2019-05-06 DIAGNOSIS — R19.7 DIARRHEA: Primary | ICD-10-CM

## 2019-05-06 DIAGNOSIS — D68.9 COAGULOPATHY (HCC): ICD-10-CM

## 2019-05-06 DIAGNOSIS — M25.551 RIGHT HIP PAIN: ICD-10-CM

## 2019-05-06 LAB
ALBUMIN SERPL BCP-MCNC: 3.5 G/DL (ref 3.5–5)
ALP SERPL-CCNC: 84 U/L (ref 46–116)
ALT SERPL W P-5'-P-CCNC: 26 U/L (ref 12–78)
ANION GAP SERPL CALCULATED.3IONS-SCNC: 13 MMOL/L (ref 4–13)
APTT PPP: 57 SECONDS (ref 26–38)
AST SERPL W P-5'-P-CCNC: 28 U/L (ref 5–45)
ATRIAL RATE: 63 BPM
BASOPHILS # BLD AUTO: 0.04 THOUSANDS/ΜL (ref 0–0.1)
BASOPHILS NFR BLD AUTO: 1 % (ref 0–1)
BILIRUB SERPL-MCNC: 0.5 MG/DL (ref 0.2–1)
BUN SERPL-MCNC: 18 MG/DL (ref 5–25)
CALCIUM SERPL-MCNC: 9.5 MG/DL (ref 8.3–10.1)
CHLORIDE SERPL-SCNC: 104 MMOL/L (ref 100–108)
CO2 SERPL-SCNC: 22 MMOL/L (ref 21–32)
CREAT SERPL-MCNC: 1.15 MG/DL (ref 0.6–1.3)
EOSINOPHIL # BLD AUTO: 0.09 THOUSAND/ΜL (ref 0–0.61)
EOSINOPHIL NFR BLD AUTO: 2 % (ref 0–6)
ERYTHROCYTE [DISTWIDTH] IN BLOOD BY AUTOMATED COUNT: 12.5 % (ref 11.6–15.1)
GFR SERPL CREATININE-BSD FRML MDRD: 47 ML/MIN/1.73SQ M
GLUCOSE SERPL-MCNC: 97 MG/DL (ref 65–140)
HCT VFR BLD AUTO: 37 % (ref 34.8–46.1)
HGB BLD-MCNC: 12.2 G/DL (ref 11.5–15.4)
IMM GRANULOCYTES # BLD AUTO: 0.02 THOUSAND/UL (ref 0–0.2)
IMM GRANULOCYTES NFR BLD AUTO: 0 % (ref 0–2)
INR PPP: 1.24 (ref 0.86–1.17)
LYMPHOCYTES # BLD AUTO: 0.98 THOUSANDS/ΜL (ref 0.6–4.47)
LYMPHOCYTES NFR BLD AUTO: 18 % (ref 14–44)
MCH RBC QN AUTO: 31.7 PG (ref 26.8–34.3)
MCHC RBC AUTO-ENTMCNC: 33 G/DL (ref 31.4–37.4)
MCV RBC AUTO: 96 FL (ref 82–98)
MONOCYTES # BLD AUTO: 0.33 THOUSAND/ΜL (ref 0.17–1.22)
MONOCYTES NFR BLD AUTO: 6 % (ref 4–12)
NEUTROPHILS # BLD AUTO: 4.11 THOUSANDS/ΜL (ref 1.85–7.62)
NEUTS SEG NFR BLD AUTO: 73 % (ref 43–75)
NRBC BLD AUTO-RTO: 0 /100 WBCS
P AXIS: 22 DEGREES
PLATELET # BLD AUTO: 250 THOUSANDS/UL (ref 149–390)
PMV BLD AUTO: 10.8 FL (ref 8.9–12.7)
POTASSIUM SERPL-SCNC: 5.4 MMOL/L (ref 3.5–5.3)
PR INTERVAL: 148 MS
PROT SERPL-MCNC: 7 G/DL (ref 6.4–8.2)
PROTHROMBIN TIME: 14.9 SECONDS (ref 11.8–14.2)
QRS AXIS: 39 DEGREES
QRSD INTERVAL: 64 MS
QT INTERVAL: 386 MS
QTC INTERVAL: 395 MS
RBC # BLD AUTO: 3.85 MILLION/UL (ref 3.81–5.12)
SODIUM SERPL-SCNC: 139 MMOL/L (ref 136–145)
T WAVE AXIS: 59 DEGREES
VENTRICULAR RATE: 63 BPM
WBC # BLD AUTO: 5.57 THOUSAND/UL (ref 4.31–10.16)

## 2019-05-06 PROCEDURE — 80053 COMPREHEN METABOLIC PANEL: CPT | Performed by: EMERGENCY MEDICINE

## 2019-05-06 PROCEDURE — 96360 HYDRATION IV INFUSION INIT: CPT

## 2019-05-06 PROCEDURE — 85610 PROTHROMBIN TIME: CPT | Performed by: EMERGENCY MEDICINE

## 2019-05-06 PROCEDURE — 85730 THROMBOPLASTIN TIME PARTIAL: CPT | Performed by: EMERGENCY MEDICINE

## 2019-05-06 PROCEDURE — 99284 EMERGENCY DEPT VISIT MOD MDM: CPT | Performed by: EMERGENCY MEDICINE

## 2019-05-06 PROCEDURE — 99284 EMERGENCY DEPT VISIT MOD MDM: CPT

## 2019-05-06 PROCEDURE — 96361 HYDRATE IV INFUSION ADD-ON: CPT

## 2019-05-06 PROCEDURE — 93005 ELECTROCARDIOGRAM TRACING: CPT

## 2019-05-06 PROCEDURE — 93010 ELECTROCARDIOGRAM REPORT: CPT | Performed by: INTERNAL MEDICINE

## 2019-05-06 PROCEDURE — 85025 COMPLETE CBC W/AUTO DIFF WBC: CPT | Performed by: EMERGENCY MEDICINE

## 2019-05-06 PROCEDURE — 36415 COLL VENOUS BLD VENIPUNCTURE: CPT | Performed by: EMERGENCY MEDICINE

## 2019-05-06 RX ORDER — ONDANSETRON 2 MG/ML
4 INJECTION INTRAMUSCULAR; INTRAVENOUS ONCE
Status: DISCONTINUED | OUTPATIENT
Start: 2019-05-06 | End: 2019-05-06 | Stop reason: HOSPADM

## 2019-05-06 RX ORDER — TRAMADOL HYDROCHLORIDE 50 MG/1
50 TABLET ORAL ONCE
Status: COMPLETED | OUTPATIENT
Start: 2019-05-06 | End: 2019-05-06

## 2019-05-06 RX ORDER — TRAMADOL HYDROCHLORIDE 50 MG/1
50 TABLET ORAL EVERY 6 HOURS PRN
Qty: 30 TABLET | Refills: 0 | Status: SHIPPED | OUTPATIENT
Start: 2019-05-06 | End: 2019-05-16

## 2019-05-06 RX ADMIN — SODIUM CHLORIDE 1000 ML: 0.9 INJECTION, SOLUTION INTRAVENOUS at 09:31

## 2019-05-06 RX ADMIN — TRAMADOL HYDROCHLORIDE 50 MG: 50 TABLET, COATED ORAL at 08:51

## 2019-05-08 ENCOUNTER — VBI (OUTPATIENT)
Dept: ADMINISTRATIVE | Facility: OTHER | Age: 74
End: 2019-05-08

## 2019-05-10 ENCOUNTER — CONSULT (OUTPATIENT)
Dept: HEMATOLOGY ONCOLOGY | Facility: CLINIC | Age: 74
End: 2019-05-10
Payer: MEDICARE

## 2019-05-10 VITALS
HEART RATE: 64 BPM | WEIGHT: 199.4 LBS | BODY MASS INDEX: 33.22 KG/M2 | HEIGHT: 65 IN | TEMPERATURE: 98.8 F | OXYGEN SATURATION: 98 % | RESPIRATION RATE: 16 BRPM | DIASTOLIC BLOOD PRESSURE: 70 MMHG | SYSTOLIC BLOOD PRESSURE: 130 MMHG

## 2019-05-10 DIAGNOSIS — R79.1 PROLONGED PTT (PARTIAL THROMBOPLASTIN TIME): Primary | ICD-10-CM

## 2019-05-10 DIAGNOSIS — R79.1 ABNORMAL COAGULATION PROFILE: ICD-10-CM

## 2019-05-10 PROCEDURE — 99204 OFFICE O/P NEW MOD 45 MIN: CPT | Performed by: INTERNAL MEDICINE

## 2019-05-24 LAB
ALBUMIN SERPL ELPH-MCNC: 4.2 G/DL (ref 3.8–4.8)
ALBUMIN SERPL-MCNC: 4.1 G/DL (ref 3.6–5.1)
ALBUMIN/GLOB SERPL: 1.5 (CALC) (ref 1–2.5)
ALP SERPL-CCNC: 85 U/L (ref 33–130)
ALPHA1 GLOB SERPL ELPH-MCNC: 0.3 G/DL (ref 0.2–0.3)
ALPHA2 GLOB SERPL ELPH-MCNC: 0.7 G/DL (ref 0.5–0.9)
ALT SERPL-CCNC: 13 U/L (ref 6–29)
APTT 1H NP PPP: ABNORMAL S
APTT IMM NP PPP: ABNORMAL S
AST SERPL-CCNC: 18 U/L (ref 10–35)
B2 GLYCOPROT1 IGA SER-ACNC: <9 SAU
B2 GLYCOPROT1 IGG SER-ACNC: <9 SGU
B2 GLYCOPROT1 IGM SER-ACNC: <9 SMU
BASOPHILS # BLD AUTO: 39 CELLS/UL (ref 0–200)
BASOPHILS NFR BLD AUTO: 0.7 %
BETA1 GLOB SERPL ELPH-MCNC: 0.4 G/DL (ref 0.4–0.6)
BETA2 GLOB SERPL ELPH-MCNC: 0.3 G/DL (ref 0.2–0.5)
BILIRUB SERPL-MCNC: 0.4 MG/DL (ref 0.2–1.2)
BUN SERPL-MCNC: 34 MG/DL (ref 7–25)
BUN/CREAT SERPL: 25 (CALC) (ref 6–22)
CALCIUM SERPL-MCNC: 9.5 MG/DL (ref 8.6–10.4)
CARDIOLIPIN IGA SER IA-ACNC: <11 APL
CARDIOLIPIN IGG SER IA-ACNC: <14 GPL
CARDIOLIPIN IGM SER IA-ACNC: <12 MPL
CHLORIDE SERPL-SCNC: 105 MMOL/L (ref 98–110)
CO2 SERPL-SCNC: 22 MMOL/L (ref 20–32)
CONFIRM APTT STACLOT: POSITIVE
CREAT SERPL-MCNC: 1.37 MG/DL (ref 0.6–0.93)
EOSINOPHIL # BLD AUTO: 112 CELLS/UL (ref 15–500)
EOSINOPHIL NFR BLD AUTO: 2 %
ERYTHROCYTE [DISTWIDTH] IN BLOOD BY AUTOMATED COUNT: 12.6 % (ref 11–15)
EXTRINS CLOTTING PATH PPP-IMP: ABNORMAL
GAMMA GLOB SERPL ELPH-MCNC: 1 G/DL (ref 0.8–1.7)
GLOBULIN SER CALC-MCNC: 2.7 G/DL (CALC) (ref 1.9–3.7)
GLUCOSE SERPL-MCNC: 92 MG/DL (ref 65–99)
HCT VFR BLD AUTO: 35.3 % (ref 35–45)
HGB BLD-MCNC: 11.9 G/DL (ref 11.7–15.5)
IGA SERPL-MCNC: 246 MG/DL (ref 81–463)
IGG SERPL-MCNC: 656 MG/DL (ref 694–1618)
IGM SERPL-MCNC: 96 MG/DL (ref 48–271)
INTERPRETATION SERPL IFE-IMP: NORMAL
KAPPA LC FREE SER-MCNC: 30.1 MG/L (ref 3.3–19.4)
KAPPA LC FREE/LAMBDA FREE SER: 1.02 {RATIO} (ref 0.26–1.65)
LA PPP-IMP: ABNORMAL
LAMBDA LC FREE SERPL-MCNC: 29.4 MG/L (ref 5.7–26.3)
LYMPHOCYTES # BLD AUTO: 1014 CELLS/UL (ref 850–3900)
LYMPHOCYTES NFR BLD AUTO: 18.1 %
MCH RBC QN AUTO: 31.7 PG (ref 27–33)
MCHC RBC AUTO-ENTMCNC: 33.7 G/DL (ref 32–36)
MCV RBC AUTO: 94.1 FL (ref 80–100)
MIXING STUDIES PPP-IMP: ABNORMAL
MONOCYTES # BLD AUTO: 370 CELLS/UL (ref 200–950)
MONOCYTES NFR BLD AUTO: 6.6 %
NEUTROPHILS # BLD AUTO: 4066 CELLS/UL (ref 1500–7800)
NEUTROPHILS NFR BLD AUTO: 72.6 %
PLATELET # BLD AUTO: 211 THOUSAND/UL (ref 140–400)
PMV BLD REES-ECKER: 11.5 FL (ref 7.5–12.5)
POTASSIUM SERPL-SCNC: 4.3 MMOL/L (ref 3.5–5.3)
PROT PATTERN SERPL ELPH-IMP: NORMAL
PROT SERPL-MCNC: 6.8 G/DL (ref 6.1–8.1)
PROT SERPL-MCNC: 6.8 G/DL (ref 6.1–8.1)
PROTHROMBIN TIME: 12.2 SEC (ref 9–11.5)
PT IMM NP PPP: ABNORMAL SEC
RBC # BLD AUTO: 3.75 MILLION/UL (ref 3.8–5.1)
SCREEN APTT: 66 SEC
SCREEN APTT: 74 SEC
SCREEN DRVVT: 45 SEC
SL AMB EGFR AFRICAN AMERICAN: 44 ML/MIN/1.73M2
SL AMB EGFR NON AFRICAN AMERICAN: 38 ML/MIN/1.73M2
SODIUM SERPL-SCNC: 139 MMOL/L (ref 135–146)
THROMBIN TIME: 17 SEC (ref 13–19)
WBC # BLD AUTO: 5.6 THOUSAND/UL (ref 3.8–10.8)

## 2019-06-07 ENCOUNTER — TELEPHONE (OUTPATIENT)
Dept: OBGYN CLINIC | Facility: CLINIC | Age: 74
End: 2019-06-07

## 2019-06-07 DIAGNOSIS — M17.0 PRIMARY OSTEOARTHRITIS OF BOTH KNEES: Primary | ICD-10-CM

## 2019-06-07 DIAGNOSIS — M16.11 PRIMARY OSTEOARTHRITIS OF ONE HIP, RIGHT: ICD-10-CM

## 2019-06-12 ENCOUNTER — TELEPHONE (OUTPATIENT)
Dept: OBGYN CLINIC | Facility: CLINIC | Age: 74
End: 2019-06-12

## 2019-06-14 ENCOUNTER — TELEPHONE (OUTPATIENT)
Dept: OBGYN CLINIC | Facility: CLINIC | Age: 74
End: 2019-06-14

## 2019-06-14 ENCOUNTER — TELEPHONE (OUTPATIENT)
Dept: OBGYN CLINIC | Facility: HOSPITAL | Age: 74
End: 2019-06-14

## 2019-06-18 ENCOUNTER — TELEPHONE (OUTPATIENT)
Dept: OBGYN CLINIC | Facility: CLINIC | Age: 74
End: 2019-06-18

## 2019-06-19 ENCOUNTER — TELEPHONE (OUTPATIENT)
Dept: OBGYN CLINIC | Facility: CLINIC | Age: 74
End: 2019-06-19

## 2019-06-19 DIAGNOSIS — M16.11 PRIMARY OSTEOARTHRITIS OF ONE HIP, RIGHT: Primary | ICD-10-CM

## 2019-06-24 LAB
ABO GROUP BLD: NORMAL
ALBUMIN SERPL-MCNC: 4 G/DL (ref 3.6–5.1)
ALBUMIN/GLOB SERPL: 1.5 (CALC) (ref 1–2.5)
ALP SERPL-CCNC: 79 U/L (ref 33–130)
ALT SERPL-CCNC: 15 U/L (ref 6–29)
APPEARANCE UR: CLEAR
APTT PPP: 46 SEC (ref 22–34)
AST SERPL-CCNC: 20 U/L (ref 10–35)
BACTERIA UR CULT: NORMAL
BACTERIA UR QL AUTO: ABNORMAL /HPF
BASOPHILS # BLD AUTO: 59 CELLS/UL (ref 0–200)
BASOPHILS NFR BLD AUTO: 1.1 %
BILIRUB SERPL-MCNC: 0.5 MG/DL (ref 0.2–1.2)
BILIRUB UR QL STRIP: NEGATIVE
BLD GP AB SCN SERPL QL: NORMAL
BUN SERPL-MCNC: 29 MG/DL (ref 7–25)
BUN/CREAT SERPL: 23 (CALC) (ref 6–22)
CALCIUM SERPL-MCNC: 9.2 MG/DL (ref 8.6–10.4)
CHLORIDE SERPL-SCNC: 108 MMOL/L (ref 98–110)
CO2 SERPL-SCNC: 20 MMOL/L (ref 20–32)
COLOR UR: YELLOW
CREAT SERPL-MCNC: 1.28 MG/DL (ref 0.6–0.93)
CRP SERPL-MCNC: 2.6 MG/L
EOSINOPHIL # BLD AUTO: 103 CELLS/UL (ref 15–500)
EOSINOPHIL NFR BLD AUTO: 1.9 %
ERYTHROCYTE [DISTWIDTH] IN BLOOD BY AUTOMATED COUNT: 12.9 % (ref 11–15)
ERYTHROCYTE [SEDIMENTATION RATE] IN BLOOD BY WESTERGREN METHOD: 33 MM/H
EST. AVERAGE GLUCOSE BLD GHB EST-MCNC: 103 (CALC)
EST. AVERAGE GLUCOSE BLD GHB EST-SCNC: 5.7 (CALC)
GLOBULIN SER CALC-MCNC: 2.7 G/DL (CALC) (ref 1.9–3.7)
GLUCOSE SERPL-MCNC: 99 MG/DL (ref 65–99)
GLUCOSE UR QL STRIP: NEGATIVE
HBA1C MFR BLD: 5.2 % OF TOTAL HGB
HCT VFR BLD AUTO: 36.3 % (ref 35–45)
HGB BLD-MCNC: 12.1 G/DL (ref 11.7–15.5)
HGB UR QL STRIP: NEGATIVE
HYALINE CASTS #/AREA URNS LPF: ABNORMAL /LPF
INR PPP: 1.1
KETONES UR QL STRIP: NEGATIVE
LEUKOCYTE ESTERASE UR QL STRIP: ABNORMAL
LYMPHOCYTES # BLD AUTO: 1172 CELLS/UL (ref 850–3900)
LYMPHOCYTES NFR BLD AUTO: 21.7 %
MCH RBC QN AUTO: 30.9 PG (ref 27–33)
MCHC RBC AUTO-ENTMCNC: 33.3 G/DL (ref 32–36)
MCV RBC AUTO: 92.8 FL (ref 80–100)
MONOCYTES # BLD AUTO: 346 CELLS/UL (ref 200–950)
MONOCYTES NFR BLD AUTO: 6.4 %
NEUTROPHILS # BLD AUTO: 3721 CELLS/UL (ref 1500–7800)
NEUTROPHILS NFR BLD AUTO: 68.9 %
NITRITE UR QL STRIP: NEGATIVE
PH UR STRIP: 5.5 [PH] (ref 5–8)
PLATELET # BLD AUTO: 217 THOUSAND/UL (ref 140–400)
PMV BLD REES-ECKER: 11.1 FL (ref 7.5–12.5)
POTASSIUM SERPL-SCNC: 4.9 MMOL/L (ref 3.5–5.3)
PROT SERPL-MCNC: 6.7 G/DL (ref 6.1–8.1)
PROT UR QL STRIP: NEGATIVE
PROTHROMBIN TIME: 11.2 SEC (ref 9–11.5)
RBC # BLD AUTO: 3.91 MILLION/UL (ref 3.8–5.1)
RBC #/AREA URNS HPF: ABNORMAL /HPF
RH BLD: NORMAL
SL AMB EGFR AFRICAN AMERICAN: 48 ML/MIN/1.73M2
SL AMB EGFR NON AFRICAN AMERICAN: 41 ML/MIN/1.73M2
SODIUM SERPL-SCNC: 137 MMOL/L (ref 135–146)
SP GR UR STRIP: 1.01 (ref 1–1.03)
SQUAMOUS #/AREA URNS HPF: ABNORMAL /HPF
WBC # BLD AUTO: 5.4 THOUSAND/UL (ref 3.8–10.8)
WBC #/AREA URNS HPF: ABNORMAL /HPF

## 2019-06-27 ENCOUNTER — OFFICE VISIT (OUTPATIENT)
Dept: OBGYN CLINIC | Facility: CLINIC | Age: 74
End: 2019-06-27
Payer: MEDICARE

## 2019-06-27 VITALS
WEIGHT: 204.4 LBS | DIASTOLIC BLOOD PRESSURE: 80 MMHG | SYSTOLIC BLOOD PRESSURE: 131 MMHG | BODY MASS INDEX: 34.89 KG/M2 | HEIGHT: 64 IN

## 2019-06-27 DIAGNOSIS — M16.11 PRIMARY OSTEOARTHRITIS OF ONE HIP, RIGHT: Primary | ICD-10-CM

## 2019-06-27 PROCEDURE — 99215 OFFICE O/P EST HI 40 MIN: CPT | Performed by: ORTHOPAEDIC SURGERY

## 2019-06-27 PROCEDURE — 1123F ACP DISCUSS/DSCN MKR DOCD: CPT | Performed by: PHYSICIAN ASSISTANT

## 2019-06-27 RX ORDER — CEFAZOLIN SODIUM 2 G/50ML
2000 SOLUTION INTRAVENOUS ONCE
Status: CANCELLED | OUTPATIENT
Start: 2019-07-08 | End: 2019-06-27

## 2019-06-27 RX ORDER — CHLORHEXIDINE GLUCONATE 4 G/100ML
SOLUTION TOPICAL DAILY PRN
Status: CANCELLED | OUTPATIENT
Start: 2019-07-08

## 2019-06-27 RX ORDER — CHLORHEXIDINE GLUCONATE 0.12 MG/ML
15 RINSE ORAL ONCE
Status: CANCELLED | OUTPATIENT
Start: 2019-07-08 | End: 2019-06-27

## 2019-06-27 RX ORDER — SODIUM CHLORIDE, SODIUM LACTATE, POTASSIUM CHLORIDE, CALCIUM CHLORIDE 600; 310; 30; 20 MG/100ML; MG/100ML; MG/100ML; MG/100ML
125 INJECTION, SOLUTION INTRAVENOUS CONTINUOUS
Status: CANCELLED | OUTPATIENT
Start: 2019-07-08

## 2019-06-27 NOTE — H&P (VIEW-ONLY)
Assessment:     1  Primary osteoarthritis of one hip, right          Plan:     Problem List Items Addressed This Visit        Musculoskeletal and Integument    Primary osteoarthritis of one hip, right - Primary     71-year-old female with severe right hip osteoarthritis  We reviewed her radiographs, laboratory workup, consultation notes today  We went over her increased risk given her multiple medical problems including her chronic kidney failure and her clotting disorder  She vocalizes an appropriate understanding of these issues and how they could affect her recovery  Further risks and benefits of surgery were discussed with her  She did wish to proceed  Informed consent was obtained  At this point, I feel that she is likely medically maximized and that it is appropriate to proceed with surgery  We will have final clearance done by Internal Medicine prior to proceeding  Relevant Orders    Case request operating room: ARTHROPLASTY HIP TOTAL (Completed)    Type and screen    Ambulatory referral to [facility]           Patient ID: Joseph Dewitt is a 69 y o  female  Chief Complaint:  Right hip pain    HPI:  71-year-old female who comes in today for follow-up of her right hip osteoarthritis  She was scheduled for a right total hip arthroplasty, but had a significantly elevated PTT  Her surgery was canceled  She has seen Hematology who feels that she is not at any risk of increased bleeding, but rather increased risk of clotting  They feel that it is appropriate for her to have surgery, and recommend low molecular weight heparin for six weeks postoperatively  Patient is anxious to proceed with a hip replacement  She states that she understands she is at increased risk with several things        Allergy:  Allergies   Allergen Reactions    Copper Rash    Meperidine Nausea Only     Other reaction(s): Nausea    Pt reports she is ok with Demerol now    Nickel Rash       Medications:  all current active meds have been reviewed    Past Medical History:  Past Medical History:   Diagnosis Date    Anxiety     Asymptomatic gallstones     Chronic pain     Depression 10/22/2015    Hyperlipidemia     Hypertension     Primary localized osteoarthritis of right knee 2/6/2018    Psychiatric disorder     Spinal stenosis     Stage III chronic kidney disease ([facility] [address] ) 4/9/2019       Past Surgical History:  Past Surgical History:   Procedure Laterality Date    APPENDECTOMY      COLONOSCOPY      IA COLONOSCOPY FLX DX W/COLLJ SPEC WHEN PFRMD N/A 3/13/2019    Procedure: COLONOSCOPY with biopsy;  Surgeon: José Manuel Noel MD;  Location: [facility];  Service: Gastroenterology    IA ESOPHAGOGASTRODUODENOSCOPY TRANSORAL DIAGNOSTIC N/A 3/27/2019    Procedure: ESOPHAGOGASTRODUODENOSCOPY (EGD)  with pediatric colonoscope with clotest and jejunum/duodenum bx r/o celiac;  Surgeon: Yulissa Darden MD;  Location: [facility];  Service: Gastroenterology    TOE SURGERY         Family History:  Family History   Problem Relation Age of Onset    Breast cancer additional onset Mother     Diabetes Mother     No Known Problems Father     Alcohol abuse Neg Hx     Substance Abuse Neg Hx     Colon cancer Neg Hx     Colon polyps Neg Hx        Social History:  Social History     Substance and Sexual Activity   Alcohol Use No     Social History     Substance and Sexual Activity   Drug Use No     Social History     Tobacco Use   Smoking Status Never Smoker   Smokeless Tobacco Never Used           ROS:  Review of Systems   Constitutional: Negative  HENT: Negative  Eyes: Negative  Respiratory: Negative  Cardiovascular: Negative  Gastrointestinal: Negative  Endocrine: Negative  Genitourinary: Negative  Musculoskeletal: Positive for arthralgias  Skin: Negative  Allergic/Immunologic: Negative  Neurological: Negative  Hematological: Negative  Psychiatric/Behavioral: Negative          Objective:  BP Readings from Last 1 Encounters:   06/27/19 131/80      Wt Readings from Last 1 Encounters:   06/27/19 92 7 kg (204 lb 6 4 oz)        BMI:   Estimated body mass index is 35 09 kg/m² as calculated from the following:    Height as of this encounter: 5' 4" (1 626 m)  Weight as of this encounter: 92 7 kg (204 lb 6 4 oz)  EXAM:   Physical Exam   Constitutional: She appears well-developed and well-nourished  No distress  HENT:   Head: Normocephalic and atraumatic  Eyes: Right eye exhibits no discharge  Left eye exhibits no discharge  Neck: Normal range of motion  Neck supple  No tracheal deviation present  Cardiovascular: Normal rate, regular rhythm and normal heart sounds  Pulmonary/Chest: Effort normal and breath sounds normal  No respiratory distress  She has no wheezes  She exhibits no tenderness  Abdominal: Soft  She exhibits no distension  There is no tenderness  Neurological: She is alert  Skin: Skin is warm and dry  She is not diaphoretic  No erythema  Psychiatric: She has a normal mood and affect  Her behavior is normal    Vitals reviewed  Right Hip Exam     Comments:  Patient with severe pain with any hip range of motion  Hip flexion to 80°, internal rotation to 20°, external rotation to 10°  Pain at extremes of motion and throughout motion  She has some tenderness to palpation directly over the anterior aspect of her hip  Flexion extension of the toes is intact  Significant difficulty getting up out of a chair  Radiographs:  I have personally reviewed pertinent films in PACS and my interpretation is X-rays show severe osteoarthritis of the right hip with complete loss of the joint space, subluxation of the head laterally, and subchondral cysts

## 2019-07-02 ENCOUNTER — OFFICE VISIT (OUTPATIENT)
Dept: FAMILY MEDICINE CLINIC | Facility: HOSPITAL | Age: 74
End: 2019-07-02
Payer: MEDICARE

## 2019-07-02 ENCOUNTER — TELEPHONE (OUTPATIENT)
Dept: OBGYN CLINIC | Facility: HOSPITAL | Age: 74
End: 2019-07-02

## 2019-07-02 VITALS
WEIGHT: 204 LBS | DIASTOLIC BLOOD PRESSURE: 68 MMHG | HEIGHT: 64 IN | BODY MASS INDEX: 34.83 KG/M2 | HEART RATE: 58 BPM | SYSTOLIC BLOOD PRESSURE: 108 MMHG

## 2019-07-02 DIAGNOSIS — I10 ESSENTIAL HYPERTENSION: ICD-10-CM

## 2019-07-02 DIAGNOSIS — M16.11 PRIMARY OSTEOARTHRITIS OF RIGHT HIP: ICD-10-CM

## 2019-07-02 DIAGNOSIS — Z01.818 PRE-OP EXAMINATION: Primary | ICD-10-CM

## 2019-07-02 DIAGNOSIS — F32.89 OTHER DEPRESSION: ICD-10-CM

## 2019-07-02 DIAGNOSIS — R79.1 PROLONGED PTT (PARTIAL THROMBOPLASTIN TIME): ICD-10-CM

## 2019-07-02 DIAGNOSIS — N18.30 STAGE III CHRONIC KIDNEY DISEASE (HCC): ICD-10-CM

## 2019-07-02 PROCEDURE — 99214 OFFICE O/P EST MOD 30 MIN: CPT | Performed by: INTERNAL MEDICINE

## 2019-07-02 NOTE — PROGRESS NOTES
PRE-OPERATIVE EVALUATION  [facility] PHYSICIAN GROUP [place] [address] [address] [facility]    NAME: Terrell Asif  AGE: 69 y o  SEX: female  : 1945     DATE: 2019    Internal Medicine Pre-Operative Evaluation      Chief Complaint: Pre-operative Evaluation     Surgery: R THR  Anticipated Date of Surgery: 19  Referring Provider: Radha Blount MD       History of Present Illness:     Terrell Asif is a 69 y o  female who presents to the office today for a preoperative consultation at the request of surgeon, Dr Dianelys England  Patient has a bleeding risk of: no recent abnormal bleeding  Patient does not have objections to receiving blood products if needed  Current anti-platelet/anti-coagulation medications that the patient is prescribed includes: none  Assessment of Chronic Conditions:   - Hypertension: stable     Assessment of Cardiac Risk:  · Denies unstable or severe angina or MI in the last 6 weeks or history of stent placement in the last year   ·      Exercise Capacity:  · Able to walk 4 blocks without symptoms?: Yes  · Able to walk 2 flights without symptoms?: Yes    Prior Anesthesia Reactions: No     Personal history of venous thromboembolic disease? No    History of steroid use for >2 weeks within last year? No         Review of Systems:     Review of Systems   Constitutional: Negative for fatigue and fever  HENT: Negative for hearing loss  Eyes: Negative for visual disturbance  Respiratory: Negative for cough, chest tightness, shortness of breath and wheezing  Cardiovascular: Negative for chest pain, palpitations and leg swelling  Gastrointestinal: Negative for abdominal pain, diarrhea and nausea  Genitourinary: Negative for dysuria and hematuria  Musculoskeletal: Positive for arthralgias, gait problem and joint swelling  Neurological: Negative for dizziness, numbness and headaches  Psychiatric/Behavioral: Negative for confusion and dysphoric mood     All other systems reviewed and are negative  Current Problem List:     Patient Active Problem List   Diagnosis    Primary localized osteoarthritis of right knee    Primary localized osteoarthritis of left knee    Lumbar spondylosis    Osteoarthritis of both knees    Leg pain, bilateral    Rectal prolapse    Essential hypertension    Heart palpitations    Diarrhea    Depression    Anxiety    Anemia, iron deficiency, inadequate dietary intake    Right lower quadrant abdominal pain    Abnormal CT scan    Primary osteoarthritis of right hip    Stage III chronic kidney disease (HCC)    Prolonged PTT (partial thromboplastin time)    Pre-op examination       Allergies:      Allergies   Allergen Reactions    Copper Rash    Meperidine Nausea Only     Other reaction(s): Nausea    Pt reports she is ok with Demerol now    Nickel Rash       Current Medications:       Current Outpatient Medications:     ascorbic acid (VITAMIN C) 500 mg tablet, Take 1 tablet (500 mg total) by mouth 2 (two) times a day (Patient taking differently: Take 500 mg by mouth daily ), Disp: 60 tablet, Rfl: 2    citalopram (CeleXA) 10 mg tablet, Take 2 tablets (20 mg total) by mouth daily, Disp: 90 tablet, Rfl: 0    ferrous sulfate 324 (65 Fe) mg, Take 1 tablet (324 mg total) by mouth 2 (two) times a day before meals (Patient taking differently: Take 324 mg by mouth daily before breakfast ), Disp: 60 tablet, Rfl: 2    folic acid (FOLVITE) 1 mg tablet, Take 1 tablet (1 mg total) by mouth daily, Disp: 30 tablet, Rfl: 2    indapamide (LOZOL) 2 5 mg tablet, Take 1 tablet (2 5 mg total) by mouth daily, Disp: 90 tablet, Rfl: 1    lisinopril (ZESTRIL) 20 mg tablet, Take 1 tablet (20 mg total) by mouth daily, Disp: 90 tablet, Rfl: 1    Multiple Vitamin (MULTI-VITAMIN) tablet, Take 1 tablet by mouth daily, Disp: 30 tablet, Rfl: 2    pantoprazole (PROTONIX) 40 mg tablet, Take 1 tablet (40 mg total) by mouth daily for 30 days, Disp: 30 tablet, Rfl: 3    Past Medical History:       Past Medical History:   Diagnosis Date    Anxiety     Asymptomatic gallstones     Chronic pain     Depression 10/22/2015    Hyperlipidemia     Hypertension     Primary localized osteoarthritis of right knee 2/6/2018    Psychiatric disorder     Spinal stenosis     Stage III chronic kidney disease ([facility] [address] ) 4/9/2019        Past Surgical History:   Procedure Laterality Date    APPENDECTOMY      COLONOSCOPY      TX COLONOSCOPY FLX DX W/COLLJ SPEC WHEN PFRMD N/A 3/13/2019    Procedure: COLONOSCOPY with biopsy;  Surgeon: Jian Perera MD;  [facility];  Service: Gastroenterology    TX ESOPHAGOGASTRODUODENOSCOPY TRANSORAL DIAGNOSTIC N/A 3/27/2019    Procedure: ESOPHAGOGASTRODUODENOSCOPY (EGD)  with pediatric colonoscope with clotest and jejunum/duodenum bx r/o celiac;  Surgeon: Lorraine Glover MD;  [facility];  Service: Gastroenterology    TOE SURGERY          Family History   Problem Relation Age of Onset    Breast cancer additional onset Mother     Diabetes Mother     No Known Problems Father     Alcohol abuse Neg Hx     Substance Abuse Neg Hx     Colon cancer Neg Hx     Colon polyps Neg Hx         Social History     Socioeconomic History    Marital status: /Civil Union     Spouse name: Not on file    Number of children: Not on file    Years of education: Not on file    Highest education level: Not on file   Occupational History    Not on file   Social Needs    Financial resource strain: Not on file    Food insecurity:     Worry: Not on file     Inability: Not on file    Transportation needs:     Medical: Not on file     Non-medical: Not on file   Tobacco Use    Smoking status: Never Smoker    Smokeless tobacco: Never Used   Substance and Sexual Activity    Alcohol use: No    Drug use: No    Sexual activity: Not Currently   Lifestyle    Physical activity:     Days per week: Not on file     Minutes per session: Not on file    Stress: Not on file   Relationships    Social connections:     Talks on phone: Not on file     Gets together: Not on file     Attends Buddhism service: Not on file     Active member of club or organization: Not on file     Attends meetings of clubs or organizations: Not on file     Relationship status: Not on file    Intimate partner violence:     Fear of current or ex partner: Not on file     Emotionally abused: Not on file     Physically abused: Not on file     Forced sexual activity: Not on file   Other Topics Concern    Not on file   Social History Narrative    Lives alone   in SNF    Feels safe at home  Physical Exam:     /68   Pulse 58   Ht 5' 4" (1 626 m)   Wt 92 5 kg (204 lb)   LMP  (LMP Unknown)   BMI 35 02 kg/m²     Physical Exam   Constitutional: She is oriented to person, place, and time  She appears well-developed and well-nourished  Eyes: Pupils are equal, round, and reactive to light  Neck: Normal range of motion  Neck supple  No thyromegaly present  Cardiovascular: Normal rate, regular rhythm, normal heart sounds and intact distal pulses  No murmur heard  Pulmonary/Chest: Effort normal and breath sounds normal  She has no wheezes  She has no rales  Abdominal: Soft  Bowel sounds are normal  There is no tenderness  Musculoskeletal: Normal range of motion  She exhibits tenderness  She exhibits no edema or deformity  Lymphadenopathy:     She has no cervical adenopathy  Neurological: She is alert and oriented to person, place, and time  She has normal reflexes  Skin: Skin is warm and dry  Psychiatric: She has a normal mood and affect  Nursing note and vitals reviewed  Data:     Pre-operative work-up    Laboratory Results: I have personally reviewed the pertinent laboratory results/reports   Pertinent labs reviewed:  A1C     5 2             , GFR    41  EKG: I have personally reviewed pertinent reports      EKG is normal, sinus rhythm    Chest x-ray: n/a      ·       Assessment & Recommendations:     1  Pre-op examination     2  Primary osteoarthritis of right hip     3  Essential hypertension     4  Stage III chronic kidney disease ([address] )     5  Other depression     6  Prolonged PTT (partial thromboplastin time)         Pre-Op Evaluation Assessment  69 y o  female with planned surgery: R THR  Known risk factors for perioperative complications: None  Current medications which may produce withdrawal symptoms if withheld perioperatively: none  Pre-Op Evaluation Plan  1  Further preoperative workup as follows:   - None; no further preoperative work-up is required    2  Medication Management/Recommendations:   - None, continue medication regimen including morning of surgery, with sip of water    3  Prophylaxis for cardiac events with perioperative beta-blockers: not indicated  4  Patient requires further consultation with: None    Clearance  Patient is CLEARED for surgery without any additional cardiac testing       Luis Fisher MD  [address] [place] [address] [facility]  [address] [place] [address]  Phone#  [phone]  Fax#  [phone]

## 2019-07-02 NOTE — PRE-PROCEDURE INSTRUCTIONS
No outpatient medications have been marked as taking for the 7/8/19 encounter [facility] HOSPITAL Encounter)  Pt returned for review of ortho class/geriatriac assessment  Pt arrived with daughter as caregiver  Pt reports having ramp placed outside home, avoiding steps into home  A shower and toilet now in place on first floor  Pt has walker and commode  Pt plans to have rehab placement post-op secondary to need for blood thinners  Pt asked appropriate questions, anxious but reasonable and easily directed  Message left for Care Management team as to new surgical date and plan for rehab

## 2019-07-02 NOTE — PATIENT INSTRUCTIONS
Your visit today was for pre-operative clearance  The purpose is to be sure that there are no significant medical issues which would increase the risk of the surgery  The doctor will review pre-op labs tests as well as your medications and other medical history  Once the doctor is reassured that there is nothing of concern to address, she will inform the surgeon that you are cleared to have this surgery  Please be sure you follow up with any additional tests that might have been ordered, and make any medication adjustments that were discussed

## 2019-07-05 ENCOUNTER — ANESTHESIA EVENT (OUTPATIENT)
Dept: PERIOP | Facility: HOSPITAL | Age: 74
DRG: 470 | End: 2019-07-05
Payer: MEDICARE

## 2019-07-05 DIAGNOSIS — N18.30 CHRONIC RENAL DISEASE, STAGE 3, MODERATELY DECREASED GLOMERULAR FILTRATION RATE (GFR) BETWEEN 30-59 ML/MIN/1.73 SQUARE METER (HCC): Primary | ICD-10-CM

## 2019-07-08 ENCOUNTER — APPOINTMENT (INPATIENT)
Dept: RADIOLOGY | Facility: HOSPITAL | Age: 74
DRG: 470 | End: 2019-07-08
Payer: MEDICARE

## 2019-07-08 ENCOUNTER — HOSPITAL ENCOUNTER (INPATIENT)
Facility: HOSPITAL | Age: 74
LOS: 4 days | Discharge: NURSING, REHAB OR LONG-TERM CARE | DRG: 470 | End: 2019-07-12
Attending: ORTHOPAEDIC SURGERY | Admitting: ORTHOPAEDIC SURGERY
Payer: MEDICARE

## 2019-07-08 ENCOUNTER — ANESTHESIA (OUTPATIENT)
Dept: PERIOP | Facility: HOSPITAL | Age: 74
DRG: 470 | End: 2019-07-08
Payer: MEDICARE

## 2019-07-08 DIAGNOSIS — M16.11 PRIMARY OSTEOARTHRITIS OF RIGHT HIP: ICD-10-CM

## 2019-07-08 DIAGNOSIS — M16.11 PRIMARY OSTEOARTHRITIS OF ONE HIP, RIGHT: ICD-10-CM

## 2019-07-08 DIAGNOSIS — N18.30 STAGE III CHRONIC KIDNEY DISEASE (HCC): Primary | ICD-10-CM

## 2019-07-08 LAB
ABO GROUP BLD: NORMAL
BLD GP AB SCN SERPL QL: NEGATIVE
ERYTHROCYTE [DISTWIDTH] IN BLOOD BY AUTOMATED COUNT: 13.4 % (ref 11.6–15.1)
HCT VFR BLD AUTO: 25 % (ref 34.8–46.1)
HGB BLD-MCNC: 8.4 G/DL (ref 11.5–15.4)
MCH RBC QN AUTO: 32.4 PG (ref 26.8–34.3)
MCHC RBC AUTO-ENTMCNC: 33.6 G/DL (ref 31.4–37.4)
MCV RBC AUTO: 97 FL (ref 82–98)
PLATELET # BLD AUTO: 154 THOUSANDS/UL (ref 149–390)
PMV BLD AUTO: 9.9 FL (ref 8.9–12.7)
RBC # BLD AUTO: 2.59 MILLION/UL (ref 3.81–5.12)
RH BLD: POSITIVE
SPECIMEN EXPIRATION DATE: NORMAL
WBC # BLD AUTO: 9.97 THOUSAND/UL (ref 4.31–10.16)

## 2019-07-08 PROCEDURE — 0SRA03Z REPLACEMENT OF RIGHT HIP JOINT, ACETABULAR SURFACE WITH CERAMIC SYNTHETIC SUBSTITUTE, OPEN APPROACH: ICD-10-PCS | Performed by: ORTHOPAEDIC SURGERY

## 2019-07-08 PROCEDURE — 99222 1ST HOSP IP/OBS MODERATE 55: CPT | Performed by: INTERNAL MEDICINE

## 2019-07-08 PROCEDURE — G8978 MOBILITY CURRENT STATUS: HCPCS

## 2019-07-08 PROCEDURE — 85027 COMPLETE CBC AUTOMATED: CPT | Performed by: NURSE PRACTITIONER

## 2019-07-08 PROCEDURE — C1776 JOINT DEVICE (IMPLANTABLE): HCPCS | Performed by: ORTHOPAEDIC SURGERY

## 2019-07-08 PROCEDURE — 97163 PT EVAL HIGH COMPLEX 45 MIN: CPT

## 2019-07-08 PROCEDURE — 97530 THERAPEUTIC ACTIVITIES: CPT

## 2019-07-08 PROCEDURE — 86923 COMPATIBILITY TEST ELECTRIC: CPT

## 2019-07-08 PROCEDURE — 86900 BLOOD TYPING SEROLOGIC ABO: CPT | Performed by: ORTHOPAEDIC SURGERY

## 2019-07-08 PROCEDURE — 86901 BLOOD TYPING SEROLOGIC RH(D): CPT | Performed by: ORTHOPAEDIC SURGERY

## 2019-07-08 PROCEDURE — 27130 TOTAL HIP ARTHROPLASTY: CPT | Performed by: ORTHOPAEDIC SURGERY

## 2019-07-08 PROCEDURE — 72170 X-RAY EXAM OF PELVIS: CPT

## 2019-07-08 PROCEDURE — 27130 TOTAL HIP ARTHROPLASTY: CPT | Performed by: PHYSICIAN ASSISTANT

## 2019-07-08 PROCEDURE — C1713 ANCHOR/SCREW BN/BN,TIS/BN: HCPCS | Performed by: ORTHOPAEDIC SURGERY

## 2019-07-08 PROCEDURE — 86850 RBC ANTIBODY SCREEN: CPT | Performed by: ORTHOPAEDIC SURGERY

## 2019-07-08 PROCEDURE — G8979 MOBILITY GOAL STATUS: HCPCS

## 2019-07-08 DEVICE — BIOLOX DELTA CERAMIC FEMORAL HEAD 32MM DIA +1 12/14 TAPER
Type: IMPLANTABLE DEVICE | Site: HIP | Status: FUNCTIONAL
Brand: BIOLOX DELTA

## 2019-07-08 DEVICE — PINNACLE CANCELLOUS BONE SCREW 6.5MM X 25MM
Type: IMPLANTABLE DEVICE | Site: HIP | Status: FUNCTIONAL
Brand: PINNACLE

## 2019-07-08 DEVICE — PINNACLE POROCOAT ACETABULAR SHELL SECTOR II 50MM OD
Type: IMPLANTABLE DEVICE | Site: HIP | Status: FUNCTIONAL
Brand: PINNACLE POROCOAT

## 2019-07-08 DEVICE — PINNACLE HIP SOLUTIONS ALTRX POLYETHYLENE ACETABULAR LINER NEUTRAL 32MM ID 50MM OD
Type: IMPLANTABLE DEVICE | Site: HIP | Status: FUNCTIONAL
Brand: PINNACLE ALTRX

## 2019-07-08 DEVICE — TRI-LOCK BPS FEMORAL STEM 12/14 TAPER TRI-LOCK BPS W/GRIPTION SIZE 6 STD 107MM
Type: IMPLANTABLE DEVICE | Site: HIP | Status: FUNCTIONAL
Brand: TRI-LOCK GRIPTION

## 2019-07-08 DEVICE — PINNACLE CANCELLOUS BONE SCREW 6.5MM X 30MM
Type: IMPLANTABLE DEVICE | Site: HIP | Status: FUNCTIONAL
Brand: PINNACLE

## 2019-07-08 RX ORDER — SIMETHICONE 80 MG
80 TABLET,CHEWABLE ORAL 4 TIMES DAILY PRN
Status: DISCONTINUED | OUTPATIENT
Start: 2019-07-08 | End: 2019-07-12 | Stop reason: HOSPADM

## 2019-07-08 RX ORDER — FENTANYL CITRATE/PF 50 MCG/ML
50 SYRINGE (ML) INJECTION
Status: DISCONTINUED | OUTPATIENT
Start: 2019-07-08 | End: 2019-07-08 | Stop reason: HOSPADM

## 2019-07-08 RX ORDER — HEPARIN SODIUM 5000 [USP'U]/ML
5000 INJECTION, SOLUTION INTRAVENOUS; SUBCUTANEOUS EVERY 8 HOURS SCHEDULED
Status: DISCONTINUED | OUTPATIENT
Start: 2019-07-08 | End: 2019-07-12 | Stop reason: HOSPADM

## 2019-07-08 RX ORDER — MIDAZOLAM HYDROCHLORIDE 1 MG/ML
INJECTION INTRAMUSCULAR; INTRAVENOUS AS NEEDED
Status: DISCONTINUED | OUTPATIENT
Start: 2019-07-08 | End: 2019-07-08 | Stop reason: SURG

## 2019-07-08 RX ORDER — MAGNESIUM HYDROXIDE/ALUMINUM HYDROXICE/SIMETHICONE 120; 1200; 1200 MG/30ML; MG/30ML; MG/30ML
30 SUSPENSION ORAL EVERY 6 HOURS PRN
Status: DISCONTINUED | OUTPATIENT
Start: 2019-07-08 | End: 2019-07-12 | Stop reason: HOSPADM

## 2019-07-08 RX ORDER — BISACODYL 10 MG
10 SUPPOSITORY, RECTAL RECTAL DAILY PRN
Status: DISCONTINUED | OUTPATIENT
Start: 2019-07-08 | End: 2019-07-12 | Stop reason: HOSPADM

## 2019-07-08 RX ORDER — EPHEDRINE SULFATE 50 MG/ML
INJECTION INTRAVENOUS AS NEEDED
Status: DISCONTINUED | OUTPATIENT
Start: 2019-07-08 | End: 2019-07-08 | Stop reason: SURG

## 2019-07-08 RX ORDER — ONDANSETRON 2 MG/ML
4 INJECTION INTRAMUSCULAR; INTRAVENOUS ONCE AS NEEDED
Status: DISCONTINUED | OUTPATIENT
Start: 2019-07-08 | End: 2019-07-08 | Stop reason: HOSPADM

## 2019-07-08 RX ORDER — ACETAMINOPHEN 325 MG/1
650 TABLET ORAL EVERY 6 HOURS PRN
Status: DISCONTINUED | OUTPATIENT
Start: 2019-07-08 | End: 2019-07-12 | Stop reason: HOSPADM

## 2019-07-08 RX ORDER — PROPOFOL 10 MG/ML
INJECTION, EMULSION INTRAVENOUS AS NEEDED
Status: DISCONTINUED | OUTPATIENT
Start: 2019-07-08 | End: 2019-07-08 | Stop reason: SURG

## 2019-07-08 RX ORDER — CEFAZOLIN SODIUM 2 G/50ML
2000 SOLUTION INTRAVENOUS EVERY 8 HOURS
Status: COMPLETED | OUTPATIENT
Start: 2019-07-08 | End: 2019-07-08

## 2019-07-08 RX ORDER — CITALOPRAM 20 MG/1
20 TABLET ORAL DAILY
Status: DISCONTINUED | OUTPATIENT
Start: 2019-07-09 | End: 2019-07-12 | Stop reason: HOSPADM

## 2019-07-08 RX ORDER — INDAPAMIDE 1.25 MG/1
2.5 TABLET, FILM COATED ORAL DAILY
Status: DISCONTINUED | OUTPATIENT
Start: 2019-07-08 | End: 2019-07-08

## 2019-07-08 RX ORDER — HYDROMORPHONE HCL/PF 1 MG/ML
0.5 SYRINGE (ML) INJECTION
Status: DISCONTINUED | OUTPATIENT
Start: 2019-07-08 | End: 2019-07-08 | Stop reason: HOSPADM

## 2019-07-08 RX ORDER — FERROUS SULFATE 325(65) MG
325 TABLET ORAL 2 TIMES DAILY WITH MEALS
Status: DISCONTINUED | OUTPATIENT
Start: 2019-07-08 | End: 2019-07-12 | Stop reason: HOSPADM

## 2019-07-08 RX ORDER — CHLORHEXIDINE GLUCONATE 4 G/100ML
SOLUTION TOPICAL DAILY PRN
Status: DISCONTINUED | OUTPATIENT
Start: 2019-07-08 | End: 2019-07-08 | Stop reason: HOSPADM

## 2019-07-08 RX ORDER — GABAPENTIN 100 MG/1
100 CAPSULE ORAL EVERY 8 HOURS SCHEDULED
Status: DISCONTINUED | OUTPATIENT
Start: 2019-07-08 | End: 2019-07-12 | Stop reason: HOSPADM

## 2019-07-08 RX ORDER — GLYCOPYRROLATE 0.2 MG/ML
INJECTION INTRAMUSCULAR; INTRAVENOUS AS NEEDED
Status: DISCONTINUED | OUTPATIENT
Start: 2019-07-08 | End: 2019-07-08 | Stop reason: SURG

## 2019-07-08 RX ORDER — SENNOSIDES 8.6 MG
1 TABLET ORAL DAILY
Status: DISCONTINUED | OUTPATIENT
Start: 2019-07-08 | End: 2019-07-12 | Stop reason: HOSPADM

## 2019-07-08 RX ORDER — DOCUSATE SODIUM 100 MG/1
100 CAPSULE, LIQUID FILLED ORAL 2 TIMES DAILY
Status: DISCONTINUED | OUTPATIENT
Start: 2019-07-08 | End: 2019-07-12 | Stop reason: HOSPADM

## 2019-07-08 RX ORDER — LISINOPRIL 20 MG/1
20 TABLET ORAL DAILY
Status: DISCONTINUED | OUTPATIENT
Start: 2019-07-08 | End: 2019-07-08

## 2019-07-08 RX ORDER — NYSTATIN 100000 [USP'U]/G
POWDER TOPICAL 2 TIMES DAILY
Status: DISCONTINUED | OUTPATIENT
Start: 2019-07-08 | End: 2019-07-12 | Stop reason: HOSPADM

## 2019-07-08 RX ORDER — PANTOPRAZOLE SODIUM 40 MG/1
40 TABLET, DELAYED RELEASE ORAL DAILY
Status: DISCONTINUED | OUTPATIENT
Start: 2019-07-09 | End: 2019-07-12 | Stop reason: HOSPADM

## 2019-07-08 RX ORDER — CHLORHEXIDINE GLUCONATE 0.12 MG/ML
15 RINSE ORAL ONCE
Status: COMPLETED | OUTPATIENT
Start: 2019-07-08 | End: 2019-07-08

## 2019-07-08 RX ORDER — FOLIC ACID 1 MG/1
1 TABLET ORAL DAILY
Status: DISCONTINUED | OUTPATIENT
Start: 2019-07-08 | End: 2019-07-12 | Stop reason: HOSPADM

## 2019-07-08 RX ORDER — CALCIUM CARBONATE 200(500)MG
1000 TABLET,CHEWABLE ORAL DAILY PRN
Status: DISCONTINUED | OUTPATIENT
Start: 2019-07-08 | End: 2019-07-12 | Stop reason: HOSPADM

## 2019-07-08 RX ORDER — FENTANYL CITRATE 50 UG/ML
INJECTION, SOLUTION INTRAMUSCULAR; INTRAVENOUS AS NEEDED
Status: DISCONTINUED | OUTPATIENT
Start: 2019-07-08 | End: 2019-07-08 | Stop reason: SURG

## 2019-07-08 RX ORDER — ONDANSETRON 2 MG/ML
4 INJECTION INTRAMUSCULAR; INTRAVENOUS EVERY 6 HOURS PRN
Status: DISCONTINUED | OUTPATIENT
Start: 2019-07-08 | End: 2019-07-12 | Stop reason: HOSPADM

## 2019-07-08 RX ORDER — OXYCODONE HYDROCHLORIDE 5 MG/1
5 TABLET ORAL EVERY 4 HOURS PRN
Status: DISCONTINUED | OUTPATIENT
Start: 2019-07-08 | End: 2019-07-12 | Stop reason: HOSPADM

## 2019-07-08 RX ORDER — SODIUM CHLORIDE, SODIUM LACTATE, POTASSIUM CHLORIDE, CALCIUM CHLORIDE 600; 310; 30; 20 MG/100ML; MG/100ML; MG/100ML; MG/100ML
125 INJECTION, SOLUTION INTRAVENOUS CONTINUOUS
Status: DISCONTINUED | OUTPATIENT
Start: 2019-07-08 | End: 2019-07-08

## 2019-07-08 RX ORDER — CEFAZOLIN SODIUM 2 G/50ML
2000 SOLUTION INTRAVENOUS ONCE
Status: COMPLETED | OUTPATIENT
Start: 2019-07-08 | End: 2019-07-08

## 2019-07-08 RX ORDER — OXYCODONE HYDROCHLORIDE 5 MG/1
2.5 TABLET ORAL EVERY 4 HOURS PRN
Status: DISCONTINUED | OUTPATIENT
Start: 2019-07-08 | End: 2019-07-12 | Stop reason: HOSPADM

## 2019-07-08 RX ORDER — ONDANSETRON 2 MG/ML
INJECTION INTRAMUSCULAR; INTRAVENOUS AS NEEDED
Status: DISCONTINUED | OUTPATIENT
Start: 2019-07-08 | End: 2019-07-08 | Stop reason: SURG

## 2019-07-08 RX ORDER — SODIUM CHLORIDE, SODIUM LACTATE, POTASSIUM CHLORIDE, CALCIUM CHLORIDE 600; 310; 30; 20 MG/100ML; MG/100ML; MG/100ML; MG/100ML
75 INJECTION, SOLUTION INTRAVENOUS CONTINUOUS
Status: DISCONTINUED | OUTPATIENT
Start: 2019-07-08 | End: 2019-07-10

## 2019-07-08 RX ORDER — DEXAMETHASONE SODIUM PHOSPHATE 4 MG/ML
INJECTION, SOLUTION INTRA-ARTICULAR; INTRALESIONAL; INTRAMUSCULAR; INTRAVENOUS; SOFT TISSUE AS NEEDED
Status: DISCONTINUED | OUTPATIENT
Start: 2019-07-08 | End: 2019-07-08 | Stop reason: SURG

## 2019-07-08 RX ORDER — ASCORBIC ACID 500 MG
500 TABLET ORAL 2 TIMES DAILY
Status: DISCONTINUED | OUTPATIENT
Start: 2019-07-08 | End: 2019-07-12 | Stop reason: HOSPADM

## 2019-07-08 RX ADMIN — OXYCODONE HYDROCHLORIDE AND ACETAMINOPHEN 500 MG: 500 TABLET ORAL at 14:52

## 2019-07-08 RX ADMIN — MIDAZOLAM 1 MG: 1 INJECTION INTRAMUSCULAR; INTRAVENOUS at 08:54

## 2019-07-08 RX ADMIN — MIDAZOLAM 1 MG: 1 INJECTION INTRAMUSCULAR; INTRAVENOUS at 08:49

## 2019-07-08 RX ADMIN — SODIUM CHLORIDE, SODIUM LACTATE, POTASSIUM CHLORIDE, AND CALCIUM CHLORIDE 75 ML/HR: .6; .31; .03; .02 INJECTION, SOLUTION INTRAVENOUS at 12:57

## 2019-07-08 RX ADMIN — FENTANYL CITRATE 50 MCG: 50 INJECTION, SOLUTION INTRAMUSCULAR; INTRAVENOUS at 11:52

## 2019-07-08 RX ADMIN — GLYCOPYRROLATE 0.2 MG: 0.2 INJECTION, SOLUTION INTRAMUSCULAR; INTRAVENOUS at 09:08

## 2019-07-08 RX ADMIN — EPHEDRINE SULFATE 10 MG: 50 INJECTION, SOLUTION INTRAVENOUS at 09:12

## 2019-07-08 RX ADMIN — HEPARIN SODIUM 5000 UNITS: 5000 INJECTION INTRAVENOUS; SUBCUTANEOUS at 21:03

## 2019-07-08 RX ADMIN — PROPOFOL 100 MG: 10 INJECTION, EMULSION INTRAVENOUS at 08:56

## 2019-07-08 RX ADMIN — ACETAMINOPHEN 650 MG: 325 TABLET, FILM COATED ORAL at 15:46

## 2019-07-08 RX ADMIN — FENTANYL CITRATE 25 MCG: 50 INJECTION, SOLUTION INTRAMUSCULAR; INTRAVENOUS at 11:20

## 2019-07-08 RX ADMIN — EPHEDRINE SULFATE 5 MG: 50 INJECTION, SOLUTION INTRAVENOUS at 09:22

## 2019-07-08 RX ADMIN — FENTANYL CITRATE 50 MCG: 50 INJECTION, SOLUTION INTRAMUSCULAR; INTRAVENOUS at 09:29

## 2019-07-08 RX ADMIN — GABAPENTIN 100 MG: 100 CAPSULE ORAL at 21:03

## 2019-07-08 RX ADMIN — GABAPENTIN 100 MG: 100 CAPSULE ORAL at 14:53

## 2019-07-08 RX ADMIN — FENTANYL CITRATE 25 MCG: 50 INJECTION, SOLUTION INTRAMUSCULAR; INTRAVENOUS at 09:57

## 2019-07-08 RX ADMIN — NYSTATIN: 100000 POWDER TOPICAL at 18:46

## 2019-07-08 RX ADMIN — ONDANSETRON 4 MG: 2 INJECTION INTRAMUSCULAR; INTRAVENOUS at 11:08

## 2019-07-08 RX ADMIN — CEFAZOLIN SODIUM 2000 MG: 2 SOLUTION INTRAVENOUS at 09:12

## 2019-07-08 RX ADMIN — CEFAZOLIN SODIUM 2000 MG: 2 SOLUTION INTRAVENOUS at 12:03

## 2019-07-08 RX ADMIN — FERROUS SULFATE TAB 325 MG (65 MG ELEMENTAL FE) 325 MG: 325 (65 FE) TAB at 15:48

## 2019-07-08 RX ADMIN — CHLORHEXIDINE GLUCONATE 0.12% ORAL RINSE 15 ML: 1.2 LIQUID ORAL at 08:00

## 2019-07-08 RX ADMIN — SODIUM CHLORIDE 1000 ML: 0.9 INJECTION, SOLUTION INTRAVENOUS at 14:22

## 2019-07-08 RX ADMIN — SODIUM CHLORIDE, SODIUM LACTATE, POTASSIUM CHLORIDE, AND CALCIUM CHLORIDE: .6; .31; .03; .02 INJECTION, SOLUTION INTRAVENOUS at 09:50

## 2019-07-08 RX ADMIN — CEFAZOLIN SODIUM 2000 MG: 2 SOLUTION INTRAVENOUS at 20:59

## 2019-07-08 RX ADMIN — SODIUM CHLORIDE, SODIUM LACTATE, POTASSIUM CHLORIDE, AND CALCIUM CHLORIDE 125 ML/HR: .6; .31; .03; .02 INJECTION, SOLUTION INTRAVENOUS at 07:59

## 2019-07-08 RX ADMIN — ONDANSETRON 4 MG: 2 INJECTION INTRAMUSCULAR; INTRAVENOUS at 13:04

## 2019-07-08 RX ADMIN — FOLIC ACID 1 MG: 1 TABLET ORAL at 14:52

## 2019-07-08 RX ADMIN — DEXAMETHASONE SODIUM PHOSPHATE 4 MG: 4 INJECTION, SOLUTION INTRAMUSCULAR; INTRAVENOUS at 09:10

## 2019-07-08 RX ADMIN — FENTANYL CITRATE 50 MCG: 50 INJECTION, SOLUTION INTRAMUSCULAR; INTRAVENOUS at 08:50

## 2019-07-08 RX ADMIN — HEPARIN SODIUM 5000 UNITS: 5000 INJECTION INTRAVENOUS; SUBCUTANEOUS at 14:58

## 2019-07-08 RX ADMIN — FENTANYL CITRATE 50 MCG: 50 INJECTION, SOLUTION INTRAMUSCULAR; INTRAVENOUS at 08:54

## 2019-07-08 NOTE — OP NOTE
Orthopedics ARTHROPLASTY HIP TOTAL  Op Note    Fanta Nelson  7/8/2019      Pre-op Diagnosis:   Primary osteoarthritis of right hip    Post-op Diagnosis:  Same    Procedure:  ARTHROPLASTY RIGHT HIP- TOTAL    Surgeon:  Rubens Edmondson MD    Assistants:  Venus Bauer PA-C    I was present for the entire procedure and A qualified resident physician was not available  NOTE:  The presence of a physician assistant was necessary to help with patient positioning, surgical exposure, wound retraction, wound closure, and other key portions of the procedure  No qualified resident was available for this case  Anesthesia:  LMA    Estimated Blood Loss:  300 cc    Material sent to lab:  None      Complications:  None      Implants:  DePuy total hip system:  Acetabulum: 50  Poly: neutral  Femur: 6 standard  Head: 32 +1    Indications: This is a 69y o  -year-old Female Who presents with right hip pain unresponsive to nonoperative management  Treatment options were discussed with them  They wished to proceed with surgical treatment, a total hip arthroplasty  Risks and benefits were discussed which included but were not limited to infection, neurovascular damage, incomplete resolution of symptoms, hip dislocations, leg length discrepancies, failure of the implants, early failure, need for revision, DVT, PE, pneumonia, MI, and death  Informed consent was obtained  Procedure: The patient was met preoperatively and the right Hip was identified and signed  There were taken back to the operating room where a LMA* was performed  Patient was then positioned in the lateral position with bony prominences well-padded  The hip positioners were used  The patient's back was straight and the patient was not leaning forwards or backwards  Once I was pleased with the position of the patient and the patient was stable on the bed, the leg was sterilely prepped and draped in the usual fashion   A timeout was held identifying the patient, side, site, antibiotics, and allergies  They received Ancef preoperatively  The leg was marked and a curvilinear incision was made over the posterior lateral aspect of the hip  I dissected down through the soft tissue to the IT band, getting hemostasis along the way  IT band was then split down the middle, in line with its fibers  A Charnley retractor was then placed  At this point it was found that the abductors were no longer attached  The greater trochanter was bare  This is very chronic appearing  The short external rotators appeared primarily to be scar tissue as opposed to healthy tendon  The capsule was intact  The short external rotators were identified using a Palomo elevator  A Palomo was placed superior to the piriformis and then the tissue was elevated directly off the bone with Bovie cautery  Again hemostasis was obtained throughout the dissection  The short external rotators and capsule were tagged with Ethibond  There were then retracted posteriorly for visualization  Once I had good exposure of the femoral neck and head, the hip was dislocated  All remaining soft tissue around the head and neck were removed  A guide was used to dictate the angle of the femoral neck cut, and I made a femoral neck cut approximately 1 cm proximal to the lesser trochanter  The head was removed  I then placed retractors anterior to the acetabulum as well as inferior to the acetabulum  Soft tissue was debrided out of the acetabulum  The knee was removed  I had significant difficulty with exposure due to the amount of scarring and shortening  I sequentially worked on releasing the tight tissue  I then began sequentially reaming the acetabulum  I started with a 45 millimeter reamer, straight in order to medialize  Once I was pleased with how much I medialized, I then began increasing my reamer size sequentially  I reamed up to a size 49, and at that point felt that I had good fit anterior and posteriorly    I did not have any further wall to ream up on a I did not have as much coverage superiorly as I would have liked  Given that I felt like I could not go up any further from anterior to posterior to without violating the wall, I elected to leave this  I medialized further to give myself the best fixation  A trial was placed and I was pleased with the overall alignment and fit  I placed a 50 millimeter acetabular, cluster hole cup  This is impacted down with a mallet  I checked to make sure that the acetabulum was all the way down and stable  The acetabulum was stable anterior to posteriorly, but it did seem to walk significantly on a superior and inferior level  Three screws were placed within the acetabulum, all of which had excellent bite  Once the screws were in the acetabulum appeared well fixed with no instability  A neutral liner was placed  I then turned my attention to the femur  Femoral retractor was placed underneath the proximal femur and another retractor around the neck  I used the cookie cutter, then the canal finder, then the lateral rasp to open up the proximal canal  I then began sequentially broaching with the entry broach up to a size 6  At this point I felt that I had very good fit with no instability  I then trialed the head and neck, starting off with neutral and +1  I found the 32 mm head with the plus one neck and the standard offset gave good stability  With this I had stability in the sleep position to 75 degrees, with slightly shorter right leg lengths, and stability to 70 degrees of internal rotation at 90° of flexion  There was no shuck  The like was fairly tight in full extension and anteriorly  Increasing the length to improve the leg length cause too much anterior tightness  Due to the patient's lack of abductors, I was concerned about overall stability and elected not to do any anterior release      At this point the hip was then thoroughly irrigated out and trial implants were removed  Duramorph, 10 mLs, were placed in the anterior capsule  The final acetabular liner was placed  I then placed the final femoral stem, retrialed the head, and placed the final head once I was pleased with its stability  I checked one more time I stability  I thoroughly irrigated out the hip and surrounding tissue  The short external rotators were repaired through bone tunnels in the greater trochanter  The IT band was closed with interrupted Ethibond and 0 Vicryl  Deep subcutaneous was closed with 0 Vicryl  The skin was closed with 2-0 Vicryl and strata 6  Steri-Strips and a sterile dressing was placed  Patient was placed in a hip abduction pillow  Disposition:  Patient tolerated the procedure well and was taken to recovery postoperatively      Plan:  - Patient will be weightbearing as tolerated  - Patient will follow posterior hip precautions  - Patient will receive 24 hours of IV antibiotics postoperatively  - Patient will have sequential compressive devices placed bilaterally  - Heparin will be started on postoperative day 1 for a total of 6 weeks, due to her hypercoagulable state  - Patient will work with physical therapy and occupational therapy  - X-rays will be obtained at 2 weeks postoperatively      @[facility]@     Date: 7/8/2019  Time: 11:30 AM

## 2019-07-08 NOTE — INTERVAL H&P NOTE
H&P reviewed  After examining the patient there is noted to be a rash underneath the patient's pannus  This was localized  She felt this was likely due to an errant use her 's jock inch spray yesterday  There was no erythema around the surgical site  A discussion was held with the patient  It was felt appropriate to proceed with surgery

## 2019-07-08 NOTE — OCCUPATIONAL THERAPY NOTE
Occupational Therapy Cancellation Note      Patient Name: Katie BALBUENA'S Date: 7/8/2019     OT orders received and chart reviewed  Patient declined therapy eval due to nausea, dizziness and headache  RN aware  Will continue to follow & evaluate as appropriate      [place], OT

## 2019-07-08 NOTE — ANESTHESIA POSTPROCEDURE EVALUATION
Post-Op Assessment Note    CV Status:  Stable  Pain Score: 0    Pain management: adequate     Mental Status:  Awake   Hydration Status:  Stable   PONV Controlled:  None   Airway Patency:  Patent   Post Op Vitals Reviewed: Yes      Staff: Anesthesiologist, with CRNAs           BP      Temp      Pulse     Resp      SpO2

## 2019-07-08 NOTE — PHYSICAL THERAPY NOTE
PT eval/tx   07/08/19 1547   Note Type   Note type Eval/Treat   Pain Assessment   Pain Assessment 0-10   Pain Score 4   Pain Location Hip   Pain Orientation Right   Home Living   Type of Home House   Home Layout Two level   Bathroom Shower/Tub Walk-in shower   Bathroom Toilet Raised   Bathroom Equipment Commode   Bathroom Accessibility Accessible   Home Equipment Walker;Cane   Prior Function   Level of [place] Needs assistance with IADLs; Independent with ADLs and functional mobility   Lives With Alone   Receives Help From Family   ADL Assistance Independent   IADLs Needs assistance   Falls in the last 6 months 0   Vocational Retired   Comments uses adaptive equipment for ADLs prior to [employer], rw and cane   Restrictions/Precautions   Wells Pierz Bearing Precautions Per Order Yes   RLE Weight Bearing Per Order WBAT   General   Additional Pertinent History PT with R hip osteo arthritis as well as B knees Underwent R THR   Family/Caregiver Present No   Cognition   Overall Cognitive Status WFL   Arousal/Participation Alert   Orientation Level Oriented X4   Memory Within functional limits   Following Commands Follows all commands and directions without difficulty   Comments anxious and needs extra time to complete tasks   RUE Assessment   RUE Assessment WFL   LUE Assessment   LUE Assessment WFL   RLE Assessment   RLE Assessment   (hip flexion to 90 other wfl strength 3-/5)   LLE Assessment   LLE Assessment WFL  (some knee pain)   Bed Mobility   Rolling R 4  Minimal assistance   Additional items Assist x 1;HOB elevated; Bedrails;Verbal cues;LE management   Supine to Sit 3  Moderate assistance   Additional items Assist x 1; Increased time required;LE management;Verbal cues;HOB elevated; Bedrails   Sit to Supine 4  Minimal assistance   Additional items Assist x 1;LE management;Verbal cues; Bedrails   Transfers   Sit to Stand 3  Moderate assistance   Additional items Assist x 2; Increased time required;Verbal cues;Armrests;HOB elevated; Bedrails   Stand to Sit 4  Minimal assistance   Additional items Assist x 2;Verbal cues; Increased time required;Armrests   Stand pivot 3  Moderate assistance   Additional items Assist x 2; Increased time required;Verbal cues;Armrests  (rw)   Toilet transfer 3  Moderate assistance   Additional items Commode;Assist x 2;Armrests   Ambulation/Elevation   Gait pattern Foward flexed; Short stride;Decreased foot clearance; Improper Weight shift; Step to;Excessively slow; Shuffling   Gait Assistance 3  Moderate assist   Additional items Assist x 2   Assistive Device Rolling walker   Distance 3'x2   Balance   Static Sitting Good   Dynamic Sitting Fair   Static Standing Fair -   Dynamic Standing Fair -   Ambulatory Fair -   Endurance Deficit   Endurance Deficit Yes   Endurance Deficit Description tires very quickly some dizziness afterward, low bp, nurse and MD aware 02 sat 100% on room air   Activity Tolerance   Activity Tolerance Patient limited by fatigue;Patient limited by pain   Medical Staff Made Aware yes   Nurse Made Aware yes   Assessment   Prognosis Good   Problem List Decreased strength;Decreased endurance; Impaired balance;Decreased mobility; Decreased coordination;Orthopedic restrictions   Assessment PT able to mobilize oob to commode and back to bed with 2 assist and rw  Appears pale andlow bp afterward but 02 sats 100% on room air  Repositioned  abduction pilow adn foot pumps  Needs in reach  reviewed THR precautions and pt told that some of her adaptive practices may need to change  She is willing to problemsolve together to [place] safest most independent level  WIll follow see goals  May need shor tterm in-pt rehab atd/c depending on progress     Barriers to Discharge   (medical status)   Goals   Patient Goals get better   STG Expiration Date 07/18/19   Short Term Goal #1 1) safe ind trnasfers 2) safe abm with rw wbat Rle 25-50' level 3) imrpove strength 1/2 grade Rle 4) improve balance to fair+   Plan   Treatment/Interventions ADL retraining;Functional transfer training;LE strengthening/ROM; Elevations; Therapeutic exercise; Endurance training;Patient/family training;Equipment eval/education; Bed mobility;Gait training;Spoke to MD;Spoke to nursing;OT   PT Frequency 5x/wk; Twice a day   Recommendation   Recommendation Short-term skilled PT; Home with family support;Home PT  (depending on progress)   Equipment Recommended Walker   PT - OK to Discharge No   Barthel Index   Feeding 10   Bathing 0   Grooming Score 0   Dressing Score 5   Bladder Score 10   Bowels Score 10   Toilet Use Score 5   Transfers (Bed/Chair) Score 5   Mobility (Level Surface) Score 0   Stairs Score 0   Barthel Index Score 45   Yanni Glynn, PT

## 2019-07-08 NOTE — PLAN OF CARE
Problem: Prexisting or High Potential for Compromised Skin Integrity  Goal: Skin integrity is maintained or improved  Description  INTERVENTIONS:  - Identify patients at risk for skin breakdown  - Assess and monitor skin integrity  - Assess and monitor nutrition and hydration status  - Monitor labs (i e  albumin)  - Assess for incontinence   - Turn and reposition patient  - Assist with mobility/ambulation  - Relieve pressure over bony prominences  - Avoid friction and shearing  - Provide appropriate hygiene as needed including keeping skin clean and dry  - Evaluate need for skin moisturizer/barrier cream  - Collaborate with interdisciplinary team (i e  Nutrition, Rehabilitation, etc )   - Patient/family teaching  7/8/2019 1907 by Mehul Walker, RN  Outcome: Progressing  7/8/2019 1906 by Mehul Walker, RN  Outcome: Progressing

## 2019-07-08 NOTE — PHYSICAL THERAPY NOTE
Physical Therapy Cancellation Note    Patient declined therapy eval due to nausea, dizziness and headache  RN aware  Will continue to follow  Collene Goodpasture Sterling, PT

## 2019-07-08 NOTE — CONSULTS
Inpatient Medical Consultation - [facility]    Patient Information: Bertram Jimenez 69 y o  female MRN: [id number]  Unit/Bed#: [address] [place] [address] Encounter: [id number]  PCP: Dominguez Keita MD  Date of Admission:  7/8/2019  Date of Consultation: 07/08/19  Requesting Physician: Meaghan Waller MD    Reason For Consultation:   Medical management    Assessment/Plan:    · Postop hypertension:  Hold antihypertensives lisinopril and indapamide  IV fluid resuscitation  Monitor urine output  Check I&Os and bladder scan  Watch for acute blood loss anemia  · Chronic kidney disease stage 3:  Creatinine 1 28 on 06/21/2019  Check labs a samson  Colton Talya Continue IV hydration  Avoid nephrotoxins  · Hypertension:  Currently blood pressure low at 88/46  Holding lisinopril and indapamide  · Prolonged PTT:  Being followed by Hematology  · Primary osteoarthritis of right hip status post total arthroplasty:  Orthopedic follow-up  Pain control  PT and OT    VTE Prophylaxis: Heparin and Enoxaparin (Lovenox)  / sequential compression device     Recommendations for Discharge:  · Resume home medications    Counseling / Coordination of Care Time: 1 hour  Greater than 50% of total time spent on patient counseling and coordination of care  Collaboration of Care: Were Recommendations Directly Discussed with Primary Treatment Team? - No     History of Present Illness:    Bertram Jimenez is a 69 y o  female who is originally admitted to the orthopedics service on 7/8/2019 due to right hip total arthroplasty  We are consulted for medical management  Patient has history of hypertension and chronic kidney disease  She has been on lisinopril and indapamide  Her last creatinine noted to be 1 28  Postop she was noted to be hypotensive  Her blood pressure medications were held  She is presently complaining of headache otherwise denies any shortness of breath or chest pain      Review of Systems:    Review of Systems  Twelve point review systems negative except noted above  Past Medical and Surgical History:     Past Medical History:   Diagnosis Date    Anxiety     Asymptomatic gallstones     Chronic pain     Depression 10/22/2015    Hyperlipidemia     Hypertension     Primary localized osteoarthritis of right knee 2/6/2018    Psychiatric disorder     Spinal stenosis     Stage III chronic kidney disease ([facility] [address] ) 4/9/2019       Past Surgical History:   Procedure Laterality Date    APPENDECTOMY      COLONOSCOPY      [place] COLONOSCOPY FLX DX W/COLLJ SPEC WHEN PFRMD N/A 3/13/2019    Procedure: COLONOSCOPY with biopsy;  Surgeon: Yasmine Llamas MD;  Location:  MAIN OR;  Service: Gastroenterology    [place] ESOPHAGOGASTRODUODENOSCOPY TRANSORAL DIAGNOSTIC N/A 3/27/2019    Procedure: ESOPHAGOGASTRODUODENOSCOPY (EGD)  with pediatric colonoscope with clotest and jejunum/duodenum bx r/o celiac;  Surgeon: Maxim Valerio MD;  Location:  MAIN OR;  Service: Gastroenterology    TOE SURGERY         Meds/Allergies:    all medications and allergies reviewed    Allergies:    Allergies   Allergen Reactions    Copper Rash    Meperidine Nausea Only     Other reaction(s): Nausea    Pt reports she is ok with Demerol now    Nickel Rash       Social History:     Marital Status: /Civil Union    Substance Use History:   Social History     Substance and Sexual Activity   Alcohol Use No     Social History     Tobacco Use   Smoking Status Never Smoker   Smokeless Tobacco Never Used     Social History     Substance and Sexual Activity   Drug Use No       Family History:    non-contributory    Physical Exam:     Vitals:   Blood Pressure: (!) 105/46 (07/08/19 1507)  Pulse: 90 (07/08/19 1507)  Temperature: (!) 97 4 °F (36 3 °C) (07/08/19 1507)  Temp Source: Axillary (07/08/19 1507)  Respirations: 16 (07/08/19 1507)  Height: 5' 4" (162 6 cm) (07/08/19 0752)  Weight - Scale: 91 1 kg (200 lb 12 8 oz) (07/08/19 0752)  SpO2: 100 % (07/08/19 1507)    Physical Exam Gen -Patient comfortable at rest  Neck- Supple  No thyromegaly or lymphadenopathy  Lungs-Clear bilaterally without any wheeze or rales   Heart S1-S2, regular rate and rhythm, no murmurs  Abdomen-soft nontender, no organomegaly  Bowel sounds present  Extremities-no cyanosi,  clubbing or edema  Skin- no rash  Neuro-nonfocal         Additional Data:     Lab Results: I have personally reviewed pertinent reports  Invalid input(s): LABALBU        Imaging: I have personally reviewed pertinent reports  Xr Pelvis Ap Only 1 Or 2 Vw    Result Date: 7/8/2019  Narrative: PELVIS INDICATION:   s/p right TAMAR  Please include entire femoral stem  In PACU  Thank you    COMPARISON:  3/28/2019 VIEWS:  XR PELVIS AP ONLY 1 OR 2 VW FINDINGS: No fracture or pathologic bone lesions  Interval right hip prosthesis noted  Orthopedic hardware well seated within bone  No lytic or blastic lesions are seen  Clips in the right hemipelvis again noted  Degenerative changes pubic symphysis and visualized lower lumbar spine  Impression: Interval right hip replacement, orthopedic hardware well seated within bone  Workstation performed: [id number]       EKG, Pathology, and Other Studies Reviewed on Admission:   · EKG:  EKG from 6 May 2019 reviewed, shows normal sinus rhythm    ** Please Note: This note has been constructed using a voice recognition system   **

## 2019-07-08 NOTE — ANESTHESIA PREPROCEDURE EVALUATION
Review of Systems/Medical History  Patient summary reviewed  Chart reviewed      Cardiovascular  Hyperlipidemia, Hypertension controlled,    Pulmonary  Negative pulmonary ROS        GI/Hepatic  Negative GI/hepatic ROS          Chronic kidney disease stage 3,        Endo/Other  Blood dyscrasia,      GYN  Negative gynecology ROS          Hematology  Anemia iron deficiency anemia,  Hypercoagulable state,    Musculoskeletal  Back pain , spinal stenosis, Osteoarthritis,   Arthritis     Neurology  Negative neurology ROS      Psychology   Anxiety, Depression , depressed and being treated for depression,              Physical Exam    Airway    Mallampati score: I  TM Distance: >3 FB  Neck ROM: full     Dental   upper dentures and lower dentures,     Cardiovascular  Cardiovascular exam normal    Pulmonary  Pulmonary exam normal     Other Findings        Anesthesia Plan  ASA Score- 3     Anesthesia Type- general with ASA Monitors  Additional Monitors:   Airway Plan: LMA  Plan Factors-    Induction- intravenous  Postoperative Plan- Plan for postoperative opioid use  Informed Consent- Anesthetic plan and risks discussed with patient  I personally reviewed this patient with the CRNA  Discussed and agreed on the Anesthesia Plan with the CRNA  Luzma Del Castillo

## 2019-07-08 NOTE — PROGRESS NOTES
Patient is dizzy and has a headache  BP is low  As per protocol 1 L of NS given IV bolus  Elective Orthopedic Total Joint Hypotension Protocol:     Step 1:             For any SBP < 90 mmHg:             Repeat BP in opposite arm to confirm manually, If SBP remains < 90mmHg page Orthopedics STAT or escalate             For SBP < 100 mmHg:             Repeat BP in opposite arm to confirm manually  If SBP <100, proceed to Step 2     Step2:           Give 1000ml's of selected fluid over 1 hour based on the following criteria:            K level is < 4 8, give 1000L LR bolus                               OR             K level > 4 8, give 1000L NS bolus        Recheck BP one hour after completed, If SBP remain < 100, notify the provider and proceed to Step 3       Step 3:        Administer second [id number]'s of selected fluid over 1 hour   Recheck BP one hour after completed, if SBP remains < 100 mmHg,        notify provider and discontinue Protocol as MD cosme

## 2019-07-08 NOTE — DISCHARGE INSTRUCTIONS
1  WBAT to RLE with assistance  2  Continue PT/OT  Maintain posterior hip precautions  3  Pain medication as needed  4  Continue DVT prophylaxis as prescribed  Heparin SQ for total of 4 weeks  5  Dressing change in one week then daily dressing change with gauze and tape  Do not shower until seen by surgeon in office  6  Follow up in office in 10-14 days

## 2019-07-09 PROBLEM — D64.9 ANEMIA: Status: ACTIVE | Noted: 2019-07-09

## 2019-07-09 LAB
ABO GROUP BLD BPU: NORMAL
ANION GAP SERPL CALCULATED.3IONS-SCNC: 11 MMOL/L (ref 4–13)
BPU ID: NORMAL
BUN SERPL-MCNC: 26 MG/DL (ref 5–25)
CALCIUM SERPL-MCNC: 7.9 MG/DL (ref 8.3–10.1)
CHLORIDE SERPL-SCNC: 104 MMOL/L (ref 100–108)
CO2 SERPL-SCNC: 20 MMOL/L (ref 21–32)
CREAT SERPL-MCNC: 1.66 MG/DL (ref 0.6–1.3)
CROSSMATCH: NORMAL
ERYTHROCYTE [DISTWIDTH] IN BLOOD BY AUTOMATED COUNT: 13.5 % (ref 11.6–15.1)
GFR SERPL CREATININE-BSD FRML MDRD: 30 ML/MIN/1.73SQ M
GLUCOSE SERPL-MCNC: 127 MG/DL (ref 65–140)
HCT VFR BLD AUTO: 24.5 % (ref 34.8–46.1)
HCT VFR BLD AUTO: 27.8 % (ref 34.8–46.1)
HGB BLD-MCNC: 7.9 G/DL (ref 11.5–15.4)
HGB BLD-MCNC: 9 G/DL (ref 11.5–15.4)
MCH RBC QN AUTO: 31.1 PG (ref 26.8–34.3)
MCHC RBC AUTO-ENTMCNC: 32.2 G/DL (ref 31.4–37.4)
MCV RBC AUTO: 97 FL (ref 82–98)
PLATELET # BLD AUTO: 159 THOUSANDS/UL (ref 149–390)
PMV BLD AUTO: 10.4 FL (ref 8.9–12.7)
POTASSIUM SERPL-SCNC: 4.6 MMOL/L (ref 3.5–5.3)
RBC # BLD AUTO: 2.54 MILLION/UL (ref 3.81–5.12)
SODIUM SERPL-SCNC: 135 MMOL/L (ref 136–145)
UNIT DISPENSE STATUS: NORMAL
UNIT PRODUCT CODE: NORMAL
UNIT RH: NORMAL
WBC # BLD AUTO: 7.9 THOUSAND/UL (ref 4.31–10.16)

## 2019-07-09 PROCEDURE — 85014 HEMATOCRIT: CPT | Performed by: PHYSICIAN ASSISTANT

## 2019-07-09 PROCEDURE — 97535 SELF CARE MNGMENT TRAINING: CPT

## 2019-07-09 PROCEDURE — 97116 GAIT TRAINING THERAPY: CPT

## 2019-07-09 PROCEDURE — 85027 COMPLETE CBC AUTOMATED: CPT | Performed by: PHYSICIAN ASSISTANT

## 2019-07-09 PROCEDURE — 80048 BASIC METABOLIC PNL TOTAL CA: CPT | Performed by: PHYSICIAN ASSISTANT

## 2019-07-09 PROCEDURE — G8988 SELF CARE GOAL STATUS: HCPCS

## 2019-07-09 PROCEDURE — 97530 THERAPEUTIC ACTIVITIES: CPT

## 2019-07-09 PROCEDURE — 99232 SBSQ HOSP IP/OBS MODERATE 35: CPT | Performed by: FAMILY MEDICINE

## 2019-07-09 PROCEDURE — 99024 POSTOP FOLLOW-UP VISIT: CPT | Performed by: ORTHOPAEDIC SURGERY

## 2019-07-09 PROCEDURE — 97167 OT EVAL HIGH COMPLEX 60 MIN: CPT

## 2019-07-09 PROCEDURE — 97110 THERAPEUTIC EXERCISES: CPT

## 2019-07-09 PROCEDURE — 85018 HEMOGLOBIN: CPT | Performed by: PHYSICIAN ASSISTANT

## 2019-07-09 PROCEDURE — P9016 RBC LEUKOCYTES REDUCED: HCPCS

## 2019-07-09 PROCEDURE — G8987 SELF CARE CURRENT STATUS: HCPCS

## 2019-07-09 RX ADMIN — GABAPENTIN 100 MG: 100 CAPSULE ORAL at 05:34

## 2019-07-09 RX ADMIN — HEPARIN SODIUM 5000 UNITS: 5000 INJECTION INTRAVENOUS; SUBCUTANEOUS at 14:39

## 2019-07-09 RX ADMIN — DOCUSATE SODIUM 100 MG: 100 CAPSULE, LIQUID FILLED ORAL at 08:39

## 2019-07-09 RX ADMIN — HEPARIN SODIUM 5000 UNITS: 5000 INJECTION INTRAVENOUS; SUBCUTANEOUS at 05:36

## 2019-07-09 RX ADMIN — FERROUS SULFATE TAB 325 MG (65 MG ELEMENTAL FE) 325 MG: 325 (65 FE) TAB at 08:39

## 2019-07-09 RX ADMIN — FOLIC ACID 1 MG: 1 TABLET ORAL at 08:40

## 2019-07-09 RX ADMIN — HEPARIN SODIUM 5000 UNITS: 5000 INJECTION INTRAVENOUS; SUBCUTANEOUS at 22:16

## 2019-07-09 RX ADMIN — GABAPENTIN 100 MG: 100 CAPSULE ORAL at 22:16

## 2019-07-09 RX ADMIN — OXYCODONE HYDROCHLORIDE AND ACETAMINOPHEN 500 MG: 500 TABLET ORAL at 08:39

## 2019-07-09 RX ADMIN — FERROUS SULFATE TAB 325 MG (65 MG ELEMENTAL FE) 325 MG: 325 (65 FE) TAB at 18:25

## 2019-07-09 RX ADMIN — NYSTATIN: 100000 POWDER TOPICAL at 08:43

## 2019-07-09 RX ADMIN — OXYCODONE HYDROCHLORIDE AND ACETAMINOPHEN 500 MG: 500 TABLET ORAL at 18:24

## 2019-07-09 RX ADMIN — NYSTATIN: 100000 POWDER TOPICAL at 18:25

## 2019-07-09 RX ADMIN — PANTOPRAZOLE SODIUM 40 MG: 40 TABLET, DELAYED RELEASE ORAL at 08:39

## 2019-07-09 RX ADMIN — GABAPENTIN 100 MG: 100 CAPSULE ORAL at 14:39

## 2019-07-09 RX ADMIN — ACETAMINOPHEN 650 MG: 325 TABLET, FILM COATED ORAL at 22:17

## 2019-07-09 RX ADMIN — CITALOPRAM HYDROBROMIDE 20 MG: 20 TABLET ORAL at 08:39

## 2019-07-09 NOTE — PLAN OF CARE
Problem: OCCUPATIONAL THERAPY ADULT  Goal: Performs self-care activities at highest level of function for planned discharge setting  See evaluation for individualized goals  Outcome: Progressing  Note:   Limitation: Decreased ADL status, Decreased Safe judgement during ADL, Decreased cognition, Decreased endurance, Decreased self-care trans, Decreased high-level ADLs  Prognosis: Fair  Assessment: Pt is a 69 y o  female seen for OT evaluation at [facility], admitted 7/8/2019 w/ Primary osteoarthritis of right hip  OT completed extensive review of pt's medical and social history  Comorbidities affecting pt's functional performance at time of assessment include: OA B/L knees, lumbar spondylosis, HTN, palpitations, depression, anxiety, anemia  Personal factors affecting pt at time of IE include:limited home support, difficulty performing ADLS and difficulty performing IADLS   Prior to admission, pt was living in [address] [place] [address] alone but has family nearby who helps with IADLs  Pt was modified independent with ADLs, and is able to prepare light meals  Upon evaluation, pt presents to OT below baseline due to the following performance deficits: weakness, decreased strength, decreased balance, decreased tolerance, impaired memory, increased pain and orthopedic restrictions  Pt to benefit from continued skilled OT tx while in the hospital to address deficits as defined above and maximize level of functional independence w ADL's and functional mobility  Occupational Performance areas to address include: bathing/shower, toilet hygiene, dressing, functional mobility and household maintenance  Based on findings, pt is of high complexity  At this time, OT recommendations at time of discharge are short term rehab       OT Discharge Recommendation: Short Term Rehab  OT - OK to Discharge: Yes(to STR when medically stable)

## 2019-07-09 NOTE — OCCUPATIONAL THERAPY NOTE
Occupational Therapy Evaluation & Treatment  OT Eval 9:20-9:35/ OT Tx 13:20-13:55    Fanta Laguerre    7/9/2019    Patient Active Problem List   Diagnosis    Primary localized osteoarthritis of right knee    Primary localized osteoarthritis of left knee    Lumbar spondylosis    Osteoarthritis of both knees    Leg pain, bilateral    Rectal prolapse    Essential hypertension    Heart palpitations    Diarrhea    Depression    Anxiety    Anemia, iron deficiency, inadequate dietary intake    Right lower quadrant abdominal pain    Abnormal CT scan    Primary osteoarthritis of right hip    Stage III chronic kidney disease (HCC)    Prolonged PTT (partial thromboplastin time)    Pre-op examination    Primary osteoarthritis of one hip, right       Past Medical History:   Diagnosis Date    Anxiety     Asymptomatic gallstones     Chronic pain     Depression 10/22/2015    Hyperlipidemia     Hypertension     Primary localized osteoarthritis of right knee 2/6/2018    Psychiatric disorder     Spinal stenosis     Stage III chronic kidney disease ([facility] [address] ) 4/9/2019       Past Surgical History:   Procedure Laterality Date    APPENDECTOMY      COLONOSCOPY      [place] COLONOSCOPY FLX DX W/COLLJ SPEC WHEN PFRMD N/A 3/13/2019    Procedure: COLONOSCOPY with biopsy;  Surgeon: Scott Lim MD;  [facility];  Service: Gastroenterology    [place] ESOPHAGOGASTRODUODENOSCOPY TRANSORAL DIAGNOSTIC N/A 3/27/2019    Procedure: ESOPHAGOGASTRODUODENOSCOPY (EGD)  with pediatric colonoscope with clotest and jejunum/duodenum bx r/o celiac;  Surgeon: Jaclyn Travis MD;  [facility];  Service: Gastroenterology    MO TOTAL HIP ARTHROPLASTY Right 7/8/2019    Procedure: ARTHROPLASTY HIP TOTAL;  Surgeon: Linden Wilcox MD;  [facility];  Service: Orthopedics    TOE SURGERY          07/09/19 2413   Note Type   Note type Eval/Treat   Restrictions/Precautions   Weight Bearing Precautions Per Order Yes   RLE Weight Bearing Per Order WBAT   Other Precautions THR; Fall Risk   Pain Assessment   Pain Assessment 0-10   Pain Score 3   Pain Type Acute pain;Surgical pain   Pain Location Leg   Pain Orientation Right   Home Living   Type of [address] Two level;Ramped entrance; Able to live on main level with bedroom/bathroom   Bathroom Shower/Tub Walk-in shower   Bathroom Toilet Raised   [address];Cane;Reacher;Stair glide   Prior Function   Level of [place] Needs assistance with IADLs; Independent with ADLs and functional mobility   Lives With Alone   Receives Help From Family   ADL Assistance Independent   IADLs Needs assistance   Falls in the last 6 months 0   Vocational Retired   Psychosocial   Psychosocial (WDL) WDL   Patient Behaviors/Mood Anxious   ADL   Eating Assistance 7  Independent   Grooming Assistance 6  Modified Independent   UB Bathing Assistance 5  Supervision/Setup   LB Bathing Assistance 3  Moderate Assistance   UB Dressing Assistance 5  Supervision/Setup   LB Dressing Assistance 3  Moderate Assistance   LB Dressing Deficit   (Refusing AE other than reacher)   [address]  4  Minimal Assistance   Bed Mobility   Sit to Supine 4  Minimal assistance   Transfers   Sit to Stand 3  Moderate assistance   Additional items Assist x 2   Stand to Sit 3  Moderate assistance   Additional items Assist x 2   Stand pivot 3  Moderate assistance   Additional items Assist x 2   Toilet transfer 3  Moderate assistance   Additional items Assist x 2;Commode   Activity Tolerance   Activity Tolerance Patient tolerated treatment well   Nurse Made Aware RNSiobhan, cleared pt for eval/tx   RUE Assessment   RUE Assessment WFL   LUE Assessment   LUE Assessment WFL   Hand Function   Gross Motor Coordination Functional   Fine Motor Coordination Functional   Cognition   Overall Cognitive Status Impaired   Arousal/Participation Alert; Cooperative   Attention Attends with cues to redirect   Orientation Level Oriented X4   Memory Decreased recall of recent events;Decreased recall of precautions   Following Commands Follows one step commands without difficulty   Comments Pt presents with some confusion at end of session  Assessment   Limitation Decreased ADL status; Decreased Safe judgement during ADL;Decreased cognition;Decreased endurance;Decreased self-care trans;Decreased high-level ADLs   Prognosis Fair   Assessment Pt is a 69 y o  female seen for OT evaluation at [facility], admitted 7/8/2019 w/ Primary osteoarthritis of right hip  OT completed extensive review of pt's medical and social history  Comorbidities affecting pt's functional performance at time of assessment include: OA B/L knees, lumbar spondylosis, HTN, palpitations, depression, anxiety, anemia  Personal factors affecting pt at time of IE include:limited home support, difficulty performing ADLS and difficulty performing IADLS   Prior to admission, pt was living in [facility] alone but has family nearby who helps with IADLs  Pt was modified independent with ADLs, and is able to prepare light meals  Upon evaluation, pt presents to OT below baseline due to the following performance deficits: weakness, decreased strength, decreased balance, decreased tolerance, impaired memory, increased pain and orthopedic restrictions  Pt to benefit from continued skilled OT tx while in the hospital to address deficits as defined above and maximize level of functional independence w ADL's and functional mobility  Occupational Performance areas to address include: bathing/shower, toilet hygiene, dressing, functional mobility and household maintenance  Based on findings, pt is of high complexity  At this time, OT recommendations at time of discharge are short term rehab  Plan   Treatment Interventions ADL retraining;Functional transfer training; Endurance training;Cognitive reorientation;Patient/family training; Compensatory technique education; Energy conservation; Activityengagement   Goal Expiration Date 07/16/19   OT Frequency 2-3x/wk   Additional Treatment Session   Start Time 1320   End Time 5165   Treatment Assessment Patient participated in Skilled OT session this date with interventions consisting of ADL re training with the use of correct body mechnaics, safety awareness and fall prevention techniques, maintaining Total Hip precautions,  therapeutic activities to: increase activity tolerance and increase dynamic sit/ stand balance during functional activity    Patient agreeable to OT treatment session, upon arrival patient was found seated OOB to Recliner  Treatment session as follows: Pt states need to use commode  Pt used reacher to doff L sock with difficulty  Pt refuses trial of other adaptive equipment  Pt attempts to don L shoe with difficulty and needed Mod A  Pt again declined use of AE and requested OT don R shoe  Pt required Mod A x2 to stand and pivot to commode with RW  Pt utilized AE for toilet hygiene  Pt ambulated with RW in room to other side of bed, with 2 LOB noted which required therapist to correct  Pt required Min A for LE mgmt for sit to supine  Pt able to state 1/3 THPs  Pt provided with handout reviewing THPs and technique for sit/stand with THPs  Patient continues to be functioning below baseline level, occupational performance remains limited secondary to factors listed above and increased risk for falls and injury  From OT standpoint, recommendation at time of d/c would be Short Term Rehab  Pt is agreeable  Patient to benefit from continued Occupational Therapy treatment while in the hospital to address deficits as defined above and maximize level of functional independence with ADLs and functional mobility  Pt left with call bell in reach, tray table in reach, needs met     Recommendation   OT Discharge Recommendation Short Term Rehab   OT - OK to Discharge Yes  (to STR when medically stable)   Barthel Index   Feeding 10 Bathing 0   Grooming Score 5   Dressing Score 5   Bladder Score 10   Bowels Score 10   Toilet Use Score 5   Transfers (Bed/Chair) Score 5   Mobility (Level Surface) Score 0   Stairs Score 0   Barthel Index Score 50     Pt will achieve the following goals within 7-10 days  *Pt will complete LB bathing and dressing with Mod I      *Pt will complete toileting (hygiene and clothing management) with Mod I     *Pt will complete bed mobility with Mod I, with bed flat and no side rail to prep for purposeful tasks    *Pt will perform functional transfers with Mod I in order to complete ADL routine  *Pt will complete item retrieval and light home management with Mod I while demonstrating good safety  *Pt will demonstrate increased activity tolerance in order to complete ADL routine  *Pt will participate in cognitive assessment to determine level of safety for returning home    *Pt will participate in UE therapeutic exercise in order to maximize strength for ADL transfers  *Pt will identify 3/3 THPs to ensure safety upon discharge      Naehas, OT

## 2019-07-09 NOTE — SOCIAL WORK
LOS:1, Patient is not a Medicare Bundled or 30 day readmission patient  Met with patient to discuss the role of Care Management  Patient resides alone in a 2 story home with a ramp to enter  She has a 1st floor set up and a stair lift to get to the 2nd floor if needed  Patient is independent of ADL's and recently reitred [facility] and collects [address]  She has a RW and SPC  She reports being independent of ADL's  Her daughter Lauryn Delgado visits daily to help and she identifies Lauryn Delgado as her caregiver  She reports having an Advanced Directive/POA and that it is in medical records  She uses [facility] for her meds  She reports no BHU, stay, VNA services or drug and alcohol or SNF rehab stays  Discussed her need for short term SNF rehab nd she is agreeable  List and Freedom of Choice given and she is requesting referrals to [address] [place] [address] be made  Referrals sent to both SNF's  Wit bed availability

## 2019-07-09 NOTE — PLAN OF CARE
Problem: Prexisting or High Potential for Compromised Skin Integrity  Goal: Skin integrity is maintained or improved  Description  INTERVENTIONS:  - Identify patients at risk for skin breakdown  - Assess and monitor skin integrity  - Assess and monitor nutrition and hydration status  - Monitor labs (i e  albumin)  - Assess for incontinence   - Turn and reposition patient  - Assist with mobility/ambulation  - Relieve pressure over bony prominences  - Avoid friction and shearing  - Provide appropriate hygiene as needed including keeping skin clean and dry  - Evaluate need for skin moisturizer/barrier cream  - Collaborate with interdisciplinary team (i e  Nutrition, Rehabilitation, etc )   - Patient/family teaching  Outcome: Progressing     Problem: Potential for Falls  Goal: Patient will remain free of falls  Description  INTERVENTIONS:  - Assess patient frequently for physical needs  -  Identify cognitive and physical deficits and behaviors that affect risk of falls    -  [place] fall precautions as indicated by assessment   - Educate patient/family on patient safety including physical limitations  - Instruct patient to call for assistance with activity based on assessment  - Modify environment to reduce risk of injury  - Consider OT/PT consult to assist with strengthening/mobility  Outcome: Progressing

## 2019-07-09 NOTE — PHYSICAL THERAPY NOTE
PT tx     07/09/19 1356   Pain Assessment   Pain Score 3   Pain Type Acute pain  (muscle spasm r thigh)   Pain Location Leg   Pain Orientation Right   Restrictions/Precautions   Weight Bearing Precautions Per Order Yes   RLE Weight Bearing Per Order WBAT   General   Chart Reviewed Yes   Additional Pertinent History rec'd blood this am   Cognition   Overall Cognitive Status [facility]   Arousal/Participation Alert   Attention Attends with cues to redirect   Orientation Level Oriented to person;Oriented to place   Memory Decreased short term memory   Following Commands Follows one step commands with increased time or repetition   Subjective   Subjective feels better after blood transfusion   Bed Mobility   Sit to Supine 3  Moderate assistance   Additional items Assist x 2; Increased time required;LE management;Verbal cues   Transfers   Sit to Stand 3  Moderate assistance   Additional items Assist x 2   Stand to Sit 4  Minimal assistance   Additional items Assist x 1;Verbal cues; Increased time required   Stand pivot 3  Moderate assistance   Additional items Assist x 1  (rw)   Ambulation/Elevation   Gait pattern Wide AVINASH; Forward Flexion; Inconsistent dalton; Foward flexed; Short stride; Improper Weight shift  (LOB x2 with assist to correct)   Gait Assistance 3  Moderate assist   Additional items Assist x 1   Assistive Device Rolling walker   Distance 15',3'   Balance   Static Sitting Fair   Dynamic Sitting Fair -   Static Standing Fair -   Dynamic Standing Fair -   Ambulatory Fair -   Endurance Deficit   Endurance Deficit Yes   Endurance Deficit Description tires quickly   Activity Tolerance   Activity Tolerance Patient limited by fatigue   Nurse Made Aware yes 02 sat on room air98%   Exercises   THR Supine;10 reps;AAROM; Right   Assessment   Prognosis Good   Problem List Decreased strength;Decreased endurance; Impaired balance;Decreased mobility;Orthopedic restrictions;Decreased safety awareness   Assessment Pt able to transfer, gait train  for short distance and perform THR ther ex with assist  Some R thigh pain/spasm but resolved by end of session  Agreeable to barak tterm in-pt rehab atd/c  Near LOB x 2 and difficulty arising fo chair  chair replaced with higher seat model  Needs in reach  Con't  as jordy   Barriers to Discharge   (medical status)   Goals   Patient Goals get better   Plan   Treatment/Interventions ADL retraining;Functional transfer training;LE strengthening/ROM; Elevations; Therapeutic exercise; Endurance training;Patient/family training;Equipment eval/education; Bed mobility;Gait training;Spoke to nursing;Spoke to case management;OT   Progress Improving as expected   PT Frequency 5x/wk; Twice a day  (cancel this AM due to blood trnasfusion)   Recommendation   Recommendation Short-term skilled PT   Equipment Recommended Walker   PT - OK to Discharge Yes   Additional Comments   (with medical clearance)   Aguilar Reid, PT

## 2019-07-09 NOTE — TELEPHONE ENCOUNTER
Preoperative Elective Admission Assessment       Living Situation: Pt reports living at home, Alone  Home Layout:Multi level home with walk in shower, grab bars and shower seat available                      Steps:ramp to enter the home and chair lift for stairs    First Floor Setup: With accomodations to the home  Post-op Caregiver: Daughter, Isabell Padron is a home health care worker, but pt was not clear if Isabell Padron will be caring for her  Pt requesting snf/rehab stay    Post-op Transport: N/A     Outpatient Physical Therapy Site:Pt refused to discuss    DME: Pt reports having a cane, RW and denies [facility]     Patient's Current Level of Function: Cane and walker, assisted with ADLS from daughter    Medication Management: Pt reports self managing medications removing them daily from the bottle                      Preferred Pharmacy: [facility]                    Blood Management Vitamins: Pt reports taking folic acid, vitamin C and the iron daily                  Post-op anticoagulant:TBD by surgeon    DC Plan: Pt refused to discuss  Pt reported she had to go and was unable to discuss  Pt reports needing SNF/REHAB just not "[facility]" where a family member is                      Barriers to DC identified preoperatively:   *Post op planning  BMI: 35 09    [employer]:  Denied Enrollment                      RAPT:3                     ACE/ARB Form:                    HOOS/KOOS: Pt would not answer questions    Patient Education: Pt reportedly had to go and would not allow assessment to finish

## 2019-07-09 NOTE — ASSESSMENT & PLAN NOTE
· Status post right total hip arthroplasty postoperative day 1  · Orthopedics on board  · Continue PT OT

## 2019-07-09 NOTE — PROGRESS NOTES
07/09/19 1300   Clinical Encounter Type   Visited With Patient   Routine Visit Introduction   Patient Spiritual Encounters   Spiritual Assessment 4

## 2019-07-09 NOTE — PROGRESS NOTES
Progress Note - Ryan Spencer 1945, 69 y o  female MRN: [id number]    Unit/Bed#: [address] [place] [address] Encounter: [id number]    Primary Care Provider: Tone Fallon MD   Date and time admitted to hospital: 2019  6:51 AM        Anemia  Assessment & Plan  · Acute blood loss anemia as expected  In the postoperative course  · Plan for transfusion    Stage III chronic kidney disease ([address] )  Assessment & Plan  · Today creatinine is up to 1 66  · Monitor    Essential hypertension  Assessment & Plan  · Monitor blood pressure    * Primary osteoarthritis of right hip  Assessment & Plan  · Status post right total hip arthroplasty postoperative day 1  · Orthopedics on board  · Continue PT OT        VTE Pharmacologic Prophylaxis:   Pharmacologic: Heparin  Mechanical VTE Prophylaxis in Place: Yes    Patient Centered Rounds: I have performed bedside rounds with nursing staff today  Discussions with Specialists or Other Care Team Provider:     Education and Discussions with Family / Patient:  Time Spent for Care: 30 minutes  More than 50% of total time spent on counseling and coordination of care as described above  Current Length of Stay: 1 day(s)    Current Patient Status: Inpatient   Certification Statement: The patient will continue to require additional inpatient hospital stay due to anemia    Discharge Plan:     Code Status: Level 1 - Full Code      Subjective:     Objective:     Vitals:   Temp (24hrs), Av 8 °F (36 6 °C), Min:97 3 °F (36 3 °C), Max:98 9 °F (37 2 °C)    Temp:  [97 3 °F (36 3 °C)-98 9 °F (37 2 °C)] 97 8 °F (36 6 °C)  HR:  [59-90] 81  Resp:  [16-20] 20  BP: ()/(46-58) 124/56  SpO2:  [92 %-100 %] 94 %  Body mass index is 34 47 kg/m²  Input and Output Summary (last 24 hours):        Intake/Output Summary (Last 24 hours) at 2019 1448  Last data filed at 2019 1255  Gross per 24 hour   Intake 1550 ml   Output 250 ml   Net 1300 ml       Physical Exam:     Physical Exam Constitutional: She appears well-developed  No distress  HENT:   Head: Normocephalic  Neck: No JVD present  Cardiovascular: Normal rate  No murmur heard  Pulmonary/Chest: Effort normal and breath sounds normal  No respiratory distress  Abdominal: Soft  Bowel sounds are normal  She exhibits no distension  There is no tenderness  Musculoskeletal: Normal range of motion  Right hip site covered in dressing   Neurological: She is alert  Skin: Skin is warm  Additional Data:     Labs:    Results from last 7 days   Lab Units 07/09/19  0543   WBC Thousand/uL 7 90   HEMOGLOBIN g/dL 7 9*   HEMATOCRIT % 24 5*   PLATELETS Thousands/uL 159     Results from last 7 days   Lab Units 07/09/19  0543   POTASSIUM mmol/L 4 6   CHLORIDE mmol/L 104   CO2 mmol/L 20*   BUN mg/dL 26*   CREATININE mg/dL 1 66*   CALCIUM mg/dL 7 9*           * I Have Reviewed All Lab Data Listed Above  * Additional Pertinent Lab Tests Reviewed:  Jermain Turner Admission Reviewed    Imaging:    Imaging Reports Reviewed Today Include:   Imaging Personally Reviewed by Myself Includes:     Recent Cultures (last 7 days):           Last 24 Hours Medication List:     Current Facility-Administered Medications:  acetaminophen 650 mg Oral Q6H PRN Aleida Serene, PA-C    aluminum-magnesium hydroxide-simethicone 30 mL Oral Q6H PRN Aleida Serene, PA-C    ascorbic acid 500 mg Oral BID Aleida Serene, PA-C    bisacodyl 10 mg Rectal Daily PRN Aleida Serene, PA-C    calcium carbonate 1,000 mg Oral Daily PRN Aleida Serene, PA-C    citalopram 20 mg Oral Daily Aleida Serene, PA-C    docusate sodium 100 mg Oral BID Aleida Serene, PA-C    ferrous sulfate 325 mg Oral BID With Meals Aleida Serene, PA-C    folic acid 1 mg Oral Daily Aleida Serene, PA-C    gabapentin 100 mg Oral [facility] Aleida Serene, PA-C    heparin (porcine) 5,000 Units Subcutaneous [facility] Aleida Serene, PA-C    lactated ringers 75 mL/hr Intravenous Continuous Aleida Serene, PA-C Last Rate: Stopped (07/09/19 0930) morphine injection 2 mg Intravenous Q3H PRN Nish Murphy, PA-C    nystatin  Topical BID Nish Murphy, PA-C    ondansetron 4 mg Intravenous Q6H PRN Nish Murphy, PA-C    oxyCODONE 2 5 mg Oral Q4H PRN Nish Murphy, PA-C    oxyCODONE 5 mg Oral Q4H PRN Nish Murphy, PA-C    pantoprazole 40 mg Oral Daily Nish Murphy, PA-ANTHONY    senna 1 tablet Oral Daily Nish Murphy, PA-ANTHONY    simethicone 80 mg Oral 4x Daily PRN Nish Murphy, BECKA         Today, Patient Was Seen By: Sami Regan MD    ** Please Note: Dictation voice to text software may have been used in the creation of this document   **

## 2019-07-09 NOTE — PROGRESS NOTES
07/09/19 [place] Involvement Patient not active with Rastafarian;Sabianist not active in support   Spiritual Beliefs/Perceptions   Concept of God Accepting   Relationship with God Close   Spiritual Strengths   (Deep Zoroastrianism june   Primary source of meaning for patient )   Stress Factors   Patient Stress Factors None identified   Coping Responses   Patient Coping Accepting   Plan of Care   Care Plan Initiated Yes   Assessment Completed by: Unit visit

## 2019-07-09 NOTE — PROGRESS NOTES
Patients sbp 90, [facility] notified because I was unsure of how to proceed with hypotension protocol  Lab ordered to check patients hgb and to recheck bp at midnight and restart hypotension protocol at that time depending on her bp

## 2019-07-10 ENCOUNTER — TELEPHONE (OUTPATIENT)
Dept: FAMILY MEDICINE CLINIC | Facility: CLINIC | Age: 74
End: 2019-07-10

## 2019-07-10 LAB
ANION GAP SERPL CALCULATED.3IONS-SCNC: 8 MMOL/L (ref 4–13)
ANION GAP SERPL CALCULATED.3IONS-SCNC: 9 MMOL/L (ref 4–13)
BUN SERPL-MCNC: 33 MG/DL (ref 5–25)
BUN SERPL-MCNC: 36 MG/DL (ref 5–25)
CALCIUM SERPL-MCNC: 7.8 MG/DL (ref 8.3–10.1)
CALCIUM SERPL-MCNC: 8 MG/DL (ref 8.3–10.1)
CHLORIDE SERPL-SCNC: 101 MMOL/L (ref 100–108)
CHLORIDE SERPL-SCNC: 97 MMOL/L (ref 100–108)
CO2 SERPL-SCNC: 21 MMOL/L (ref 21–32)
CO2 SERPL-SCNC: 21 MMOL/L (ref 21–32)
CREAT SERPL-MCNC: 1.59 MG/DL (ref 0.6–1.3)
CREAT SERPL-MCNC: 1.65 MG/DL (ref 0.6–1.3)
ERYTHROCYTE [DISTWIDTH] IN BLOOD BY AUTOMATED COUNT: 14.8 % (ref 11.6–15.1)
GFR SERPL CREATININE-BSD FRML MDRD: 31 ML/MIN/1.73SQ M
GFR SERPL CREATININE-BSD FRML MDRD: 32 ML/MIN/1.73SQ M
GLUCOSE SERPL-MCNC: 109 MG/DL (ref 65–140)
GLUCOSE SERPL-MCNC: 97 MG/DL (ref 65–140)
HCT VFR BLD AUTO: 25.3 % (ref 34.8–46.1)
HGB BLD-MCNC: 8.2 G/DL (ref 11.5–15.4)
MCH RBC QN AUTO: 31.1 PG (ref 26.8–34.3)
MCHC RBC AUTO-ENTMCNC: 32.4 G/DL (ref 31.4–37.4)
MCV RBC AUTO: 96 FL (ref 82–98)
PLATELET # BLD AUTO: 129 THOUSANDS/UL (ref 149–390)
PMV BLD AUTO: 11.2 FL (ref 8.9–12.7)
POTASSIUM SERPL-SCNC: 3.7 MMOL/L (ref 3.5–5.3)
POTASSIUM SERPL-SCNC: 4.1 MMOL/L (ref 3.5–5.3)
RBC # BLD AUTO: 2.64 MILLION/UL (ref 3.81–5.12)
SODIUM SERPL-SCNC: 127 MMOL/L (ref 136–145)
SODIUM SERPL-SCNC: 130 MMOL/L (ref 136–145)
WBC # BLD AUTO: 8.07 THOUSAND/UL (ref 4.31–10.16)

## 2019-07-10 PROCEDURE — 85027 COMPLETE CBC AUTOMATED: CPT | Performed by: PHYSICIAN ASSISTANT

## 2019-07-10 PROCEDURE — 97116 GAIT TRAINING THERAPY: CPT

## 2019-07-10 PROCEDURE — 80048 BASIC METABOLIC PNL TOTAL CA: CPT | Performed by: FAMILY MEDICINE

## 2019-07-10 PROCEDURE — 97110 THERAPEUTIC EXERCISES: CPT

## 2019-07-10 PROCEDURE — 80048 BASIC METABOLIC PNL TOTAL CA: CPT | Performed by: PHYSICIAN ASSISTANT

## 2019-07-10 PROCEDURE — 97530 THERAPEUTIC ACTIVITIES: CPT

## 2019-07-10 PROCEDURE — 99024 POSTOP FOLLOW-UP VISIT: CPT | Performed by: PHYSICIAN ASSISTANT

## 2019-07-10 PROCEDURE — 99232 SBSQ HOSP IP/OBS MODERATE 35: CPT | Performed by: FAMILY MEDICINE

## 2019-07-10 RX ADMIN — PANTOPRAZOLE SODIUM 40 MG: 40 TABLET, DELAYED RELEASE ORAL at 09:20

## 2019-07-10 RX ADMIN — DOCUSATE SODIUM 100 MG: 100 CAPSULE, LIQUID FILLED ORAL at 09:21

## 2019-07-10 RX ADMIN — GABAPENTIN 100 MG: 100 CAPSULE ORAL at 06:20

## 2019-07-10 RX ADMIN — FERROUS SULFATE TAB 325 MG (65 MG ELEMENTAL FE) 325 MG: 325 (65 FE) TAB at 17:56

## 2019-07-10 RX ADMIN — HEPARIN SODIUM 5000 UNITS: 5000 INJECTION INTRAVENOUS; SUBCUTANEOUS at 22:40

## 2019-07-10 RX ADMIN — GABAPENTIN 100 MG: 100 CAPSULE ORAL at 22:40

## 2019-07-10 RX ADMIN — NYSTATIN: 100000 POWDER TOPICAL at 17:56

## 2019-07-10 RX ADMIN — OXYCODONE HYDROCHLORIDE AND ACETAMINOPHEN 500 MG: 500 TABLET ORAL at 17:56

## 2019-07-10 RX ADMIN — ACETAMINOPHEN 650 MG: 325 TABLET, FILM COATED ORAL at 06:19

## 2019-07-10 RX ADMIN — NYSTATIN 1 APPLICATION: 100000 POWDER TOPICAL at 09:22

## 2019-07-10 RX ADMIN — HEPARIN SODIUM 5000 UNITS: 5000 INJECTION INTRAVENOUS; SUBCUTANEOUS at 06:19

## 2019-07-10 RX ADMIN — CITALOPRAM HYDROBROMIDE 20 MG: 20 TABLET ORAL at 09:20

## 2019-07-10 RX ADMIN — ACETAMINOPHEN 650 MG: 325 TABLET, FILM COATED ORAL at 13:22

## 2019-07-10 RX ADMIN — FERROUS SULFATE TAB 325 MG (65 MG ELEMENTAL FE) 325 MG: 325 (65 FE) TAB at 09:20

## 2019-07-10 RX ADMIN — OXYCODONE HYDROCHLORIDE 5 MG: 5 TABLET ORAL at 13:23

## 2019-07-10 RX ADMIN — DOCUSATE SODIUM 100 MG: 100 CAPSULE, LIQUID FILLED ORAL at 17:56

## 2019-07-10 RX ADMIN — GABAPENTIN 100 MG: 100 CAPSULE ORAL at 14:41

## 2019-07-10 RX ADMIN — HEPARIN SODIUM 5000 UNITS: 5000 INJECTION INTRAVENOUS; SUBCUTANEOUS at 14:41

## 2019-07-10 RX ADMIN — SENNOSIDES 8.6 MG: 8.6 TABLET, FILM COATED ORAL at 09:20

## 2019-07-10 RX ADMIN — OXYCODONE HYDROCHLORIDE AND ACETAMINOPHEN 500 MG: 500 TABLET ORAL at 09:20

## 2019-07-10 RX ADMIN — FOLIC ACID 1 MG: 1 TABLET ORAL at 09:20

## 2019-07-10 NOTE — PLAN OF CARE
Problem: Prexisting or High Potential for Compromised Skin Integrity  Goal: Skin integrity is maintained or improved  Description  INTERVENTIONS:  - Identify patients at risk for skin breakdown  - Assess and monitor skin integrity  - Assess and monitor nutrition and hydration status  - Monitor labs (i e  albumin)  - Assess for incontinence   - Turn and reposition patient  - Assist with mobility/ambulation  - Relieve pressure over bony prominences  - Avoid friction and shearing  - Provide appropriate hygiene as needed including keeping skin clean and dry  - Evaluate need for skin moisturizer/barrier cream  - Collaborate with interdisciplinary team (i e  Nutrition, Rehabilitation, etc )   - Patient/family teaching  Outcome: Progressing     Problem: Potential for Falls  Goal: Patient will remain free of falls  Description  INTERVENTIONS:  - Assess patient frequently for physical needs  -  Identify cognitive and physical deficits and behaviors that affect risk of falls    -  Boaz fall precautions as indicated by assessment   - Educate patient/family on patient safety including physical limitations  - Instruct patient to call for assistance with activity based on assessment  - Modify environment to reduce risk of injury  - Consider OT/PT consult to assist with strengthening/mobility  Outcome: Progressing

## 2019-07-10 NOTE — PROGRESS NOTES
Orthopedics   Vanessa Camp 69 y o  female MRN: [id number]  Unit/Bed#: 2 [address]      Subjective:  69 y  o female post operative day #2 right total hip arthroplasty  Pt doing well  Pain controlled  She is lying comfortably in bed  She received one unit PRBC yesterday  She states she is feeling better today       Labs:  0   Lab Value Date/Time    HCT 25 3 (L) 07/10/2019 0604    HCT 27 8 (L) 07/09/2019 1447    HCT 24 5 (L) 07/09/2019 0543    HCT 37 1 03/23/2017 1016    HCT 37 1 02/12/2016 1349    HCT 36 4 02/01/2014 1605    HGB 8 2 (L) 07/10/2019 0604    HGB 9 0 (L) 07/09/2019 1447    HGB 7 9 (L) 07/09/2019 0543    HGB 12 3 03/23/2017 1016    HGB 12 2 02/12/2016 1349    HGB 12 6 02/01/2014 1605    INR 1 1 06/21/2019 1213    INR 1 24 (H) 05/06/2019 1010    WBC 8 07 07/10/2019 0604    WBC 7 90 07/09/2019 0543    WBC 9 97 07/08/2019 2314    WBC 6 6 03/23/2017 1016    WBC 5 9 02/12/2016 1349    WBC 5 62 02/01/2014 1605    ESR 33 (H) 06/21/2019 1213    CRP 2 6 06/21/2019 1213       Meds:    Current Facility-Administered Medications:     acetaminophen (TYLENOL) tablet 650 mg, 650 mg, Oral, Q6H PRN, Ethan Gamboaamer, PA-C, 650 mg at 07/10/19 6414    aluminum-magnesium hydroxide-simethicone (MYLANTA) 200-200-20 mg/5 mL oral suspension 30 mL, 30 mL, Oral, Q6H PRN, Ethan Verma, PA-C    ascorbic acid (VITAMIN C) tablet 500 mg, 500 mg, Oral, BID, Ethan Verma, PA-C, 500 mg at 07/10/19 0920    bisacodyl (DULCOLAX) rectal suppository 10 mg, 10 mg, Rectal, Daily PRN, Ethan Verma, PA-C    calcium carbonate (TUMS) chewable tablet 1,000 mg, 1,000 mg, Oral, Daily PRN, Ethan Verma PA-C    citalopram (CeleXA) tablet 20 mg, 20 mg, Oral, Daily, Ethan Verma PA-C, 20 mg at 07/10/19 0920    docusate sodium (COLACE) capsule 100 mg, 100 mg, Oral, BID, Ethan Verma PA-C, 100 mg at 07/10/19 9898    ferrous sulfate tablet 325 mg, 325 mg, Oral, BID With Meals, Ethan Verma, PA-C, 325 mg at 79/80/04 0357    folic acid (FOLVITE) tablet 1 mg, 1 mg, Oral, Daily, Love Render, PA-C, 1 mg at 07/10/19 0920    gabapentin (NEURONTIN) capsule 100 mg, 100 mg, Oral, Q8H [address], Love Render, PA-C, 100 mg at 07/10/19 0620    heparin (porcine) subcutaneous injection 5,000 Units, 5,000 Units, Subcutaneous, Q8H [address], 5,000 Units at 07/10/19 0619 **AND** [CANCELED] Platelet count, , , Once, Love Render, PA-C    lactated ringers infusion, 75 mL/hr, Intravenous, Continuous, Love Render, PA-C, Stopped at 07/09/19 0930    morphine injection 2 mg, 2 mg, Intravenous, Q3H PRN, Love Render, PA-C    nystatin (MYCOSTATIN) powder, , Topical, BID, Love Render, PA-C, 1 application at 72/67/12 9250    ondansetron (ZOFRAN) injection 4 mg, 4 mg, Intravenous, Q6H PRN, Love Render, PA-C, 4 mg at 07/08/19 1304    oxyCODONE (ROXICODONE) IR tablet 2 5 mg, 2 5 mg, Oral, Q4H PRN, Love Render, PA-C    oxyCODONE (ROXICODONE) IR tablet 5 mg, 5 mg, Oral, Q4H PRN, Love Render, PA-C    pantoprazole (PROTONIX) EC tablet 40 mg, 40 mg, Oral, Daily, Love Render, PA-C, 40 mg at 07/10/19 0920    senna (SENOKOT) tablet 8 6 mg, 1 tablet, Oral, Daily, Love Render, PA-C, 8 6 mg at 07/10/19 0920    simethicone (MYLICON) chewable tablet 80 mg, 80 mg, Oral, 4x Daily PRN, Love Render, PA-C    Blood Culture:   No results found for: BLOODCX    Wound Culture:   No results found for: WOUNDCULT    Ins and Outs:  I/O last 24 hours: In: 550 [P O :200; Blood:350]  Out: 700 [Urine:700]          Physical Exam:  Vitals:    07/10/19 0729   BP: 99/50   Pulse: 78   Resp: 16   Temp: 98 8 °F (37 1 °C)   SpO2: 97%     right lower extremity:  · Dressings C/D/I with some strike through- no worse than yesterday  · Sensation intact to light touch  · Moving ankle and toes  · 2+ dorsalis pedis   · Hip abduction pillow in place  · Thigh and calf compartments soft, nontender      _*_*_*_*_*_*_*_*_*_*_*_*_*_*_*_*_*_*_*_*_*_*_*_*_*_*_*_*_*_*_*_*_*_*_*_*_*_*_*_*_*    Assessment: 69 y  o female post operative day #2 right total hip arthroplasty  Plan:  · Weight Bearing as tolerated to RLE  · Up and out of bed  · Posterior total hip precautions  · Abduction pillow while in bed  · DVT prophylaxis- Heparin and mechanical  · Analgesics  · PT/OT  · ABLA- stable  Will continue to monitor vitals and H/H    · DC planning to rehab  Most likely DC tomorrow      Aleshia Jc PA-C

## 2019-07-10 NOTE — PLAN OF CARE
Problem: PHYSICAL THERAPY ADULT  Goal: Performs mobility at highest level of function for planned discharge setting  See evaluation for individualized goals  Description  Treatment/Interventions: ADL retraining, Functional transfer training, LE strengthening/ROM, Therapeutic exercise, Endurance training, Patient/family training, Equipment eval/education, Bed mobility, Gait training, Spoke to nursing, Spoke to case management  Equipment Recommended: Hassan Shone       See flowsheet documentation for full assessment, interventions and recommendations  Outcome: Progressing  Note:   Prognosis: Good  Problem List: Decreased strength, Decreased endurance, Impaired balance, Decreased mobility, Decreased coordination, Decreased safety awareness, Orthopedic restrictions  Assessment: Improving transfers requires Ax1  Naheed ther ex well  Continue to recommend shor tterm in-pt rehab atd/c ot maximize safe functional recovery  Con't BID  Barriers to Discharge: (medical status)     Recommendation: Short-term skilled PT     PT - OK to Discharge: Yes    See flowsheet documentation for full assessment

## 2019-07-10 NOTE — PROGRESS NOTES
Progress Note - Meme De Los Santos 1945, 69 y o  female MRN: [id number]    Unit/Bed#: [address] [place] [address] Encounter: [id number]    Primary Care Provider: Ulises Ramírez MD   Date and time admitted to hospital: 2019  6:51 AM        Anemia  Assessment & Plan  · Acute blood loss anemia as expected  In the postoperative course  · Plan for transfusion    Stage III chronic kidney disease ([address] )  Assessment & Plan  · Today creatinine is up to 1 5  · Monitor  · Baseline close to 1 3    Essential hypertension  Assessment & Plan  · Monitor blood pressure    * Primary osteoarthritis of right hip  Assessment & Plan  · Status post right total hip arthroplasty postoperative day 1  · Orthopedics on board  · Continue PT OT        VTE Pharmacologic Prophylaxis:   Pharmacologic: Heparin  Mechanical VTE Prophylaxis in Place: Yes    Patient Centered Rounds: I have performed bedside rounds with nursing staff today  Discussions with Specialists or Other Care Team Provider:     Education and Discussions with Family / Patient: patient    Time Spent for Care: 30 minutes  More than 50% of total time spent on counseling and coordination of care as described above  Current Length of Stay: 2 day(s)    Current Patient Status: Inpatient   Certification Statement: The patient will continue to require additional inpatient hospital stay due to Pending rehab    Discharge Plan:     Code Status: Level 1 - Full Code      Subjective:   Patient seen and examined, no specific complaints offered  Objective:     Vitals:   Temp (24hrs), Av 4 °F (36 9 °C), Min:98 1 °F (36 7 °C), Max:98 8 °F (37 1 °C)    Temp:  [98 1 °F (36 7 °C)-98 8 °F (37 1 °C)] 98 3 °F (36 8 °C)  HR:  [72-79] 72  Resp:  [16-18] 18  BP: ()/(50-56) 110/56  SpO2:  [96 %-98 %] 96 %  Body mass index is 34 47 kg/m²  Input and Output Summary (last 24 hours):        Intake/Output Summary (Last 24 hours) at 7/10/2019 1913  Last data filed at 7/10/2019 0701  Gross per 24 hour Intake    Output 300 ml   Net -300 ml       Physical Exam:     Physical Exam   Constitutional: She appears well-developed  No distress  HENT:   Head: Normocephalic  Eyes: Pupils are equal, round, and reactive to light  Neck: Normal range of motion  No tracheal deviation present  Cardiovascular: Normal rate  No murmur heard  Pulmonary/Chest: Effort normal  No respiratory distress  Abdominal: Soft  Musculoskeletal:   Right hip surgical site covered in dressing   Neurological: She is alert  No cranial nerve deficit  Skin: Skin is warm  Additional Data:     Labs:    Results from last 7 days   Lab Units 07/10/19  0604   WBC Thousand/uL 8 07   HEMOGLOBIN g/dL 8 2*   HEMATOCRIT % 25 3*   PLATELETS Thousands/uL 129*     Results from last 7 days   Lab Units 07/10/19  0604   POTASSIUM mmol/L 4 1   CHLORIDE mmol/L 101   CO2 mmol/L 21   BUN mg/dL 33*   CREATININE mg/dL 1 59*   CALCIUM mg/dL 8 0*           * I Have Reviewed All Lab Data Listed Above  * Additional Pertinent Lab Tests Reviewed:  Jermain 66 Admission Reviewed    Imaging:    Imaging Reports Reviewed Today Include:   Imaging Personally Reviewed by Myself Includes:      Recent Cultures (last 7 days):           Last 24 Hours Medication List:     Current Facility-Administered Medications:  acetaminophen 650 mg Oral Q6H PRN Olivia Ports, PA-C   aluminum-magnesium hydroxide-simethicone 30 mL Oral Q6H PRN Olivia Ports, PA-C   ascorbic acid 500 mg Oral BID Olivia Ports, PA-C   bisacodyl 10 mg Rectal Daily PRN Olivia Ports, PA-C   calcium carbonate 1,000 mg Oral Daily PRN Olivia Ports, PA-C   citalopram 20 mg Oral Daily Olivia Ports, PA-C   docusate sodium 100 mg Oral BID Olivia Ports, PA-C   ferrous sulfate 325 mg Oral BID With Meals Olivia Ports, PA-C   folic acid 1 mg Oral Daily Olivia Ports, PA-C   gabapentin 100 mg Oral [facility] Olivia Ports, PA-C   heparin (porcine) 5,000 Units Subcutaneous [facility] Olivia Ports, PA-C   nystatin  Topical BID Sudheer Dejesus Claus, BECKA   ondansetron 4 mg Intravenous Q6H PRN Zayda Slough, PA-C   oxyCODONE 2 5 mg Oral Q4H PRN Zayda Slough, PA-C   oxyCODONE 5 mg Oral Q4H PRN Zayda Slough, PA-C   pantoprazole 40 mg Oral Daily Zayda Slough, PA-ANTHONY   senna 1 tablet Oral Daily Zayda Slough, PA-C   simethicone 80 mg Oral 4x Daily PRN Zayda Slough, BECKA        Today, Patient Was Seen By: Adin Awad MD    ** Please Note: Dictation voice to text software may have been used in the creation of this document   **

## 2019-07-10 NOTE — SOCIAL WORK
Continuing to follow patient  She is approved for admission at [facility] , that is her first choice  A bed will be available on Thursday

## 2019-07-10 NOTE — TELEPHONE ENCOUNTER
I called patient to [facility] with details, letting her know Homer Son wants patient to follow up in our office  Patient is to call to make a appointment

## 2019-07-10 NOTE — PHYSICAL THERAPY NOTE
PT tx     07/10/19 1010   Pain Assessment   Pain Assessment 0-10   Pain Score 2   Pain Location Hip   Pain Orientation Right   Precautions   Total Hip Replacement ADduction; Internal rotation; Flexion   Restrictions/Precautions   RLE Weight Bearing Per Order WBAT   General   Chart Reviewed Yes   Additional Pertinent History hgb 8 2   Cognition   Overall Cognitive Status WFL   Arousal/Participation Alert   Attention Within functional limits   Orientation Level Oriented X4   Memory Within functional limits   Following Commands Follows all commands and directions without difficulty   Subjective   Subjective Feels ok   Bed Mobility   Rolling R 4  Minimal assistance   Additional items Assist x 1;HOB elevated; Bedrails   Supine to Sit 3  Moderate assistance   Additional items Assist x 1;Bedrails;HOB elevated;LE management   Transfers   Sit to Stand 4  Minimal assistance  (x3 reps and stood for 1 min each)   Additional items Assist x 1; Armrests; Increased time required;Verbal cues  (r foot blocked)   Stand to Sit 4  Minimal assistance   Additional items Assist x 1; Impulsive;Armrests; Increased time required   Stand pivot 4  Minimal assistance   Additional items Assist x 1;Verbal cues; Increased time required  (rw)   Ambulation/Elevation   Gait pattern Forward Flexion; Shuffling; Inconsistent dalton; Short stride; Excessively slow   Gait Assistance 4  Minimal assist   Additional items Assist x 1   Assistive Device Rolling walker   Distance 5'x2   Balance   Static Sitting Fair   Dynamic Sitting Fair -   Static Standing Fair   Dynamic Standing Fair -   Ambulatory Fair -   Endurance Deficit   Endurance Deficit No   Activity Tolerance   Activity Tolerance Patient tolerated treatment well   Nurse Made Aware yes  on commode faollowing tx with bell in reach   Exercises   Knee AROM Long Arc Quad 10 reps; Sitting;AROM; Right   THR Supine;10 reps;AAROM; Right   Assessment   Prognosis Good   Problem List Decreased strength;Decreased endurance; Impaired balance;Decreased mobility; Decreased coordination;Decreased safety awareness;Orthopedic restrictions   Assessment Improving transfers requires Ax1  Naheed ther ex well  Continue to recommend shor tterm in-pt rehab atd/c ot maximize safe functional recovery  Con't BID   Goals   Patient Goals get better   Plan   Treatment/Interventions ADL retraining;Functional transfer training;LE strengthening/ROM; Therapeutic exercise; Endurance training;Patient/family training;Equipment eval/education; Bed mobility;Gait training;Spoke to nursing;Spoke to case management   Progress Progressing toward goals   PT Frequency 5x/wk; Twice a day   Recommendation   Recommendation Short-term skilled PT   Equipment Recommended Aravind Trujillo PT

## 2019-07-11 PROBLEM — N17.9 ACUTE KIDNEY INJURY SUPERIMPOSED ON CHRONIC KIDNEY DISEASE (HCC): Status: ACTIVE | Noted: 2019-04-09

## 2019-07-11 PROBLEM — E87.1 HYPONATREMIA: Status: ACTIVE | Noted: 2019-07-11

## 2019-07-11 PROBLEM — N18.9 ACUTE KIDNEY INJURY SUPERIMPOSED ON CHRONIC KIDNEY DISEASE (HCC): Status: ACTIVE | Noted: 2019-04-09

## 2019-07-11 LAB
ABO GROUP BLD BPU: NORMAL
ANION GAP SERPL CALCULATED.3IONS-SCNC: 11 MMOL/L (ref 4–13)
APTT PPP: 49 SECONDS (ref 23–37)
BPU ID: NORMAL
BUN SERPL-MCNC: 36 MG/DL (ref 5–25)
CALCIUM SERPL-MCNC: 7.8 MG/DL (ref 8.3–10.1)
CHLORIDE SERPL-SCNC: 99 MMOL/L (ref 100–108)
CO2 SERPL-SCNC: 19 MMOL/L (ref 21–32)
CREAT SERPL-MCNC: 1.45 MG/DL (ref 0.6–1.3)
CROSSMATCH: NORMAL
ERYTHROCYTE [DISTWIDTH] IN BLOOD BY AUTOMATED COUNT: 14.1 % (ref 11.6–15.1)
GFR SERPL CREATININE-BSD FRML MDRD: 36 ML/MIN/1.73SQ M
GLUCOSE SERPL-MCNC: 92 MG/DL (ref 65–140)
HCT VFR BLD AUTO: 22.9 % (ref 34.8–46.1)
HCT VFR BLD AUTO: 30.4 % (ref 34.8–46.1)
HGB BLD-MCNC: 10.2 G/DL (ref 11.5–15.4)
HGB BLD-MCNC: 7.8 G/DL (ref 11.5–15.4)
INR PPP: 1.26 (ref 0.84–1.19)
MCH RBC QN AUTO: 31.8 PG (ref 26.8–34.3)
MCHC RBC AUTO-ENTMCNC: 34.1 G/DL (ref 31.4–37.4)
MCV RBC AUTO: 94 FL (ref 82–98)
OSMOLALITY UR/SERPL-RTO: 277 MMOL/KG (ref 282–298)
OSMOLALITY UR: 274 MMOL/KG
PLATELET # BLD AUTO: 127 THOUSANDS/UL (ref 149–390)
PMV BLD AUTO: 11 FL (ref 8.9–12.7)
POTASSIUM SERPL-SCNC: 3.7 MMOL/L (ref 3.5–5.3)
PROTHROMBIN TIME: 15.5 SECONDS (ref 11.6–14.5)
RBC # BLD AUTO: 2.45 MILLION/UL (ref 3.81–5.12)
SODIUM 24H UR-SCNC: 21 MOL/L
SODIUM SERPL-SCNC: 129 MMOL/L (ref 136–145)
UNIT DISPENSE STATUS: NORMAL
UNIT PRODUCT CODE: NORMAL
UNIT RH: NORMAL
WBC # BLD AUTO: 6.96 THOUSAND/UL (ref 4.31–10.16)

## 2019-07-11 PROCEDURE — 85014 HEMATOCRIT: CPT | Performed by: PHYSICIAN ASSISTANT

## 2019-07-11 PROCEDURE — 85730 THROMBOPLASTIN TIME PARTIAL: CPT | Performed by: PHYSICIAN ASSISTANT

## 2019-07-11 PROCEDURE — 30233N1 TRANSFUSION OF NONAUTOLOGOUS RED BLOOD CELLS INTO PERIPHERAL VEIN, PERCUTANEOUS APPROACH: ICD-10-PCS | Performed by: ORTHOPAEDIC SURGERY

## 2019-07-11 PROCEDURE — 84300 ASSAY OF URINE SODIUM: CPT | Performed by: GENERAL PRACTICE

## 2019-07-11 PROCEDURE — 85610 PROTHROMBIN TIME: CPT | Performed by: PHYSICIAN ASSISTANT

## 2019-07-11 PROCEDURE — 99233 SBSQ HOSP IP/OBS HIGH 50: CPT | Performed by: GENERAL PRACTICE

## 2019-07-11 PROCEDURE — 83930 ASSAY OF BLOOD OSMOLALITY: CPT | Performed by: GENERAL PRACTICE

## 2019-07-11 PROCEDURE — 99024 POSTOP FOLLOW-UP VISIT: CPT | Performed by: ORTHOPAEDIC SURGERY

## 2019-07-11 PROCEDURE — 85027 COMPLETE CBC AUTOMATED: CPT | Performed by: PHYSICIAN ASSISTANT

## 2019-07-11 PROCEDURE — P9016 RBC LEUKOCYTES REDUCED: HCPCS

## 2019-07-11 PROCEDURE — 97530 THERAPEUTIC ACTIVITIES: CPT

## 2019-07-11 PROCEDURE — 83935 ASSAY OF URINE OSMOLALITY: CPT | Performed by: GENERAL PRACTICE

## 2019-07-11 PROCEDURE — 80048 BASIC METABOLIC PNL TOTAL CA: CPT | Performed by: PHYSICIAN ASSISTANT

## 2019-07-11 PROCEDURE — 85018 HEMOGLOBIN: CPT | Performed by: PHYSICIAN ASSISTANT

## 2019-07-11 RX ORDER — OXYCODONE HYDROCHLORIDE 5 MG/1
5 TABLET ORAL EVERY 4 HOURS PRN
Qty: 30 TABLET | Refills: 0 | Status: SHIPPED | OUTPATIENT
Start: 2019-07-11 | End: 2019-07-21

## 2019-07-11 RX ORDER — HEPARIN SODIUM 5000 [USP'U]/ML
5000 INJECTION, SOLUTION INTRAVENOUS; SUBCUTANEOUS EVERY 8 HOURS SCHEDULED
Qty: 120 ML | Refills: 0 | Status: SHIPPED | OUTPATIENT
Start: 2019-07-11 | End: 2019-08-27

## 2019-07-11 RX ORDER — SODIUM CHLORIDE 9 MG/ML
50 INJECTION, SOLUTION INTRAVENOUS CONTINUOUS
Status: DISPENSED | OUTPATIENT
Start: 2019-07-11 | End: 2019-07-11

## 2019-07-11 RX ADMIN — OXYCODONE HYDROCHLORIDE AND ACETAMINOPHEN 500 MG: 500 TABLET ORAL at 17:57

## 2019-07-11 RX ADMIN — FERROUS SULFATE TAB 325 MG (65 MG ELEMENTAL FE) 325 MG: 325 (65 FE) TAB at 17:57

## 2019-07-11 RX ADMIN — FERROUS SULFATE TAB 325 MG (65 MG ELEMENTAL FE) 325 MG: 325 (65 FE) TAB at 08:22

## 2019-07-11 RX ADMIN — SENNOSIDES 8.6 MG: 8.6 TABLET, FILM COATED ORAL at 08:22

## 2019-07-11 RX ADMIN — HEPARIN SODIUM 5000 UNITS: 5000 INJECTION INTRAVENOUS; SUBCUTANEOUS at 22:30

## 2019-07-11 RX ADMIN — FOLIC ACID 1 MG: 1 TABLET ORAL at 08:22

## 2019-07-11 RX ADMIN — DOCUSATE SODIUM 100 MG: 100 CAPSULE, LIQUID FILLED ORAL at 17:57

## 2019-07-11 RX ADMIN — HEPARIN SODIUM 5000 UNITS: 5000 INJECTION INTRAVENOUS; SUBCUTANEOUS at 13:47

## 2019-07-11 RX ADMIN — GABAPENTIN 100 MG: 100 CAPSULE ORAL at 22:30

## 2019-07-11 RX ADMIN — NYSTATIN: 100000 POWDER TOPICAL at 17:58

## 2019-07-11 RX ADMIN — DOCUSATE SODIUM 100 MG: 100 CAPSULE, LIQUID FILLED ORAL at 08:22

## 2019-07-11 RX ADMIN — GABAPENTIN 100 MG: 100 CAPSULE ORAL at 05:30

## 2019-07-11 RX ADMIN — SODIUM CHLORIDE 50 ML/HR: 0.9 INJECTION, SOLUTION INTRAVENOUS at 12:40

## 2019-07-11 RX ADMIN — GABAPENTIN 100 MG: 100 CAPSULE ORAL at 13:47

## 2019-07-11 RX ADMIN — OXYCODONE HYDROCHLORIDE AND ACETAMINOPHEN 500 MG: 500 TABLET ORAL at 08:22

## 2019-07-11 RX ADMIN — CITALOPRAM HYDROBROMIDE 20 MG: 20 TABLET ORAL at 08:22

## 2019-07-11 RX ADMIN — ACETAMINOPHEN 650 MG: 325 TABLET, FILM COATED ORAL at 06:42

## 2019-07-11 RX ADMIN — NYSTATIN: 100000 POWDER TOPICAL at 08:23

## 2019-07-11 RX ADMIN — PANTOPRAZOLE SODIUM 40 MG: 40 TABLET, DELAYED RELEASE ORAL at 08:23

## 2019-07-11 RX ADMIN — HEPARIN SODIUM 5000 UNITS: 5000 INJECTION INTRAVENOUS; SUBCUTANEOUS at 05:30

## 2019-07-11 NOTE — PLAN OF CARE
Problem: PHYSICAL THERAPY ADULT  Goal: Performs mobility at highest level of function for planned discharge setting  See evaluation for individualized goals  Description  Treatment/Interventions: ADL retraining, Functional transfer training, LE strengthening/ROM, Therapeutic exercise, Endurance training, Patient/family training, Equipment eval/education, Bed mobility, Gait training, Spoke to nursing, Spoke to case management  Equipment Recommended: Gregor Patel       See flowsheet documentation for full assessment, interventions and recommendations  Outcome: Progressing  Note:   Prognosis: Good  Problem List: Decreased strength  Assessment: Able to mobilize to commode and amb to bed with 1 asisst and RW  States no pain  Anxious to progress to rehab  Barriers to Discharge: (medical status)     Recommendation: Short-term skilled PT     PT - OK to Discharge: Yes    See flowsheet documentation for full assessment

## 2019-07-11 NOTE — PLAN OF CARE
Problem: Prexisting or High Potential for Compromised Skin Integrity  Goal: Skin integrity is maintained or improved  Description  INTERVENTIONS:  - Identify patients at risk for skin breakdown  - Assess and monitor skin integrity  - Assess and monitor nutrition and hydration status  - Monitor labs (i e  albumin)  - Assess for incontinence   - Turn and reposition patient  - Assist with mobility/ambulation  - Relieve pressure over bony prominences  - Avoid friction and shearing  - Provide appropriate hygiene as needed including keeping skin clean and dry  - Evaluate need for skin moisturizer/barrier cream  - Collaborate with interdisciplinary team (i e  Nutrition, Rehabilitation, etc )   - Patient/family teaching  Outcome: Progressing     Problem: Potential for Falls  Goal: Patient will remain free of falls  Description  INTERVENTIONS:  - Assess patient frequently for physical needs  -  Identify cognitive and physical deficits and behaviors that affect risk of falls    -  [place] fall precautions as indicated by assessment   - Educate patient/family on patient safety including physical limitations  - Instruct patient to call for assistance with activity based on assessment  - Modify environment to reduce risk of injury  - Consider OT/PT consult to assist with strengthening/mobility  Outcome: Progressing

## 2019-07-11 NOTE — PROGRESS NOTES
Orthopedics   Rodri Russ 69 y o  female MRN: [id number]  Unit/Bed#: 2 [address]      Subjective:  69 y  o female post operative day #3 right total hip arthroplasty  Pt doing well  Pain controlled  She is lying comfortably in bed  She sat in a chair for 3 hours yesterday and was able to walk  She denies any lightheadedness when getting up       Labs:  0   Lab Value Date/Time    HCT 22 9 (L) 07/11/2019 0519    HCT 25 3 (L) 07/10/2019 0604    HCT 27 8 (L) 07/09/2019 1447    HCT 37 1 03/23/2017 1016    HCT 37 1 02/12/2016 1349    HCT 36 4 02/01/2014 1605    HGB 7 8 (L) 07/11/2019 0519    HGB 8 2 (L) 07/10/2019 0604    HGB 9 0 (L) 07/09/2019 1447    HGB 12 3 03/23/2017 1016    HGB 12 2 02/12/2016 1349    HGB 12 6 02/01/2014 1605    INR 1 1 06/21/2019 1213    INR 1 24 (H) 05/06/2019 1010    WBC 6 96 07/11/2019 0519    WBC 8 07 07/10/2019 0604    WBC 7 90 07/09/2019 0543    WBC 6 6 03/23/2017 1016    WBC 5 9 02/12/2016 1349    WBC 5 62 02/01/2014 1605    ESR 33 (H) 06/21/2019 1213    CRP 2 6 06/21/2019 1213       Meds:    Current Facility-Administered Medications:     acetaminophen (TYLENOL) tablet 650 mg, 650 mg, Oral, Q6H PRN, Veleta Clock, PA-C, 650 mg at 07/11/19 3626    aluminum-magnesium hydroxide-simethicone (MYLANTA) 200-200-20 mg/5 mL oral suspension 30 mL, 30 mL, Oral, Q6H PRN, Veleta Clock, PA-C    ascorbic acid (VITAMIN C) tablet 500 mg, 500 mg, Oral, BID, Veleta Clock, PA-C, 500 mg at 07/10/19 1756    bisacodyl (DULCOLAX) rectal suppository 10 mg, 10 mg, Rectal, Daily PRN, Veleta Clock, PA-C    calcium carbonate (TUMS) chewable tablet 1,000 mg, 1,000 mg, Oral, Daily PRN, Veleta Clock, PA-C    citalopram (CeleXA) tablet 20 mg, 20 mg, Oral, Daily, Veleta Clock, PA-C, 20 mg at 07/10/19 0920    docusate sodium (COLACE) capsule 100 mg, 100 mg, Oral, BID, Veleta Clock, PA-C, 100 mg at 07/10/19 1756    ferrous sulfate tablet 325 mg, 325 mg, Oral, BID With Meals, Veleta Clock, PA-C, 325 mg at 76/07/48 2599    folic acid (FOLVITE) tablet 1 mg, 1 mg, Oral, Daily, Aleshia Rape, PA-C, 1 mg at 07/10/19 0920    gabapentin (NEURONTIN) capsule 100 mg, 100 mg, Oral, Q8H [facility], Aleshia Rape, PA-C, 100 mg at 07/11/19 0530    heparin (porcine) subcutaneous injection 5,000 Units, 5,000 Units, Subcutaneous, Q8H [facility], 5,000 Units at 07/11/19 0530 **AND** [CANCELED] Platelet count, , , Once, Aleshia Rape, PA-C    nystatin (MYCOSTATIN) powder, , Topical, BID, Aleshia Rape, PA-C    ondansetron TE[facility]F [facility]) injection 4 mg, 4 mg, Intravenous, Q6H PRN, Aleshia Rape, PA-C, 4 mg at 07/08/19 1304    oxyCODONE (ROXICODONE) IR tablet 2 5 mg, 2 5 mg, Oral, Q4H PRN, Aleshia Rape, PA-C    oxyCODONE (ROXICODONE) IR tablet 5 mg, 5 mg, Oral, Q4H PRN, Aleshia Rape, PA-C, 5 mg at 07/10/19 1323    pantoprazole (PROTONIX) EC tablet 40 mg, 40 mg, Oral, Daily, Aleshia Rape, PA-C, 40 mg at 07/10/19 0920    senna (SENOKOT) tablet 8 6 mg, 1 tablet, Oral, Daily, Aleshia Rape, PA-C, 8 6 mg at 07/10/19 0920    simethicone (MYLICON) chewable tablet 80 mg, 80 mg, Oral, 4x Daily PRN, Aleshia Rape, PA-C    Blood Culture:   No results found for: BLOODCX    Wound Culture:   No results found for: WOUNDCULT    Ins and Outs:  I/O last 24 hours: In: -   Out: 500 [Urine:500]          Physical Exam:  Vitals:    07/11/19 0729   BP: 99/71   Pulse: 67   Resp: 18   Temp: 98 1 °F (36 7 °C)   SpO2: 97%     right lower extremity:  · Dressings C/D/I with some strike through- no worse than yesterday  · Sensation intact to light touch  · Moving ankle and toes  · 2+ dorsalis pedis   · Thigh and calf compartments soft, nontender      _*_*_*_*_*_*_*_*_*_*_*_*_*_*_*_*_*_*_*_*_*_*_*_*_*_*_*_*_*_*_*_*_*_*_*_*_*_*_*_*_*    Assessment: 69 y  o female post operative day #3 right total hip arthroplasty       Plan:  · Weight Bearing as tolerated to RLE  · Up and out of bed  · Posterior total hip precautions  · Abduction pillow while in bed  · DVT prophylaxis- Heparin and mechanical  Will discharge on Heparin SQ for total of 6 weeks  · Analgesics  · PT/OT  · ABLA- hbg 7 8 this AM  Will give one unit PRBC this AM  Spoke with internal medicine  Will check coag labs as well  · DC planning to rehab  Will hold discharge until tomorrow      Aleshia Jc PA-C

## 2019-07-11 NOTE — ASSESSMENT & PLAN NOTE
· Acute blood loss anemia as expected  In the postoperative course  · Agree w/ 1U PRBC ordered by primary - keep Hgb at 8

## 2019-07-11 NOTE — PHYSICAL THERAPY NOTE
PT tx     07/11/19 1345   Pain Assessment   Pain Assessment No/denies pain   Pain Score No Pain   Precautions   Total Hip Replacement ADduction; Internal rotation; Flexion   Restrictions/Precautions   Weight Bearing Precautions Per Order Yes   RLE Weight Bearing Per Order WBAT   General   Chart Reviewed Yes   Cognition   Overall Cognitive Status WFL   Arousal/Participation Alert   Attention Within functional limits   Orientation Level Oriented X4   Memory Within functional limits   Subjective   Subjective no c/os d/c cancelled until tomorrow   Bed Mobility   Sit to Supine 4  Minimal assistance   Additional items Assist x 1   Transfers   Sit to Stand 3  Moderate assistance   Additional items Assist x 1   Stand to Sit 4  Minimal assistance   Additional items Assist x 1   Stand pivot 3  Moderate assistance   Additional items Assist x 1   Ambulation/Elevation   Gait pattern Forward Flexion; Wide AVINASH; Decreased foot clearance; Inconsistent dalton; Improper Weight shift   Gait Assistance 4  Minimal assist   Additional items Assist x 1   Assistive Device Rolling walker   Distance 8'x2   Balance   Static Sitting Fair   Dynamic Sitting Fair -   Static Standing Fair -   Dynamic Standing Fair -   Ambulatory Fair -   Endurance Deficit   Endurance Deficit No   Activity Tolerance   Activity Tolerance Patient tolerated treatment well   Nurse Made Aware yes   Exercises   THR Supine;10 reps;AAROM   Assessment   Prognosis Good   Problem List Decreased strength   Assessment Able to mobilize to commode and amb to bed with 1 asisst and RW  States no pain  Anxious to progress to rehab  Goals   Patient Goals get better   Plan   Treatment/Interventions ADL retraining;Functional transfer training;LE strengthening/ROM; Therapeutic exercise; Endurance training;Patient/family training;Equipment eval/education; Bed mobility;Gait training;Spoke to nursing;Spoke to case management   Progress Improving as expected   Recommendation   Recommendation Short-term skilled PT   Equipment Recommended Munira Carter

## 2019-07-11 NOTE — PROGRESS NOTES
Progress Note - Gomez Gum 1945, 69 y o  female MRN: [id number]    Unit/Bed#: [address] [place] [address] Encounter: [id number]    Primary Care Provider: Isatu Mendez MD   Date and time admitted to hospital: 2019  6:51 AM        Hyponatremia  Assessment & Plan  Na at 129 today  Likely hypovolemic in setting of being on Lozol and getting LR  Will give gentle IV NS  Check S OSm, U OSm, and U Na  As hypoNa is acute, would only d/c if Na > 130    Anemia  Assessment & Plan  · Acute blood loss anemia as expected  In the postoperative course  · Agree w/ 1U PRBC ordered by primary - keep Hgb at 8    Acute kidney injury superimposed on chronic kidney disease ([facility] [address] )  Assessment & Plan  · POA  · Baseline close to 1 3 - Cr close to baseline - will give NS    Essential hypertension  Assessment & Plan  · Monitor blood pressure    * Primary osteoarthritis of right hip  Assessment & Plan  · Status post right total hip arthroplasty   · Orthopedics on board as primary  · Continue PT OT    VTE Pharmacologic Prophylaxis:   Pharmacologic: Heparin  Mechanical VTE Prophylaxis in Place: Yes    Patient Centered Rounds: I have performed bedside rounds with nursing staff today  Discussions with Specialists or Other Care Team Provider: ortho    Education and Discussions with Family / Patient: pt    Time Spent for Care: 30 minutes  More than 50% of total time spent on counseling and coordination of care as described above      Current Length of Stay: 3 day(s)    Current Patient Status: Inpatient   Certification Statement: The patient will continue to require additional inpatient hospital stay due to need to monitor H/H and Na    Discharge Plan: ok w/ d/c if Hgb at 8 or higher and Na > 130    Code Status: Level 1 - Full Code      Subjective:   No acute ocmplaints    Objective:     Vitals:   Temp (24hrs), Av 6 °F (37 °C), Min:98 1 °F (36 7 °C), Max:99 1 °F (37 3 °C)    Temp:  [98 1 °F (36 7 °C)-99 1 °F (37 3 °C)] 98 7 °F (37 1 °C)  HR:  [] 76  Resp:  [18-26] 18  BP: ()/(50-71) 113/51  SpO2:  [95 %-98 %] 98 %  Body mass index is 34 47 kg/m²  Input and Output Summary (last 24 hours): Intake/Output Summary (Last 24 hours) at 7/11/2019 1537  Last data filed at 7/11/2019 1408  Gross per 24 hour   Intake 350 ml   Output 390 ml   Net -40 ml       Physical Exam:     Physical Exam   Constitutional: She is oriented to person, place, and time  No distress  HENT:   Head: Normocephalic and atraumatic  Eyes: Conjunctivae and EOM are normal    Neck: Normal range of motion  Neck supple  Cardiovascular: Normal rate and regular rhythm  Pulmonary/Chest: Effort normal and breath sounds normal  She has no wheezes  She has no rales  Abdominal: Soft  Bowel sounds are normal  She exhibits no distension  There is no tenderness  Musculoskeletal: She exhibits no edema  Neurological: She is alert and oriented to person, place, and time  Skin: Skin is warm and dry  She is not diaphoretic  Additional Data:     Labs:    Results from last 7 days   Lab Units 07/11/19  1402 07/11/19  0519   WBC Thousand/uL  --  6 96   HEMOGLOBIN g/dL 10 2* 7 8*   HEMATOCRIT % 30 4* 22 9*   PLATELETS Thousands/uL  --  127*     Results from last 7 days   Lab Units 07/11/19  0519   POTASSIUM mmol/L 3 7   CHLORIDE mmol/L 99*   CO2 mmol/L 19*   BUN mg/dL 36*   CREATININE mg/dL 1 45*   CALCIUM mg/dL 7 8*     Results from last 7 days   Lab Units 07/11/19  0941   INR  1 26*       * I Have Reviewed All Lab Data Listed Above  * Additional Pertinent Lab Tests Reviewed:  [facility]        Recent Cultures (last 7 days):           Last 24 Hours Medication List:     Current Facility-Administered Medications:  acetaminophen 650 mg Oral Q6H PRN Love Render, PA-C    aluminum-magnesium hydroxide-simethicone 30 mL Oral Q6H PRN Love Render, PA-C    ascorbic acid 500 mg Oral BID Love Render, PA-C    bisacodyl 10 mg Rectal Daily PRN Shannon Babcock PA-C    calcium carbonate 1,000 mg Oral Daily PRN Shannon Babcock PA-C    citalopram 20 mg Oral Daily Shannon Babcock PA-C    docusate sodium 100 mg Oral BID Shannon Babcock PA-C    ferrous sulfate 325 mg Oral BID With Meals Shannon Babcock PA-C    folic acid 1 mg Oral Daily Shannon Babcock PA-C    gabapentin 100 mg Oral [facility] Shannon Babcock PA-C    heparin (porcine) 5,000 Units Subcutaneous [facility] Shannon Babcock PA-C    nystatin  Topical BID Shannon Babcock PA-C    ondansetron 4 mg Intravenous Q6H PRN Shannon Babcock PA-C    oxyCODONE 2 5 mg Oral Q4H PRN Shannon Babcock PA-C    oxyCODONE 5 mg Oral Q4H PRN Shannon Babcock PA-C    pantoprazole 40 mg Oral Daily Shannon Babcock PA-C    senna 1 tablet Oral Daily Shannon Babcock PA-C    simethicone 80 mg Oral 4x Daily PRN Shannon Babcock PA-C    sodium chloride 50 mL/hr Intravenous Continuous Scottie Marks DO Last Rate: 50 mL/hr (07/11/19 1240)        Today, Patient Was Seen By: Scottie Marks DO    ** Please Note: Dictation voice to text software may have been used in the creation of this document   **

## 2019-07-11 NOTE — PHYSICAL THERAPY NOTE
PT tx     07/11/19 0955   Pain Assessment   Pain Score No Pain   Precautions   Total Hip Replacement ADduction; Internal rotation; Flexion   Restrictions/Precautions   Weight Bearing Precautions Per Order Yes   RLE Weight Bearing Per Order WBAT   General   Chart Reviewed Yes   Additional Pertinent History getting 1 unit PRBC's d/c held for today tent for tomorrow to rehab  Cognition   Overall Cognitive Status WFL   Arousal/Participation Alert  (anxious)   Attention Within functional limits   Orientation Level Oriented X4   Memory Within functional limits   Subjective   Subjective feels ok need to go on commode   Bed Mobility   Supine to Sit 4  Minimal assistance   Additional items Assist x 1   Sit to Supine 4  Minimal assistance   Additional items Assist x 1   Transfers   Sit to Stand 3  Moderate assistance   Additional items Assist x 1   Stand to Sit 4  Minimal assistance   Additional items Assist x 1   Stand pivot 3  Moderate assistance   Additional items Assist x 1   Ambulation/Elevation   Gait pattern Forward Flexion; Wide AVINASH;Shuffling; Inconsistent dalton; Short stride; Excessively slow   Gait Assistance 3  Moderate assist   Additional items Assist x 1   Assistive Device Rolling walker   Distance 5'x2   Balance   Static Sitting Fair   Dynamic Sitting Fair -   Static Standing Fair -   Dynamic Standing Fair -   Ambulatory Fair -   Endurance Deficit   Endurance Deficit Yes   Activity Tolerance   Activity Tolerance Patient tolerated treatment well   Nurse Made Aware yes   Assessment   Prognosis Good   Problem List Decreased strength;Decreased range of motion;Decreased endurance; Impaired balance;Decreased mobility; Decreased coordination;Decreased safety awareness;Orthopedic restrictions   Assessment Pt transferred to commode then to recliner  Pt to stat blood transfusion  D/c Cancelled for today   Will continue as jordy   Goals   Patient Goals get better   Plan   Treatment/Interventions ADL retraining;Functional transfer training;LE strengthening/ROM; Therapeutic exercise; Endurance training;Cognitive reorientation;Patient/family training;Equipment eval/education; Bed mobility;Gait training;Spoke to nursing;Spoke to case management;OT   Progress Improving as expected   Recommendation   Recommendation Short-term skilled PT   Equipment Recommended Leslie Mckay, PT

## 2019-07-11 NOTE — ASSESSMENT & PLAN NOTE
Na at 129 today  Likely hypovolemic in setting of being on Lozol and getting LR  Will give gentle IV NS  Check S OSm, U OSm, and U Na  As hypoNa is acute, would only d/c if Na > 130

## 2019-07-12 VITALS
RESPIRATION RATE: 18 BRPM | OXYGEN SATURATION: 99 % | HEIGHT: 64 IN | WEIGHT: 200.8 LBS | BODY MASS INDEX: 34.28 KG/M2 | DIASTOLIC BLOOD PRESSURE: 60 MMHG | HEART RATE: 67 BPM | TEMPERATURE: 98.2 F | SYSTOLIC BLOOD PRESSURE: 128 MMHG

## 2019-07-12 PROBLEM — N17.9 ACUTE KIDNEY INJURY SUPERIMPOSED ON CHRONIC KIDNEY DISEASE (HCC): Status: RESOLVED | Noted: 2019-04-09 | Resolved: 2019-07-12

## 2019-07-12 PROBLEM — N18.9 ACUTE KIDNEY INJURY SUPERIMPOSED ON CHRONIC KIDNEY DISEASE (HCC): Status: RESOLVED | Noted: 2019-04-09 | Resolved: 2019-07-12

## 2019-07-12 PROBLEM — E87.1 HYPONATREMIA: Status: RESOLVED | Noted: 2019-07-11 | Resolved: 2019-07-12

## 2019-07-12 LAB
ANION GAP SERPL CALCULATED.3IONS-SCNC: 10 MMOL/L (ref 4–13)
BUN SERPL-MCNC: 33 MG/DL (ref 5–25)
CALCIUM SERPL-MCNC: 7.9 MG/DL (ref 8.3–10.1)
CHLORIDE SERPL-SCNC: 100 MMOL/L (ref 100–108)
CO2 SERPL-SCNC: 21 MMOL/L (ref 21–32)
CREAT SERPL-MCNC: 1.24 MG/DL (ref 0.6–1.3)
ERYTHROCYTE [DISTWIDTH] IN BLOOD BY AUTOMATED COUNT: 14.9 % (ref 11.6–15.1)
GFR SERPL CREATININE-BSD FRML MDRD: 43 ML/MIN/1.73SQ M
GLUCOSE SERPL-MCNC: 97 MG/DL (ref 65–140)
HCT VFR BLD AUTO: 27.1 % (ref 34.8–46.1)
HGB BLD-MCNC: 9.3 G/DL (ref 11.5–15.4)
MCH RBC QN AUTO: 31.1 PG (ref 26.8–34.3)
MCHC RBC AUTO-ENTMCNC: 34.3 G/DL (ref 31.4–37.4)
MCV RBC AUTO: 91 FL (ref 82–98)
PLATELET # BLD AUTO: 154 THOUSANDS/UL (ref 149–390)
PMV BLD AUTO: 11 FL (ref 8.9–12.7)
POTASSIUM SERPL-SCNC: 3.8 MMOL/L (ref 3.5–5.3)
RBC # BLD AUTO: 2.99 MILLION/UL (ref 3.81–5.12)
SODIUM SERPL-SCNC: 131 MMOL/L (ref 136–145)
WBC # BLD AUTO: 6.46 THOUSAND/UL (ref 4.31–10.16)

## 2019-07-12 PROCEDURE — 85027 COMPLETE CBC AUTOMATED: CPT | Performed by: GENERAL PRACTICE

## 2019-07-12 PROCEDURE — NC001 PR NO CHARGE: Performed by: PHYSICIAN ASSISTANT

## 2019-07-12 PROCEDURE — 80048 BASIC METABOLIC PNL TOTAL CA: CPT | Performed by: GENERAL PRACTICE

## 2019-07-12 RX ADMIN — OXYCODONE HYDROCHLORIDE AND ACETAMINOPHEN 500 MG: 500 TABLET ORAL at 09:09

## 2019-07-12 RX ADMIN — FOLIC ACID 1 MG: 1 TABLET ORAL at 09:10

## 2019-07-12 RX ADMIN — SENNOSIDES 8.6 MG: 8.6 TABLET, FILM COATED ORAL at 09:09

## 2019-07-12 RX ADMIN — DOCUSATE SODIUM 100 MG: 100 CAPSULE, LIQUID FILLED ORAL at 09:09

## 2019-07-12 RX ADMIN — GABAPENTIN 100 MG: 100 CAPSULE ORAL at 05:35

## 2019-07-12 RX ADMIN — ACETAMINOPHEN 650 MG: 325 TABLET, FILM COATED ORAL at 09:08

## 2019-07-12 RX ADMIN — NYSTATIN 1 APPLICATION: 100000 POWDER TOPICAL at 09:12

## 2019-07-12 RX ADMIN — CITALOPRAM HYDROBROMIDE 20 MG: 20 TABLET ORAL at 09:09

## 2019-07-12 RX ADMIN — PANTOPRAZOLE SODIUM 40 MG: 40 TABLET, DELAYED RELEASE ORAL at 09:09

## 2019-07-12 RX ADMIN — HEPARIN SODIUM 5000 UNITS: 5000 INJECTION INTRAVENOUS; SUBCUTANEOUS at 05:35

## 2019-07-12 NOTE — PLAN OF CARE
Problem: Prexisting or High Potential for Compromised Skin Integrity  Goal: Skin integrity is maintained or improved  Description  INTERVENTIONS:  - Identify patients at risk for skin breakdown  - Assess and monitor skin integrity  - Assess and monitor nutrition and hydration status  - Monitor labs (i e  albumin)  - Assess for incontinence   - Turn and reposition patient  - Assist with mobility/ambulation  - Relieve pressure over bony prominences  - Avoid friction and shearing  - Provide appropriate hygiene as needed including keeping skin clean and dry  - Evaluate need for skin moisturizer/barrier cream  - Collaborate with interdisciplinary team (i e  Nutrition, Rehabilitation, etc )   - Patient/family teaching  Outcome: Adequate for Discharge     Problem: Potential for Falls  Goal: Patient will remain free of falls  Description  INTERVENTIONS:  - Assess patient frequently for physical needs  -  Identify cognitive and physical deficits and behaviors that affect risk of falls    -  [place] fall precautions as indicated by assessment   - Educate patient/family on patient safety including physical limitations  - Instruct patient to call for assistance with activity based on assessment  - Modify environment to reduce risk of injury  - Consider OT/PT consult to assist with strengthening/mobility  Outcome: Adequate for Discharge

## 2019-07-15 ENCOUNTER — TELEPHONE (OUTPATIENT)
Dept: OBGYN CLINIC | Facility: HOSPITAL | Age: 74
End: 2019-07-15

## 2019-07-15 ENCOUNTER — TRANSITIONAL CARE MANAGEMENT (OUTPATIENT)
Dept: FAMILY MEDICINE CLINIC | Facility: CLINIC | Age: 74
End: 2019-07-15

## 2019-07-15 NOTE — DISCHARGE SUMMARY
ORTHOPEDICS DISCHARGE SUMMARY   Rodri Russ 69 y o  female MRN: [id number]  Unit/Bed#:       Attending Physician: Dr Angelika Rowan    Admitting diagnosis: Primary osteoarthritis of one hip, right [M16 11]    Discharge diagnosis: Primary osteoarthritis of one hip, right [M16 11]    Date of admission: 7/8/2019    Date of discharge: 7/12/2019    Procedure: right total hip arthroplasty    HPI  This is a 69y o  year old female that presented to the office with signs and symptoms of right hip osteoarthritis  They tried and failed conservative treatment measures and wished to proceed with surgical intervention  The risks, benefits, and complications of the procedure were discussed with the patient and informed consent was obtained  Hospital Course: The patient was admitted to the hospital on 7/8/2019 and underwent an uncomplicated right total hip arthroplasty  They were transferred to the floor after a brief stay in the post-anesthesia care unit  Their pain was well managed with IV and oral pain medications  They began therapy on post operative day #1  Heparin was also started for DVT prophylaxis post operative day #1  She was given a total of 2 units PRBC during admission for ABLA  On discharge date pt was cleared by PT and the medicine team and determined to be safe for discharge  Daily discussion was had with the patient, nursing staff, orthopaedic team, and family members if present  All questions were answered to the patients satisifaction      0   Lab Value Date/Time    HGB 9 3 (L) 07/12/2019 0531    HGB 10 2 (L) 07/11/2019 1402    HGB 7 8 (L) 07/11/2019 0519    HGB 8 2 (L) 07/10/2019 0604    HGB 9 0 (L) 07/09/2019 1447    HGB 7 9 (L) 07/09/2019 0543    HGB 8 4 (L) 07/08/2019 2314    HGB 12 1 06/21/2019 1213    HGB 11 9 05/20/2019 1046    HGB 12 2 05/06/2019 0845    HGB 11 1 (L) 04/03/2019 0918    HGB 12 2 03/07/2019 0952    HGB 11 9 06/10/2017 1010    HGB 12 3 03/23/2017 1016    HGB 12 2 02/12/2016 1349 HGB 12 6 02/01/2014 1605       Greater than 2 gram drop which qualifies for diagnosis of acute blood loss anemia  She was given a total of 2 units PRBC during admission  Body mass index is 34 47 kg/m²  moderately obese  Recommend nutrition and physical activity  Discharge Instructions: The patient was discharged weight bearing as tolerated to the right lower extremity  Heparin will be continued for 6 weeks  Continue PT/OT  Take pain medications as instructed  Discharge Medications: For the complete list of discharge medications, please refer to the patient's medication reconciliation

## 2019-07-23 ENCOUNTER — OFFICE VISIT (OUTPATIENT)
Dept: OBGYN CLINIC | Facility: CLINIC | Age: 74
End: 2019-07-23

## 2019-07-23 ENCOUNTER — TELEPHONE (OUTPATIENT)
Dept: OBGYN CLINIC | Facility: HOSPITAL | Age: 74
End: 2019-07-23

## 2019-07-23 VITALS
HEIGHT: 64 IN | SYSTOLIC BLOOD PRESSURE: 138 MMHG | BODY MASS INDEX: 34.83 KG/M2 | WEIGHT: 204 LBS | DIASTOLIC BLOOD PRESSURE: 60 MMHG

## 2019-07-23 DIAGNOSIS — M16.11 PRIMARY OSTEOARTHRITIS OF RIGHT HIP: Primary | ICD-10-CM

## 2019-07-23 DIAGNOSIS — Z96.641 STATUS POST TOTAL REPLACEMENT OF RIGHT HIP: Primary | ICD-10-CM

## 2019-07-23 PROCEDURE — 99024 POSTOP FOLLOW-UP VISIT: CPT | Performed by: ORTHOPAEDIC SURGERY

## 2019-07-23 NOTE — TELEPHONE ENCOUNTER
Patient just left the office, and Aaron Heredia from [facility] received notification that patient should continue Heparin  Aaron Heredia wanted to advise that patient stopped Heparin, Dr Anup Zeng stopped that medication and put her on a 5day levonox and is done  Please contact Jie to advise      Diego Joshua [phone]

## 2019-07-23 NOTE — TELEPHONE ENCOUNTER
I spoke with Dr Kevin Bob regarding the patient's anticoagulation  Due to her hypercoagulable state, she does need to be on heparin for six weeks  He stated that he would restart this and understands why

## 2019-07-23 NOTE — PROGRESS NOTES
Assessment:     1  Status post total replacement of right hip          Plan:     Problem List Items Addressed This Visit     None      Visit Diagnoses     Status post total replacement of right hip    -  Primary            S/p right total hip arthroplasty 7/8/19, well healing incision, no signs of infection, no erythema  Post surgical tenderness, trochanteric bursa, continue with rehab at [facility] until discharged then can transition to formal physical therapy  Walking to tolerance, massage right hip work on desensitization of area  She will continue with heparin injections for another 4 weeks  See in 4 weeks with XR of right hip    Patient ID: Rodri Russ is a 69 y o  female  Chief Complaint:  S/p right total hip 7/8/19    Subjective:  69 yr old female s/p R TAMAR 7/8/19, initial post op visit  She is staying at [facility]  She is getting Heparin injections  She is getting rehab at [facility]  She has sharp shooting pain when she transitions from seated to standing position but goes away when she is walking  She is having diffuse pain over trochanteric bursa  She has no pain at rest  She is using walker to ambulate  Her pain is well controlled with medication  Denies numbness, tingling, well healing incision with no evidence of infection  Allergy:  Allergies   Allergen Reactions    Copper Rash    Meperidine Nausea Only     Other reaction(s): Nausea    Pt reports she is ok with Demerol now    Nickel Rash       Medications:  all current active meds have been reviewed    ROS:  Review of Systems   Constitutional: Negative for chills and fever  HENT: Negative for drooling and sneezing  Eyes: Negative for redness  Respiratory: Negative for cough and wheezing  Gastrointestinal: Negative for nausea and vomiting  Psychiatric/Behavioral: The patient is not nervous/anxious          Objective:  BP Readings from Last 1 Encounters:   07/23/19 138/60      Wt Readings from Last 1 Encounters: 07/23/19 92 5 kg (204 lb)        Exam:   Physical Exam   Constitutional: She is oriented to person, place, and time  She appears well-developed and well-nourished  Eyes: Pupils are equal, round, and reactive to light  Pulmonary/Chest: Effort normal and breath sounds normal    Neurological: She is alert and oriented to person, place, and time  She has normal reflexes  Skin: Skin is warm and dry  Psychiatric: She has a normal mood and affect   Her behavior is normal  Judgment and thought content normal      Right Hip Exam     Tenderness   Right hip tenderness location: surgical tenderness, tenderness over trochaneric bursa     Other   Erythema: absent  Scars: present (well healing incision with no signs of infection)  Sensation: normal  Pulse: present    Comments:  Appropriate range of motion of right hip  No erythema, no signs of drainage  Sensation intact to light touch  Calf is soft to palpation   Difficulty with getting up out of a chair              Radiographs:  no images to review          Scribe Attestation    I,:   Isabel Martinez am acting as a scribe while in the presence of the attending physician :        I,:   Joya Handy MD personally performed the services described in this documentation    as scribed in my presence :

## 2019-08-05 ENCOUNTER — HOSPITAL ENCOUNTER (EMERGENCY)
Facility: HOSPITAL | Age: 74
Discharge: HOME | End: 2019-08-05
Attending: EMERGENCY MEDICINE | Admitting: EMERGENCY MEDICINE
Payer: MEDICARE

## 2019-08-05 VITALS
WEIGHT: 210 LBS | HEIGHT: 64 IN | HEART RATE: 54 BPM | DIASTOLIC BLOOD PRESSURE: 57 MMHG | OXYGEN SATURATION: 100 % | BODY MASS INDEX: 35.85 KG/M2 | TEMPERATURE: 97.1 F | SYSTOLIC BLOOD PRESSURE: 132 MMHG | RESPIRATION RATE: 20 BRPM

## 2019-08-05 DIAGNOSIS — L25.9 CONTACT DERMATITIS: Primary | ICD-10-CM

## 2019-08-05 DIAGNOSIS — L30.9 ECZEMA: ICD-10-CM

## 2019-08-05 LAB
ALBUMIN SERPL BCP-MCNC: 2.7 G/DL (ref 3.5–5)
ALP SERPL-CCNC: 105 U/L (ref 46–116)
ALT SERPL W P-5'-P-CCNC: 14 U/L (ref 12–78)
ANION GAP BLD CALC-SCNC: 14 MMOL/L (ref 4–13)
ANION GAP SERPL CALCULATED.3IONS-SCNC: 9 MMOL/L (ref 4–13)
APTT PPP: 61 SECONDS (ref 23–37)
AST SERPL W P-5'-P-CCNC: 33 U/L (ref 5–45)
BASOPHILS # BLD AUTO: 0.05 THOUSANDS/ΜL (ref 0–0.1)
BASOPHILS NFR BLD AUTO: 1 % (ref 0–1)
BILIRUB SERPL-MCNC: 0.4 MG/DL (ref 0.2–1)
BUN BLD-MCNC: 30 MG/DL (ref 5–25)
BUN SERPL-MCNC: 29 MG/DL (ref 5–25)
CA-I BLD-SCNC: 1.22 MMOL/L (ref 1.12–1.32)
CALCIUM SERPL-MCNC: 8.6 MG/DL (ref 8.3–10.1)
CHLORIDE BLD-SCNC: 102 MMOL/L (ref 100–108)
CHLORIDE SERPL-SCNC: 103 MMOL/L (ref 100–108)
CO2 SERPL-SCNC: 25 MMOL/L (ref 21–32)
CREAT BLD-MCNC: 1.4 MG/DL (ref 0.6–1.3)
CREAT SERPL-MCNC: 1.35 MG/DL (ref 0.6–1.3)
EOSINOPHIL # BLD AUTO: 0.21 THOUSAND/ΜL (ref 0–0.61)
EOSINOPHIL NFR BLD AUTO: 4 % (ref 0–6)
ERYTHROCYTE [DISTWIDTH] IN BLOOD BY AUTOMATED COUNT: 14.6 % (ref 11.6–15.1)
GFR SERPL CREATININE-BSD FRML MDRD: 37 ML/MIN/1.73SQ M
GFR SERPL CREATININE-BSD FRML MDRD: 39 ML/MIN/1.73SQ M
GLUCOSE SERPL-MCNC: 88 MG/DL (ref 65–140)
GLUCOSE SERPL-MCNC: 90 MG/DL (ref 65–140)
HCT VFR BLD AUTO: 29.8 % (ref 34.8–46.1)
HCT VFR BLD CALC: 30 % (ref 34.8–46.1)
HGB BLD-MCNC: 9.4 G/DL (ref 11.5–15.4)
HGB BLDA-MCNC: 10.2 G/DL (ref 11.5–15.4)
IMM GRANULOCYTES # BLD AUTO: 0.03 THOUSAND/UL (ref 0–0.2)
IMM GRANULOCYTES NFR BLD AUTO: 1 % (ref 0–2)
INR PPP: 1.15 (ref 0.84–1.19)
LYMPHOCYTES # BLD AUTO: 0.97 THOUSANDS/ΜL (ref 0.6–4.47)
LYMPHOCYTES NFR BLD AUTO: 19 % (ref 14–44)
MCH RBC QN AUTO: 31.1 PG (ref 26.8–34.3)
MCHC RBC AUTO-ENTMCNC: 31.5 G/DL (ref 31.4–37.4)
MCV RBC AUTO: 99 FL (ref 82–98)
MONOCYTES # BLD AUTO: 0.45 THOUSAND/ΜL (ref 0.17–1.22)
MONOCYTES NFR BLD AUTO: 9 % (ref 4–12)
NEUTROPHILS # BLD AUTO: 3.43 THOUSANDS/ΜL (ref 1.85–7.62)
NEUTS SEG NFR BLD AUTO: 66 % (ref 43–75)
NRBC BLD AUTO-RTO: 0 /100 WBCS
PCO2 BLD: 26 MMOL/L (ref 21–32)
PLATELET # BLD AUTO: 215 THOUSANDS/UL (ref 149–390)
PMV BLD AUTO: 10.3 FL (ref 8.9–12.7)
POTASSIUM BLD-SCNC: 4.9 MMOL/L (ref 3.5–5.3)
POTASSIUM SERPL-SCNC: 5.5 MMOL/L (ref 3.5–5.3)
PROT SERPL-MCNC: 6.2 G/DL (ref 6.4–8.2)
PROTHROMBIN TIME: 14.4 SECONDS (ref 11.6–14.5)
RBC # BLD AUTO: 3.02 MILLION/UL (ref 3.81–5.12)
SODIUM BLD-SCNC: 137 MMOL/L (ref 136–145)
SODIUM SERPL-SCNC: 137 MMOL/L (ref 136–145)
SPECIMEN SOURCE: ABNORMAL
WBC # BLD AUTO: 5.14 THOUSAND/UL (ref 4.31–10.16)

## 2019-08-05 PROCEDURE — 36415 COLL VENOUS BLD VENIPUNCTURE: CPT | Performed by: PHYSICIAN ASSISTANT

## 2019-08-05 PROCEDURE — 85014 HEMATOCRIT: CPT

## 2019-08-05 PROCEDURE — 80047 BASIC METABLC PNL IONIZED CA: CPT

## 2019-08-05 PROCEDURE — 85730 THROMBOPLASTIN TIME PARTIAL: CPT | Performed by: PHYSICIAN ASSISTANT

## 2019-08-05 PROCEDURE — 85025 COMPLETE CBC W/AUTO DIFF WBC: CPT | Performed by: PHYSICIAN ASSISTANT

## 2019-08-05 PROCEDURE — 85610 PROTHROMBIN TIME: CPT | Performed by: PHYSICIAN ASSISTANT

## 2019-08-05 PROCEDURE — 80053 COMPREHEN METABOLIC PANEL: CPT | Performed by: PHYSICIAN ASSISTANT

## 2019-08-05 PROCEDURE — 99283 EMERGENCY DEPT VISIT LOW MDM: CPT | Performed by: PHYSICIAN ASSISTANT

## 2019-08-05 PROCEDURE — 99283 EMERGENCY DEPT VISIT LOW MDM: CPT

## 2019-08-05 RX ORDER — GABAPENTIN 100 MG/1
100 CAPSULE ORAL 2 TIMES DAILY
COMMUNITY
End: 2019-08-12 | Stop reason: SDUPTHER

## 2019-08-05 RX ORDER — FUROSEMIDE 20 MG/1
20 TABLET ORAL 2 TIMES DAILY
COMMUNITY
End: 2019-08-20 | Stop reason: SDUPTHER

## 2019-08-05 NOTE — ED NOTES
Patient yelling from her room for the nurse  Patient voided on the bedpan  Patient accused us of taking her cell phone out of the case    I explained she took it out when she called her daughter for her walker     Alix Dawkins RN  08/05/19 0901

## 2019-08-05 NOTE — DISCHARGE INSTRUCTIONS
Take a shower every other days  Apply thick moisturizer like lubriderm, gold bond or cetaphil  Use a gentle soap  Continue with the steroid cream to legs  Follow up with family doctor in 1 week for recheck

## 2019-08-05 NOTE — ED NOTES
Patient has multiple open areas on both lower extremities from the knee down     Loral Isaak, RN  08/05/19 [id number]

## 2019-08-06 RX ORDER — HEPARIN SODIUM 5000 [USP'U]/ML
INJECTION, SOLUTION INTRAVENOUS; SUBCUTANEOUS
Qty: 21 ML | Refills: 0 | Status: SHIPPED | OUTPATIENT
Start: 2019-08-06 | End: 2019-08-27

## 2019-08-06 NOTE — TELEPHONE ENCOUNTER
Patient called back and she apologizes but she was mixed up and [facility] was [facility] get her heparin and they were unable to provide it  I called [facility] [address] and they can provide 10,000 u/1mil vials and they can also provide syringes but would need the specifications on type of syringe in RX   25 gauge needle 1ml syringe? 10,000u/1ml vial, injected 0 5ml every 8 hrs until 8/20/19? She has enough for 3 days she says so she will need # 17 syringes 1 5 per day     Please advise

## 2019-08-06 NOTE — TELEPHONE ENCOUNTER
Thank you  I spoke with patient  She is scheduled to pick it up at a different pharmacy tomorrow and will call us if there are any issues  Nothing to do

## 2019-08-06 NOTE — TELEPHONE ENCOUNTER
I spoke with Pharmacist at [address] on file and they state that RX for Heparin 5000u TID cannot be given as it is on back order  They can give 34607 u vial and then patient's daughter would need to draw up 0 5ml TID  Please advise if an order can be placed for this? I just got off the phone with Yayo who now says the the is unable to get Heparin 10,000 u vial either    Please advise

## 2019-08-06 NOTE — TELEPHONE ENCOUNTER
I sent prescription to the pharmacy with new directions  Let me know if there are any issues  Thanks

## 2019-08-06 NOTE — TELEPHONE ENCOUNTER
I let patient know that the RX went to  [facility] in [place] and contact number given  [facility] with [facility] and they asked for a verbal on the Heparin Sodium, 10,000unit/ML as they can not take it how it is with 5000u/ml  I okayed with verbal order for 10,000u /ml vial,   05ml every 8 hrs  #42 syringes

## 2019-08-07 ENCOUNTER — TELEPHONE (OUTPATIENT)
Dept: HEMATOLOGY ONCOLOGY | Facility: CLINIC | Age: 74
End: 2019-08-07

## 2019-08-07 NOTE — TELEPHONE ENCOUNTER
Kaden called to cx her 10/9/19 appt stated she is  Eva Waggoner to see a dr in [facility] closer to home

## 2019-08-09 ENCOUNTER — VBI (OUTPATIENT)
Dept: ADMINISTRATIVE | Facility: OTHER | Age: 74
End: 2019-08-09

## 2019-08-09 NOTE — TELEPHONE ENCOUNTER
[facility] Edmond[facility]    ED Visit Information     Ed visit date: 8/5/2019  Diagnosis Description: Contact dermatitis [B/L Lower extremities] ; Eczema [B/L Lower extremities ]   In Network? Yes [facility]  Discharge status: Home  Discharged with meds ? No  Number of ED visits to date: 2  ED Severity:n/a     Outreach Information    Outreach successful: Yes 1  Date letter mailed:n/a  Date [address] Coordination    Follow up appointment with pcp: no No ED f/u appt scheduled  Transportation issues ? No    [employer] type:  7 Day Outreach  Emergent necessity warranted by diagnosis:  No  [facility] PCP:  Yes  Transportation:  Friend/Family Transport  Called PCP first?:  No  Practice Contacted Patient ?:  No  Pt had ED follow up with pcp/staff ?:  No    Seen for follow-up out of network ?:  No  Reason Patient went to ED instead of Urgent Care or PCP?:  Perceived Severity of Illness  Urgent care Education?:  No  08/09/2019 03:27 PM Phone (Lillyclover Lui) Eimlee Board (Self) [phone] (H)   Call Complete  Personal communication with patient regarding her recent ED visit on 8/5 for Contact dermatitis [B/L Lower extremities] ; Eczema [B/L Lower extremities  ]  Patient was discharged without medication and advised to follow up within 1 week of visit  Ching Tierney stated that she had arthroplasty right hip on 7/8/2019 and had spent 3 weeks at [address] [place] [address] for rehab  She stated that she developed a rash on her left leg that is now spreading to her right leg  While at [facility] she was given Betamehasone by Dr Michelle Zhang but she stated that it does not seem to be helping  She wishes to make an appt with her PCP asap  She is very concerned that rash is spreading  She also stated that she was very unhappy with her ED visit  Patient rushed off phone  I was not able to ask follow up questions  Message has been sent to Kim Anderson 42

## 2019-08-12 DIAGNOSIS — M47.816 LUMBAR SPONDYLOSIS: Primary | ICD-10-CM

## 2019-08-12 RX ORDER — GABAPENTIN 300 MG/1
300 CAPSULE ORAL 3 TIMES DAILY
Qty: 90 CAPSULE | Refills: 0 | Status: SHIPPED | OUTPATIENT
Start: 2019-08-12 | End: 2019-11-07 | Stop reason: SDUPTHER

## 2019-08-12 NOTE — TELEPHONE ENCOUNTER
No bloodwork to be done  Heparin is not for the rash  It is to thin her blood  If she is still at the rehab facility then hopefully the doctor there has seen the rash and is treating it  Can you call her to make sure the doctor there has evaluated the rash?

## 2019-08-12 NOTE — TELEPHONE ENCOUNTER
Patient has an appt with Dr Juan Bueno on Tues 08/20/19  Patient wants to make you aware she still has the rash left leg, it has spread on the right leg from contact dermatitis at [facility]  She wants to make you aware she is taking Heparin for the rash  Patient wants to know if you are planning to have any additional blood work done, she would like it done before her appt with you, because transportation is tough to arrange  Please advise      Callback #[phone]

## 2019-08-12 NOTE — TELEPHONE ENCOUNTER
Called PT and left her know that 1 month supply of the gabapentin was sent in - she does have an appt with Dr Zackery Gutierrez - and then she said that she will follow up with Dr Maria C Islas

## 2019-08-20 ENCOUNTER — OFFICE VISIT (OUTPATIENT)
Dept: FAMILY MEDICINE CLINIC | Facility: CLINIC | Age: 74
End: 2019-08-20
Payer: MEDICARE

## 2019-08-20 ENCOUNTER — OFFICE VISIT (OUTPATIENT)
Dept: OBGYN CLINIC | Facility: CLINIC | Age: 74
End: 2019-08-20

## 2019-08-20 ENCOUNTER — APPOINTMENT (OUTPATIENT)
Dept: RADIOLOGY | Facility: CLINIC | Age: 74
End: 2019-08-20
Payer: MEDICARE

## 2019-08-20 VITALS
DIASTOLIC BLOOD PRESSURE: 66 MMHG | WEIGHT: 209 LBS | BODY MASS INDEX: 35.68 KG/M2 | TEMPERATURE: 99.5 F | OXYGEN SATURATION: 94 % | HEIGHT: 64 IN | SYSTOLIC BLOOD PRESSURE: 128 MMHG | HEART RATE: 80 BPM

## 2019-08-20 VITALS — HEIGHT: 64 IN | WEIGHT: 210.6 LBS | BODY MASS INDEX: 35.96 KG/M2

## 2019-08-20 DIAGNOSIS — F32.A DEPRESSION, UNSPECIFIED DEPRESSION TYPE: ICD-10-CM

## 2019-08-20 DIAGNOSIS — Z79.899 ENCOUNTER FOR LONG-TERM CURRENT USE OF MEDICATION: ICD-10-CM

## 2019-08-20 DIAGNOSIS — M25.473 ANKLE SWELLING, UNSPECIFIED LATERALITY: ICD-10-CM

## 2019-08-20 DIAGNOSIS — Z96.641 STATUS POST RIGHT HIP REPLACEMENT: Primary | ICD-10-CM

## 2019-08-20 DIAGNOSIS — M25.551 PAIN IN RIGHT HIP: ICD-10-CM

## 2019-08-20 DIAGNOSIS — I10 ESSENTIAL HYPERTENSION: ICD-10-CM

## 2019-08-20 DIAGNOSIS — R21 RASH AND NONSPECIFIC SKIN ERUPTION: Primary | ICD-10-CM

## 2019-08-20 PROBLEM — M16.11 PRIMARY OSTEOARTHRITIS OF ONE HIP, RIGHT: Status: RESOLVED | Noted: 2019-06-27 | Resolved: 2019-08-20

## 2019-08-20 PROCEDURE — 99213 OFFICE O/P EST LOW 20 MIN: CPT | Performed by: NURSE PRACTITIONER

## 2019-08-20 PROCEDURE — 73502 X-RAY EXAM HIP UNI 2-3 VIEWS: CPT

## 2019-08-20 PROCEDURE — 99024 POSTOP FOLLOW-UP VISIT: CPT | Performed by: ORTHOPAEDIC SURGERY

## 2019-08-20 RX ORDER — CITALOPRAM 10 MG/1
20 TABLET ORAL DAILY
Qty: 90 TABLET | Refills: 0 | Status: SHIPPED | OUTPATIENT
Start: 2019-08-20 | End: 2019-12-26 | Stop reason: SDUPTHER

## 2019-08-20 RX ORDER — FUROSEMIDE 20 MG/1
20 TABLET ORAL 2 TIMES DAILY
Qty: 60 TABLET | Refills: 1 | Status: SHIPPED | OUTPATIENT
Start: 2019-08-20 | End: 2019-11-19 | Stop reason: SDUPTHER

## 2019-08-20 RX ORDER — KETOCONAZOLE 20 MG/G
CREAM TOPICAL 2 TIMES DAILY
Qty: 15 G | Refills: 0 | Status: SHIPPED | OUTPATIENT
Start: 2019-08-20 | End: 2019-08-27 | Stop reason: SDUPTHER

## 2019-08-20 RX ORDER — MOMETASONE FUROATE 1 MG/G
CREAM TOPICAL 2 TIMES DAILY
Qty: 45 G | Refills: 0 | Status: SHIPPED | OUTPATIENT
Start: 2019-08-20 | End: 2020-02-19 | Stop reason: ALTCHOICE

## 2019-08-20 RX ORDER — PANTOPRAZOLE SODIUM 40 MG/1
40 TABLET, DELAYED RELEASE ORAL DAILY
Qty: 30 TABLET | Refills: 3 | Status: SHIPPED | OUTPATIENT
Start: 2019-08-20 | End: 2019-12-26 | Stop reason: SDUPTHER

## 2019-08-20 RX ORDER — HEPARIN SODIUM 10000 [USP'U]/ML
INJECTION, SOLUTION INTRAVENOUS; SUBCUTANEOUS
Refills: 0 | COMMUNITY
Start: 2019-08-06 | End: 2019-08-27

## 2019-08-20 RX ORDER — LISINOPRIL 20 MG/1
20 TABLET ORAL DAILY
Qty: 90 TABLET | Refills: 1 | Status: SHIPPED | OUTPATIENT
Start: 2019-08-20 | End: 2020-02-27

## 2019-08-20 NOTE — PROGRESS NOTES
Assessment:     1  Status post right hip replacement          Plan:     Problem List Items Addressed This Visit        Other    Status post right hip replacement - Primary     80-year-old female status post right total hip arthroplasty  Overall she is doing well  She is following her hip precautions  She is having some rashes on her lower extremities and of recommended her getting in and addressing these with her primary care provider  She will follow up in a  Year for x-rays of her right hip  Patient ID: Rodri Russ is a 69 y o  female  Chief Complaint:  S/p right total hip 7/8/19    Subjective:  69 yr old female s/p R TAMAR 7/8/19  She is doing well  She is not having any pain or discomfort in her hip  She is continuing to have problems with rashes on her legs  She is also having some sharp heel pain in the left foot that has been going on for the last few days  She has been taking gabapentin which seems to be helping it  She describes the pain as a shooting pain when she puts weight on it  Allergy:  Allergies   Allergen Reactions    Copper Rash    Meperidine Nausea Only     Other reaction(s): Nausea    Pt reports she is ok with Demerol now    Nickel Rash       Medications:  all current active meds have been reviewed    ROS:  Review of Systems   Skin: Positive for rash and wound  All other systems reviewed and are negative  Objective:  BP Readings from Last 1 Encounters:   08/05/19 132/57      Wt Readings from Last 1 Encounters:   08/20/19 95 5 kg (210 lb 9 6 oz)        Exam:   Physical Exam   Constitutional: She is oriented to person, place, and time  She appears well-developed and well-nourished  Eyes: Pupils are equal, round, and reactive to light  Pulmonary/Chest: Effort normal and breath sounds normal    Neurological: She is alert and oriented to person, place, and time  She has normal reflexes  Skin: Skin is warm and dry     Psychiatric: She has a normal mood and affect  Her behavior is normal  Judgment and thought content normal      Right Hip Exam     Tenderness   Right hip tenderness location: surgical tenderness, tenderness over trochaneric bursa     Other   Erythema: absent  Scars: present (well healing incision with no signs of infection)  Sensation: normal  Pulse: present    Comments:  Patient is able to stand up and move around using her walker, no pain with internal and external rotation of the hip, incision is healing very well, she has healing wounds on her legs which appeared to be multiple blisters that formed on her legs  She has tenderness to palpation along her heel pad posteriorly of the left              Radiographs:  X-rays of the right hip are reviewed today    They show a total hip in good position with no evidence or loosening        Scribe Attestation    I,:    am acting as a scribe while in the presence of the attending physician :        I,:    personally performed the services described in this documentation    as scribed in my presence :

## 2019-08-20 NOTE — PATIENT INSTRUCTIONS
Apply creams to both legs as directed  Medications refilled and sent to pharmacy  Schedule appointment with Dr Tj Phillips next week for f/up on rash and medication check  Call/return with any problems or concerns

## 2019-08-20 NOTE — ASSESSMENT & PLAN NOTE
25-year-old female status post right total hip arthroplasty  Overall she is doing well  She is following her hip precautions  She is having some rashes on her lower extremities and of recommended her getting in and addressing these with her primary care provider  She will follow up in a  Year for x-rays of her right hip

## 2019-08-20 NOTE — PROGRESS NOTES
[facility]        NAME: Fany Mayo is a 69 y o  female  : 1945    MRN: [id number]  DATE: 2019  TIME: 4:05 PM    Assessment and Plan   Rash and nonspecific skin eruption [R21]  1  Rash and nonspecific skin eruption  mometasone (ELOCON) 0 1 % cream    ketoconazole (NIZORAL) 2 % cream   2  Encounter for long-term current use of medication  pantoprazole (PROTONIX) 40 mg tablet   3  Depression, unspecified depression type  citalopram (CeleXA) 10 mg tablet   4  Essential hypertension  lisinopril (ZESTRIL) 20 mg tablet   5  Ankle swelling, unspecified laterality  furosemide (LASIX) 20 mg tablet         Patient Instructions     Patient Instructions   Apply creams to both legs as directed  Medications refilled and sent to pharmacy  Schedule appointment with Dr Heraclio Lucero next week for f/up on rash and medication check  Call/return with any problems or concerns          Chief Complaint     Chief Complaint   Patient presents with    Rash     seen at ed not better          History of Present Illness        rash on legs for a few weeks  Was given a steroid cream  Cream did not help, made the rash worse  States she had pus from rash  She stopped using the steroid cream which made rash worse  Denies sob, denies chest pain  She needs refills on medications for Lasix, Celexa, Lisinopril, and Protonix  Review of Systems   Review of Systems   Constitutional: Negative for activity change and fever  Respiratory: Negative for chest tightness, shortness of breath and wheezing  Cardiovascular: Negative for chest pain and palpitations  Skin: Positive for rash (both legs)  Neurological: Negative for dizziness, weakness and numbness           Current Medications       Current Outpatient Medications:     B-D 3CC LUER-MIKA SYR 25GX5/8" 25G X 5/8" 3 ML MISC, INJECT 0 5ML OF HEPARIN EVERY 8 HOURS, Disp: , Rfl: 0    citalopram (CeleXA) 10 mg tablet, Take 2 tablets (20 mg total) by mouth daily, Disp: 90 tablet, Rfl: 0    furosemide (LASIX) 20 mg tablet, Take 1 tablet (20 mg total) by mouth 2 (two) times a day, Disp: 60 tablet, Rfl: 1    gabapentin (NEURONTIN) 300 mg capsule, Take 1 capsule (300 mg total) by mouth 3 (three) times a day, Disp: 90 capsule, Rfl: 0    Heparin Sodium, Porcine, (HEPARIN, PORCINE,) 96648 UNIT/ML, INJECT 0 5ML SUBCUTANEOUSLY EVERY 8 HOURS, Disp: , Rfl: 0    lisinopril (ZESTRIL) 20 mg tablet, Take 1 tablet (20 mg total) by mouth daily, Disp: 90 tablet, Rfl: 1    Multiple Vitamin (MULTI-VITAMIN) tablet, Take 1 tablet by mouth daily, Disp: 30 tablet, Rfl: 2    ascorbic acid (VITAMIN C) 500 mg tablet, Take 1 tablet (500 mg total) by mouth 2 (two) times a day (Patient not taking: Reported on 8/5/2019), Disp: 60 tablet, Rfl: 2    ferrous sulfate 324 (65 Fe) mg, Take 1 tablet (324 mg total) by mouth 2 (two) times a day before meals (Patient not taking: Reported on 8/5/2019), Disp: 60 tablet, Rfl: 2    folic acid (FOLVITE) 1 mg tablet, Take 1 tablet (1 mg total) by mouth daily (Patient not taking: Reported on 8/5/2019), Disp: 30 tablet, Rfl: 2    Heparin Sodium, Porcine, (HEPARIN, PORCINE,) 5,000 units/mL, Inject 1 mL (5,000 Units total) under the skin every 8 (eight) hours (Patient not taking: Reported on 8/20/2019), Disp: 120 mL, Rfl: 0    Heparin Sodium, Porcine, (HEPARIN, PORCINE,) 5,000 units/mL, 10,000 00 units per 1 ml  Inject 0 5 ml q 8 hours   She will need #21 (1ml) syringes and 25 gauge needles   (Patient not taking: Reported on 8/20/2019), Disp: 21 mL, Rfl: 0    indapamide (LOZOL) 2 5 mg tablet, Take 1 tablet (2 5 mg total) by mouth daily (Patient not taking: Reported on 8/20/2019), Disp: 90 tablet, Rfl: 1    ketoconazole (NIZORAL) 2 % cream, Apply topically 2 (two) times a day, Disp: 15 g, Rfl: 0    mometasone (ELOCON) 0 1 % cream, Apply topically 2 (two) times a day, Disp: 45 g, Rfl: 0    pantoprazole (PROTONIX) 40 mg tablet, Take 1 tablet (40 mg total) by mouth daily for 151 days, Disp: 30 tablet, Rfl: 3    Current Allergies     Allergies as of 08/20/2019 - Reviewed 08/20/2019   Allergen Reaction Noted    Copper Rash 04/02/2019    Meperidine Nausea Only 10/22/2013    Nickel Rash 04/02/2019            The following portions of the patient's history were reviewed and updated as appropriate: allergies, current medications, past family history, past medical history, past social history, past surgical history and problem list      Past Medical History:   Diagnosis Date    Anxiety     Asymptomatic gallstones     Chronic pain     Depression 10/22/2015    Hyperlipidemia     Hypertension     Primary localized osteoarthritis of right knee 2/6/2018    Psychiatric disorder     Spinal stenosis     Stage III chronic kidney disease ([facility] [address] ) 4/9/2019       Past Surgical History:   Procedure Laterality Date    APPENDECTOMY      COLONOSCOPY      NC COLONOSCOPY FLX DX W/COLLJ SPEC WHEN PFRMD N/A 3/13/2019    Procedure: COLONOSCOPY with biopsy;  Surgeon: Sheri Mcdonough MD;  Location: [facility];  Service: Gastroenterology    NC ESOPHAGOGASTRODUODENOSCOPY TRANSORAL DIAGNOSTIC N/A 3/27/2019    Procedure: ESOPHAGOGASTRODUODENOSCOPY (EGD)  with pediatric colonoscope with clotest and jejunum/duodenum bx r/o celiac;  Surgeon: Mik Figueroa MD;  Location: [facility];  Service: Gastroenterology    NC TOTAL HIP ARTHROPLASTY Right 7/8/2019    Procedure: ARTHROPLASTY HIP TOTAL;  Surgeon: Kacie Gill MD;  Location: [facility];  Service: Orthopedics    TOE SURGERY         Family History   Problem Relation Age of Onset    Breast cancer additional onset Mother     Diabetes Mother     No Known Problems Father     Alcohol abuse Neg Hx     Substance Abuse Neg Hx     Colon cancer Neg Hx     Colon polyps Neg Hx          Medications have been verified          Objective   /66 (BP Location: Left arm, Patient Position: Sitting, Cuff Size: Standard)   Pulse 80   Temp 99 5 °F (37 5 °C) (Tympanic)   Ht 5' 4" (1 626 m)   Wt 94 8 kg (209 lb)   LMP  (LMP Unknown)   SpO2 94%   BMI 35 87 kg/m²        Physical Exam     Physical Exam   Constitutional: She is oriented to person, place, and time  Vital signs are normal  She appears well-developed and well-nourished  She is active and cooperative  No distress  Eyes: EOM are normal    Cardiovascular: Normal rate, regular rhythm and normal heart sounds  No murmur heard  Pulmonary/Chest: Effort normal and breath sounds normal  No respiratory distress  She has no wheezes  Neurological: She is alert and oriented to person, place, and time  Skin: Skin is warm and dry  Rash (erythematous rash to b/l lower extremities  No infection or drainage noted  Skin is very dry    Dr Stas Stephen also assessed rash) noted  She is not diaphoretic  There is erythema  Psychiatric: She has a normal mood and affect  Her behavior is normal  Judgment and thought content normal    Nursing note and vitals reviewed

## 2019-08-27 ENCOUNTER — OFFICE VISIT (OUTPATIENT)
Dept: FAMILY MEDICINE CLINIC | Facility: CLINIC | Age: 74
End: 2019-08-27
Payer: MEDICARE

## 2019-08-27 VITALS
HEIGHT: 64 IN | OXYGEN SATURATION: 98 % | WEIGHT: 209 LBS | BODY MASS INDEX: 35.68 KG/M2 | TEMPERATURE: 98.3 F | HEART RATE: 65 BPM | SYSTOLIC BLOOD PRESSURE: 126 MMHG | DIASTOLIC BLOOD PRESSURE: 84 MMHG

## 2019-08-27 DIAGNOSIS — R21 RASH AND NONSPECIFIC SKIN ERUPTION: Primary | ICD-10-CM

## 2019-08-27 DIAGNOSIS — Z12.31 SCREENING MAMMOGRAM, ENCOUNTER FOR: ICD-10-CM

## 2019-08-27 PROCEDURE — 1123F ACP DISCUSS/DSCN MKR DOCD: CPT | Performed by: FAMILY MEDICINE

## 2019-08-27 PROCEDURE — 99213 OFFICE O/P EST LOW 20 MIN: CPT | Performed by: FAMILY MEDICINE

## 2019-08-27 RX ORDER — KETOCONAZOLE 20 MG/G
CREAM TOPICAL 2 TIMES DAILY
Qty: 60 G | Refills: 1 | Status: SHIPPED | OUTPATIENT
Start: 2019-08-27 | End: 2020-02-19 | Stop reason: ALTCHOICE

## 2019-08-27 NOTE — PROGRESS NOTES
[id number]         NAME: Jimmy Mathew is a 69 y o  female  : 1945    MRN: [id number]  DATE: 2019  TIME: 3:01 PM    Assessment and Plan   Rash and nonspecific skin eruption [R21]  1  Rash and nonspecific skin eruption  ketoconazole (NIZORAL) 2 % cream       No problem-specific Assessment & Plan notes found for this encounter  Patient Instructions     There are no Patient Instructions on file for this visit  Chief Complaint     Chief Complaint   Patient presents with    Follow-up     rash          History of Present Illness       Patient here for follow-up on rash of lower extremities  There has been improvement  There is less swelling and less redness  She does not feel pain however is on gabapentin  Review of Systems   Review of Systems   Constitutional: Negative for chills, diaphoresis and fever  Respiratory: Negative for cough, shortness of breath and wheezing  Cardiovascular: Positive for leg swelling  Negative for chest pain and palpitations  Musculoskeletal: Positive for arthralgias  Negative for myalgias  Skin: Positive for rash           Current Medications       Current Outpatient Medications:     ascorbic acid (VITAMIN C) 500 mg tablet, Take 1 tablet (500 mg total) by mouth 2 (two) times a day (Patient not taking: Reported on 2019), Disp: 60 tablet, Rfl: 2    B-D 3CC LUER-MIKA SYR 25GX5/8" 25G X 5/8" 3 ML MISC, INJECT 0 5ML OF HEPARIN EVERY 8 HOURS, Disp: , Rfl: 0    citalopram (CeleXA) 10 mg tablet, Take 2 tablets (20 mg total) by mouth daily, Disp: 90 tablet, Rfl: 0    ferrous sulfate 324 (65 Fe) mg, Take 1 tablet (324 mg total) by mouth 2 (two) times a day before meals (Patient not taking: Reported on 2019), Disp: 60 tablet, Rfl: 2    folic acid (FOLVITE) 1 mg tablet, Take 1 tablet (1 mg total) by mouth daily (Patient not taking: Reported on 2019), Disp: 30 tablet, Rfl: 2    furosemide (LASIX) 20 mg tablet, Take 1 tablet (20 mg total) by mouth 2 (two) times a day, Disp: 60 tablet, Rfl: 1    gabapentin (NEURONTIN) 300 mg capsule, Take 1 capsule (300 mg total) by mouth 3 (three) times a day, Disp: 90 capsule, Rfl: 0    indapamide (LOZOL) 2 5 mg tablet, Take 1 tablet (2 5 mg total) by mouth daily (Patient not taking: Reported on 8/20/2019), Disp: 90 tablet, Rfl: 1    ketoconazole (NIZORAL) 2 % cream, Apply topically 2 (two) times a day, Disp: 60 g, Rfl: 1    lisinopril (ZESTRIL) 20 mg tablet, Take 1 tablet (20 mg total) by mouth daily, Disp: 90 tablet, Rfl: 1    mometasone (ELOCON) 0 1 % cream, Apply topically 2 (two) times a day, Disp: 45 g, Rfl: 0    Multiple Vitamin (MULTI-VITAMIN) tablet, Take 1 tablet by mouth daily, Disp: 30 tablet, Rfl: 2    pantoprazole (PROTONIX) 40 mg tablet, Take 1 tablet (40 mg total) by mouth daily for 151 days, Disp: 30 tablet, Rfl: 3    Current Allergies     Allergies as of 08/27/2019 - Reviewed 08/27/2019   Allergen Reaction Noted    Copper Rash 04/02/2019    Meperidine Nausea Only 10/22/2013    Nickel Rash 04/02/2019            The following portions of the patient's history were reviewed and updated as appropriate: allergies, current medications, past family history, past medical history, past social history, past surgical history and problem list      Past Medical History:   Diagnosis Date    Anxiety     Asymptomatic gallstones     Chronic pain     Depression 10/22/2015    Hyperlipidemia     Hypertension     Primary localized osteoarthritis of right knee 2/6/2018    Psychiatric disorder     Spinal stenosis     Stage III chronic kidney disease ([facility], [place] [address] ) 4/9/2019       Past Surgical History:   Procedure Laterality Date    APPENDECTOMY      COLONOSCOPY      AR COLONOSCOPY FLX DX W/COLLJ SPEC WHEN PFRMD N/A 3/13/2019    Procedure: COLONOSCOPY with biopsy;  Surgeon: Maritza Buchanan MD;  Location:  MAIN OR;  Service: Gastroenterology    AR ESOPHAGOGASTRODUODENOSCOPY TRANSORAL DIAGNOSTIC N/A 3/27/2019    Procedure: ESOPHAGOGASTRODUODENOSCOPY (EGD)  with pediatric colonoscope with clotest and jejunum/duodenum bx r/o celiac;  Surgeon: Salina Álvarez MD;  Location: [facility];  Service: Gastroenterology    HI TOTAL HIP ARTHROPLASTY Right 7/8/2019    Procedure: ARTHROPLASTY HIP TOTAL;  Surgeon: Scarlett Salas MD;  Location: [facility];  Service: Orthopedics    TOE SURGERY         Family History   Problem Relation Age of Onset    Breast cancer additional onset Mother     Diabetes Mother     No Known Problems Father     Alcohol abuse Neg Hx     Substance Abuse Neg Hx     Colon cancer Neg Hx     Colon polyps Neg Hx          Medications have been verified  Objective   /84   Pulse 65   Temp 98 3 °F (36 8 °C)   Ht 5' 4" (1 626 m)   Wt 94 8 kg (209 lb)   LMP  (LMP Unknown)   SpO2 98%   BMI 35 87 kg/m²        Physical Exam     Physical Exam   Constitutional: She appears well-developed  No distress  Cardiovascular: Normal rate, regular rhythm and normal heart sounds  Pulmonary/Chest: Effort normal and breath sounds normal  No respiratory distress  Skin: Rash noted  There is erythema  Lower extremities-the rash generally looks improved from previous visit 1 week ago  There is less erythema and last tenderness  Only minimal swelling is present  There is still scaling present

## 2019-08-27 NOTE — PATIENT INSTRUCTIONS
Continue current medications  I would use each 2 times daily approximately 1 hour apart  After 5 days you may decrease the mometasone cream(steroid) to once daily  Continue the other 1 twice daily for approximately 2-3 weeks if the rash is persisting

## 2019-09-19 NOTE — UTILIZATION REVIEW
Initial Clinical Review    Elective IP surgical procedure    Age/Sex: 69 y o  female     Surgery Date: 7/8/19    Procedure:   Pre-op Diagnosis:   Primary osteoarthritis of right hip     Post-op Diagnosis:  Same     Procedure:  ARTHROPLASTY RIGHT HIP- TOTAL    Anesthesia: GENERAL     Admission Orders: Date/Time/Statement: 7/8/19 @ 1140   Orders Placed This Encounter   Procedures    Inpatient Admission     Standing Status:   Standing     Number of Occurrences:   1     Order Specific Question:   Admitting Physician     Answer:   Iraida Samuel [[id number]]     Order Specific Question:   Level of Care     Answer:   Med Surg [[id number]]     Order Specific Question:   Estimated length of stay     Answer:   More than 2 Midnights     Order Specific Question:   Certification     Answer:   I certify that inpatient services are medically necessary for this patient for a duration of greater than two midnights  See H&P and MD Progress Notes for additional information about the patient's course of treatment  Vital Signs: /56 (BP Location: Right arm)   Pulse 81   Temp 97 8 °F (36 6 °C) (Oral)   Resp 20   Ht 5' 4" (1 626 m)   Wt 91 1 kg (200 lb 12 8 oz)   LMP  (LMP Unknown)   SpO2 94%   BMI 34 47 kg/m²      Diet: REGULAR     Mobility: WBAT WITH ASSISTIVE DEVICE     DVT Prophylaxis: SCDs, HEPARIN SQ Q 8 HR     Pain Control:   PRN Meds:   Acetaminophen X1 7/8    aluminum-magnesium hydroxide-simethicone    bisacodyl    calcium carbonate    morphine injection    Ondansetron X1 7/8    oxyCODONE    oxyCODONE    simethicone      Network Utilization Review Department  Phone: [phone]; Fax [phone]  [email or web address]  ATTENTION: Please call with any questions or concerns to [phone]  and carefully listen to the prompts so that you are directed to the right person     Send all requests for admission clinical reviews, approved or denied determinations and any other requests to fax [phone]   All voicemails are confidential  Elidel Pregnancy And Lactation Text: This medication is Pregnancy Category C. It is unknown if this medication is excreted in breast milk. Elidel Counseling: Patient may experience a mild burning sensation during topical application. Elidel is not approved in children less than 2 years of age. There have been case reports of hematologic and skin malignancies in patients using topical calcineurin inhibitors although causality is questionable. Azithromycin Counseling:  I discussed with the patient the risks of azithromycin including but not limited to GI upset, allergic reaction, drug rash, diarrhea, and yeast infections. Fluconazole Counseling:  Patient counseled regarding adverse effects of fluconazole including but not limited to headache, diarrhea, nausea, upset stomach, liver function test abnormalities, taste disturbance, and stomach pain.  There is a rare possibility of liver failure that can occur when taking fluconazole.  The patient understands that monitoring of LFTs and kidney function test may be required, especially at baseline. The patient verbalized understanding of the proper use and possible adverse effects of fluconazole.  All of the patient's questions and concerns were addressed. Fluconazole Pregnancy And Lactation Text: This medication is Pregnancy Category C and it isn't know if it is safe during pregnancy. It is also excreted in breast milk. Xolair Pregnancy And Lactation Text: This medication is Pregnancy Category B and is considered safe during pregnancy. This medication is excreted in breast milk. Tazorac Counseling:  Patient advised that medication is irritating and drying.  Patient may need to apply sparingly and wash off after an hour before eventually leaving it on overnight.  The patient verbalized understanding of the proper use and possible adverse effects of tazorac.  All of the patient's questions and concerns were addressed. Use Enhanced Medication Counseling?: No Tazorac Pregnancy And Lactation Text: This medication is not safe during pregnancy. It is unknown if this medication is excreted in breast milk. Prednisone Counseling:  I discussed with the patient the risks of prolonged use of prednisone including but not limited to weight gain, insomnia, osteoporosis, mood changes, diabetes, susceptibility to infection, glaucoma and high blood pressure.  In cases where prednisone use is prolonged, patients should be monitored with blood pressure checks, serum glucose levels and an eye exam.  Additionally, the patient may need to be placed on GI prophylaxis, PCP prophylaxis, and calcium and vitamin D supplementation and/or a bisphosphonate.  The patient verbalized understanding of the proper use and the possible adverse effects of prednisone.  All of the patient's questions and concerns were addressed. Glycopyrrolate Counseling:  I discussed with the patient the risks of glycopyrrolate including but not limited to skin rash, drowsiness, dry mouth, difficulty urinating, and blurred vision. Simponi Counseling:  I discussed with the patient the risks of golimumab including but not limited to myelosuppression, immunosuppression, autoimmune hepatitis, demyelinating diseases, lymphoma, and serious infections.  The patient understands that monitoring is required including a PPD at baseline and must alert us or the primary physician if symptoms of infection or other concerning signs are noted. Ivermectin Pregnancy And Lactation Text: This medication is Pregnancy Category C and it isn't known if it is safe during pregnancy. It is also excreted in breast milk. Clindamycin Pregnancy And Lactation Text: This medication can be used in pregnancy if certain situations. Clindamycin is also present in breast milk. Benzoyl Peroxide Counseling: Patient counseled that medicine may cause skin irritation and bleach clothing.  In the event of skin irritation, the patient was advised to reduce the amount of the drug applied or use it less frequently.   The patient verbalized understanding of the proper use and possible adverse effects of benzoyl peroxide.  All of the patient's questions and concerns were addressed. Carac Counseling:  I discussed with the patient the risks of Carac including but not limited to erythema, scaling, itching, weeping, crusting, and pain. Ilumya Pregnancy And Lactation Text: The risk during pregnancy and breastfeeding is uncertain with this medication. Gabapentin Pregnancy And Lactation Text: This medication is Pregnancy Category C and isn't considered safe during pregnancy. It is excreted in breast milk. Erythromycin Counseling:  I discussed with the patient the risks of erythromycin including but not limited to GI upset, allergic reaction, drug rash, diarrhea, increase in liver enzymes, and yeast infections. Doxepin Counseling:  Patient advised that the medication is sedating and not to drive a car after taking this medication. Patient informed of potential adverse effects including but not limited to dry mouth, urinary retention, and blurry vision.  The patient verbalized understanding of the proper use and possible adverse effects of doxepin.  All of the patient's questions and concerns were addressed. Erivedge Counseling- I discussed with the patient the risks of Erivedge including but not limited to nausea, vomiting, diarrhea, constipation, weight loss, changes in the sense of taste, decreased appetite, muscle spasms, and hair loss.  The patient verbalized understanding of the proper use and possible adverse effects of Erivedge.  All of the patient's questions and concerns were addressed. Detail Level: Zone Zyclara Counseling:  I discussed with the patient the risks of imiquimod including but not limited to erythema, scaling, itching, weeping, crusting, and pain.  Patient understands that the inflammatory response to imiquimod is variable from person to person and was educated regarded proper titration schedule.  If flu-like symptoms develop, patient knows to discontinue the medication and contact us. Terbinafine Pregnancy And Lactation Text: This medication is Pregnancy Category B and is considered safe during pregnancy. It is also excreted in breast milk and breast feeding isn't recommended. Humira Pregnancy And Lactation Text: This medication is Pregnancy Category B and is considered safe during pregnancy. It is unknown if this medication is excreted in breast milk. Hydroxyzine Counseling: Patient advised that the medication is sedating and not to drive a car after taking this medication.  Patient informed of potential adverse effects including but not limited to dry mouth, urinary retention, and blurry vision.  The patient verbalized understanding of the proper use and possible adverse effects of hydroxyzine.  All of the patient's questions and concerns were addressed. Cimzia Counseling:  I discussed with the patient the risks of Cimzia including but not limited to immunosuppression, allergic reactions and infections.  The patient understands that monitoring is required including a PPD at baseline and must alert us or the primary physician if symptoms of infection or other concerning signs are noted. Hydroxychloroquine Pregnancy And Lactation Text: This medication has been shown to cause fetal harm but it isn't assigned a Pregnancy Risk Category. There are small amounts excreted in breast milk. Odomzo Counseling- I discussed with the patient the risks of Odomzo including but not limited to nausea, vomiting, diarrhea, constipation, weight loss, changes in the sense of taste, decreased appetite, muscle spasms, and hair loss.  The patient verbalized understanding of the proper use and possible adverse effects of Odomzo.  All of the patient's questions and concerns were addressed. Azathioprine Counseling:  I discussed with the patient the risks of azathioprine including but not limited to myelosuppression, immunosuppression, hepatotoxicity, lymphoma, and infections.  The patient understands that monitoring is required including baseline LFTs, Creatinine, possible TPMP genotyping and weekly CBCs for the first month and then every 2 weeks thereafter.  The patient verbalized understanding of the proper use and possible adverse effects of azathioprine.  All of the patient's questions and concerns were addressed. Gabapentin Counseling: I discussed with the patient the risks of gabapentin including but not limited to dizziness, somnolence, fatigue and ataxia. Topical Sulfur Applications Counseling: Topical Sulfur Counseling: Patient counseled that this medication may cause skin irritation or allergic reactions.  In the event of skin irritation, the patient was advised to reduce the amount of the drug applied or use it less frequently.   The patient verbalized understanding of the proper use and possible adverse effects of topical sulfur application.  All of the patient's questions and concerns were addressed. Cephalexin Counseling: I counseled the patient regarding use of cephalexin as an antibiotic for prophylactic and/or therapeutic purposes. Cephalexin (commonly prescribed under brand name Keflex) is a cephalosporin antibiotic which is active against numerous classes of bacteria, including most skin bacteria. Side effects may include nausea, diarrhea, gastrointestinal upset, rash, hives, yeast infections, and in rare cases, hepatitis, kidney disease, seizures, fever, confusion, neurologic symptoms, and others. Patients with severe allergies to penicillin medications are cautioned that there is about a 10% incidence of cross-reactivity with cephalosporins. When possible, patients with penicillin allergies should use alternatives to cephalosporins for antibiotic therapy. Xelestefaniz Pregnancy And Lactation Text: This medication is Pregnancy Category D and is not considered safe during pregnancy.  The risk during breast feeding is also uncertain. Dupixent Pregnancy And Lactation Text: This medication likely crosses the placenta but the risk for the fetus is uncertain. This medication is excreted in breast milk. Siliq Counseling:  I discussed with the patient the risks of Siliq including but not limited to new or worsening depression, suicidal thoughts and behavior, immunosuppression, malignancy, posterior leukoencephalopathy syndrome, and serious infections.  The patient understands that monitoring is required including a PPD at baseline and must alert us or the primary physician if symptoms of infection or other concerning signs are noted. There is also a special program designed to monitor depression which is required with Siliq. Tetracycline Counseling: Patient counseled regarding possible photosensitivity and increased risk for sunburn.  Patient instructed to avoid sunlight, if possible.  When exposed to sunlight, patients should wear protective clothing, sunglasses, and sunscreen.  The patient was instructed to call the office immediately if the following severe adverse effects occur:  hearing changes, easy bruising/bleeding, severe headache, or vision changes.  The patient verbalized understanding of the proper use and possible adverse effects of tetracycline.  All of the patient's questions and concerns were addressed. Patient understands to avoid pregnancy while on therapy due to potential birth defects. Spironolactone Pregnancy And Lactation Text: This medication can cause feminization of the male fetus and should be avoided during pregnancy. The active metabolite is also found in breast milk. Otezla Pregnancy And Lactation Text: This medication is Pregnancy Category C and it isn't known if it is safe during pregnancy. It is unknown if it is excreted in breast milk. Minoxidil Counseling: Minoxidil is a topical medication which can increase blood flow where it is applied. It is uncertain how this medication increases hair growth. Side effects are uncommon and include stinging and allergic reactions. Otezla Counseling: The side effects of Otezla were discussed with the patient, including but not limited to worsening or new depression, weight loss, diarrhea, nausea, upper respiratory tract infection, and headache. Patient instructed to call the office should any adverse effect occur.  The patient verbalized understanding of the proper use and possible adverse effects of Otezla.  All the patient's questions and concerns were addressed. Terbinafine Counseling: Patient counseling regarding adverse effects of terbinafine including but not limited to headache, diarrhea, rash, upset stomach, liver function test abnormalities, itching, taste/smell disturbance, nausea, abdominal pain, and flatulence.  There is a rare possibility of liver failure that can occur when taking terbinafine.  The patient understands that a baseline LFT and kidney function test may be required. The patient verbalized understanding of the proper use and possible adverse effects of terbinafine.  All of the patient's questions and concerns were addressed. Hydroxyzine Pregnancy And Lactation Text: This medication is not safe during pregnancy and should not be taken. It is also excreted in breast milk and breast feeding isn't recommended. Glycopyrrolate Pregnancy And Lactation Text: This medication is Pregnancy Category B and is considered safe during pregnancy. It is unknown if it is excreted breast milk. Azathioprine Pregnancy And Lactation Text: This medication is Pregnancy Category D and isn't considered safe during pregnancy. It is unknown if this medication is excreted in breast milk. Azithromycin Pregnancy And Lactation Text: This medication is considered safe during pregnancy and is also secreted in breast milk. Doxepin Pregnancy And Lactation Text: This medication is Pregnancy Category C and it isn't known if it is safe during pregnancy. It is also excreted in breast milk and breast feeding isn't recommended. Cosentyx Counseling:  I discussed with the patient the risks of Cosentyx including but not limited to worsening of Crohn's disease, immunosuppression, allergic reactions and infections.  The patient understands that monitoring is required including a PPD at baseline and must alert us or the primary physician if symptoms of infection or other concerning signs are noted. Quinolones Counseling:  I discussed with the patient the risks of fluoroquinolones including but not limited to GI upset, allergic reaction, drug rash, diarrhea, dizziness, photosensitivity, yeast infections, liver function test abnormalities, tendonitis/tendon rupture. Arava Pregnancy And Lactation Text: This medication is Pregnancy Category X and is absolutely contraindicated during pregnancy. It is unknown if it is excreted in breast milk. Protopic Counseling: Patient may experience a mild burning sensation during topical application. Protopic is not approved in children less than 2 years of age. There have been case reports of hematologic and skin malignancies in patients using topical calcineurin inhibitors although causality is questionable. Oxybutynin Pregnancy And Lactation Text: This medication is Pregnancy Category B and is considered safe during pregnancy. It is unknown if it is excreted in breast milk. Xolair Counseling:  Patient informed of potential adverse effects including but not limited to fever, muscle aches, rash and allergic reactions.  The patient verbalized understanding of the proper use and possible adverse effects of Xolair.  All of the patient's questions and concerns were addressed. Methotrexate Counseling:  Patient counseled regarding adverse effects of methotrexate including but not limited to nausea, vomiting, abnormalities in liver function tests. Patients may develop mouth sores, rash, diarrhea, and abnormalities in blood counts. The patient understands that monitoring is required including LFT's and blood counts.  There is a rare possibility of scarring of the liver and lung problems that can occur when taking methotrexate. Persistent nausea, loss of appetite, pale stools, dark urine, cough, and shortness of breath should be reported immediately. Patient advised to discontinue methotrexate treatment at least three months before attempting to become pregnant.  I discussed the need for folate supplements while taking methotrexate.  These supplements can decrease side effects during methotrexate treatment. The patient verbalized understanding of the proper use and possible adverse effects of methotrexate.  All of the patient's questions and concerns were addressed. Ketoconazole Counseling:   Patient counseled regarding improving absorption with orange juice.  Adverse effects include but are not limited to breast enlargement, headache, diarrhea, nausea, upset stomach, liver function test abnormalities, taste disturbance, and stomach pain.  There is a rare possibility of liver failure that can occur when taking ketoconazole. The patient understands that monitoring of LFTs may be required, especially at baseline. The patient verbalized understanding of the proper use and possible adverse effects of ketoconazole.  All of the patient's questions and concerns were addressed. Thalidomide Counseling: I discussed with the patient the risks of thalidomide including but not limited to birth defects, anxiety, weakness, chest pain, dizziness, cough and severe allergy. Erythromycin Pregnancy And Lactation Text: This medication is Pregnancy Category B and is considered safe during pregnancy. It is also excreted in breast milk. High Dose Vitamin A Pregnancy And Lactation Text: High dose vitamin A therapy is contraindicated during pregnancy and breast feeding. Prednisone Pregnancy And Lactation Text: This medication is Pregnancy Category C and it isn't know if it is safe during pregnancy. This medication is excreted in breast milk. Hydroquinone Counseling:  Patient advised that medication may result in skin irritation, lightening (hypopigmentation), dryness, and burning.  In the event of skin irritation, the patient was advised to reduce the amount of the drug applied or use it less frequently.  Rarely, spots that are treated with hydroquinone can become darker (pseudoochronosis).  Should this occur, patient instructed to stop medication and call the office. The patient verbalized understanding of the proper use and possible adverse effects of hydroquinone.  All of the patient's questions and concerns were addressed. Metronidazole Counseling:  I discussed with the patient the risks of metronidazole including but not limited to seizures, nausea/vomiting, a metallic taste in the mouth, nausea/vomiting and severe allergy. Albendazole Counseling:  I discussed with the patient the risks of albendazole including but not limited to cytopenia, kidney damage, nausea/vomiting and severe allergy.  The patient understands that this medication is being used in an off-label manner. Griseofulvin Pregnancy And Lactation Text: This medication is Pregnancy Category X and is known to cause serious birth defects. It is unknown if this medication is excreted in breast milk but breast feeding should be avoided. Isotretinoin Pregnancy And Lactation Text: This medication is Pregnancy Category X and is considered extremely dangerous during pregnancy. It is unknown if it is excreted in breast milk. Drysol Counseling:  I discussed with the patient the risks of drysol/aluminum chloride including but not limited to skin rash, itching, irritation, burning. Valtrex Pregnancy And Lactation Text: this medication is Pregnancy Category B and is considered safe during pregnancy. This medication is not directly found in breast milk but it's metabolite acyclovir is present. Sski Pregnancy And Lactation Text: This medication is Pregnancy Category D and isn't considered safe during pregnancy. It is excreted in breast milk. Acitretin Pregnancy And Lactation Text: This medication is Pregnancy Category X and should not be given to women who are pregnant or may become pregnant in the future. This medication is excreted in breast milk. Clofazimine Counseling:  I discussed with the patient the risks of clofazimine including but not limited to skin and eye pigmentation, liver damage, nausea/vomiting, gastrointestinal bleeding and allergy. Topical Sulfur Applications Pregnancy And Lactation Text: This medication is Pregnancy Category C and has an unknown safety profile during pregnancy. It is unknown if this topical medication is excreted in breast milk. Topical Retinoid counseling:  Patient advised to apply a pea-sized amount only at bedtime and wait 30 minutes after washing their face before applying.  If too drying, patient may add a non-comedogenic moisturizer. The patient verbalized understanding of the proper use and possible adverse effects of retinoids.  All of the patient's questions and concerns were addressed. 5-Fu Counseling: 5-Fluorouracil Counseling:  I discussed with the patient the risks of 5-fluorouracil including but not limited to erythema, scaling, itching, weeping, crusting, and pain. Rituxan Pregnancy And Lactation Text: This medication is Pregnancy Category C and it isn't know if it is safe during pregnancy. It is unknown if this medication is excreted in breast milk but similar antibodies are known to be excreted. Enbrel Counseling:  I discussed with the patient the risks of etanercept including but not limited to myelosuppression, immunosuppression, autoimmune hepatitis, demyelinating diseases, lymphoma, and infections.  The patient understands that monitoring is required including a PPD at baseline and must alert us or the primary physician if symptoms of infection or other concerning signs are noted. Bexarotene Counseling:  I discussed with the patient the risks of bexarotene including but not limited to hair loss, dry lips/skin/eyes, liver abnormalities, hyperlipidemia, pancreatitis, depression/suicidal ideation, photosensitivity, drug rash/allergic reactions, hypothyroidism, anemia, leukopenia, infection, cataracts, and teratogenicity.  Patient understands that they will need regular blood tests to check lipid profile, liver function tests, white blood cell count, thyroid function tests and pregnancy test if applicable. Rifampin Counseling: I discussed with the patient the risks of rifampin including but not limited to liver damage, kidney damage, red-orange body fluids, nausea/vomiting and severe allergy. Dupixent Counseling: I discussed with the patient the risks of dupilumab including but not limited to eye infection and irritation, cold sores, injection site reactions, worsening of asthma, allergic reactions and increased risk of parasitic infection.  Live vaccines should be avoided while taking dupilumab. Dupilumab will also interact with certain medications such as warfarin and cyclosporine. The patient understands that monitoring is required and they must alert us or the primary physician if symptoms of infection or other concerning signs are noted. Itraconazole Counseling:  I discussed with the patient the risks of itraconazole including but not limited to liver damage, nausea/vomiting, neuropathy, and severe allergy.  The patient understands that this medication is best absorbed when taken with acidic beverages such as non-diet cola or ginger ale.  The patient understands that monitoring is required including baseline LFTs and repeat LFTs at intervals.  The patient understands that they are to contact us or the primary physician if concerning signs are noted. Doxycycline Counseling:  Patient counseled regarding possible photosensitivity and increased risk for sunburn.  Patient instructed to avoid sunlight, if possible.  When exposed to sunlight, patients should wear protective clothing, sunglasses, and sunscreen.  The patient was instructed to call the office immediately if the following severe adverse effects occur:  hearing changes, easy bruising/bleeding, severe headache, or vision changes.  The patient verbalized understanding of the proper use and possible adverse effects of doxycycline.  All of the patient's questions and concerns were addressed. Ivermectin Counseling:  Patient instructed to take medication on an empty stomach with a full glass of water.  Patient informed of potential adverse effects including but not limited to nausea, diarrhea, dizziness, itching, and swelling of the extremities or lymph nodes.  The patient verbalized understanding of the proper use and possible adverse effects of ivermectin.  All of the patient's questions and concerns were addressed. Stelara Counseling:  I discussed with the patient the risks of ustekinumab including but not limited to immunosuppression, malignancy, posterior leukoencephalopathy syndrome, and serious infections.  The patient understands that monitoring is required including a PPD at baseline and must alert us or the primary physician if symptoms of infection or other concerning signs are noted. Drysol Pregnancy And Lactation Text: This medication is considered safe during pregnancy and breast feeding. Eucrisa Counseling: Patient may experience a mild burning sensation during topical application. Eucrisa is not approved in children less than 2 years of age. Solaraze Pregnancy And Lactation Text: This medication is Pregnancy Category B and is considered safe. There is some data to suggest avoiding during the third trimester. It is unknown if this medication is excreted in breast milk. Solaraze Counseling:  I discussed with the patient the risks of Solaraze including but not limited to erythema, scaling, itching, weeping, crusting, and pain. Imiquimod Counseling:  I discussed with the patient the risks of imiquimod including but not limited to erythema, scaling, itching, weeping, crusting, and pain.  Patient understands that the inflammatory response to imiquimod is variable from person to person and was educated regarded proper titration schedule.  If flu-like symptoms develop, patient knows to discontinue the medication and contact us. Ketoconazole Pregnancy And Lactation Text: This medication is Pregnancy Category C and it isn't know if it is safe during pregnancy. It is also excreted in breast milk and breast feeding isn't recommended. Cimetidine Counseling:  I discussed with the patient the risks of Cimetidine including but not limited to gynecomastia, headache, diarrhea, nausea, drowsiness, arrhythmias, pancreatitis, skin rashes, psychosis, bone marrow suppression and kidney toxicity. Griseofulvin Counseling:  I discussed with the patient the risks of griseofulvin including but not limited to photosensitivity, cytopenia, liver damage, nausea/vomiting and severe allergy.  The patient understands that this medication is best absorbed when taken with a fatty meal (e.g., ice cream or french fries). Dapsone Pregnancy And Lactation Text: This medication is Pregnancy Category C and is not considered safe during pregnancy or breast feeding. Valtrex Counseling: I discussed with the patient the risks of valacyclovir including but not limited to kidney damage, nausea, vomiting and severe allergy.  The patient understands that if the infection seems to be worsening or is not improving, they are to call. Bactrim Pregnancy And Lactation Text: This medication is Pregnancy Category D and is known to cause fetal risk.  It is also excreted in breast milk. Hydroquinone Pregnancy And Lactation Text: This medication has not been assigned a Pregnancy Risk Category but animal studies failed to show danger with the topical medication. It is unknown if the medication is excreted in breast milk. SSKI Counseling:  I discussed with the patient the risks of SSKI including but not limited to thyroid abnormalities, metallic taste, GI upset, fever, headache, acne, arthralgias, paraesthesias, lymphadenopathy, easy bleeding, arrhythmias, and allergic reaction. Arava Counseling:  Patient counseled regarding adverse effects of Arava including but not limited to nausea, vomiting, abnormalities in liver function tests. Patients may develop mouth sores, rash, diarrhea, and abnormalities in blood counts. The patient understands that monitoring is required including LFTs and blood counts.  There is a rare possibility of scarring of the liver and lung problems that can occur when taking methotrexate. Persistent nausea, loss of appetite, pale stools, dark urine, cough, and shortness of breath should be reported immediately. Patient advised to discontinue Arava treatment and consult with a physician prior to attempting conception. The patient will have to undergo a treatment to eliminate Arava from the body prior to conception. Acitretin Counseling:  I discussed with the patient the risks of acitretin including but not limited to hair loss, dry lips/skin/eyes, liver damage, hyperlipidemia, depression/suicidal ideation, photosensitivity.  Serious rare side effects can include but are not limited to pancreatitis, pseudotumor cerebri, bony changes, clot formation/stroke/heart attack.  Patient understands that alcohol is contraindicated since it can result in liver toxicity and significantly prolong the elimination of the drug by many years. Clindamycin Counseling: I counseled the patient regarding use of clindamycin as an antibiotic for prophylactic and/or therapeutic purposes. Clindamycin is active against numerous classes of bacteria, including skin bacteria. Side effects may include nausea, diarrhea, gastrointestinal upset, rash, hives, yeast infections, and in rare cases, colitis. Isotretinoin Counseling: Patient should get monthly blood tests, not donate blood, not drive at night if vision affected, not share medication, and not undergo elective surgery for 6 months after tx completed. Side effects reviewed, pt to contact office should one occur. Humira Counseling:  I discussed with the patient the risks of adalimumab including but not limited to myelosuppression, immunosuppression, autoimmune hepatitis, demyelinating diseases, lymphoma, and serious infections.  The patient understands that monitoring is required including a PPD at baseline and must alert us or the primary physician if symptoms of infection or other concerning signs are noted. Oxybutynin Counseling:  I discussed with the patient the risks of oxybutynin including but not limited to skin rash, drowsiness, dry mouth, difficulty urinating, and blurred vision. Methotrexate Pregnancy And Lactation Text: This medication is Pregnancy Category X and is known to cause fetal harm. This medication is excreted in breast milk. Colchicine Counseling:  Patient counseled regarding adverse effects including but not limited to stomach upset (nausea, vomiting, stomach pain, or diarrhea).  Patient instructed to limit alcohol consumption while taking this medication.  Colchicine may reduce blood counts especially with prolonged use.  The patient understands that monitoring of kidney function and blood counts may be required, especially at baseline. The patient verbalized understanding of the proper use and possible adverse effects of colchicine.  All of the patient's questions and concerns were addressed. Minocycline Counseling: Patient advised regarding possible photosensitivity and discoloration of the teeth, skin, lips, tongue and gums.  Patient instructed to avoid sunlight, if possible.  When exposed to sunlight, patients should wear protective clothing, sunglasses, and sunscreen.  The patient was instructed to call the office immediately if the following severe adverse effects occur:  hearing changes, easy bruising/bleeding, severe headache, or vision changes.  The patient verbalized understanding of the proper use and possible adverse effects of minocycline.  All of the patient's questions and concerns were addressed. Rifampin Pregnancy And Lactation Text: This medication is Pregnancy Category C and it isn't know if it is safe during pregnancy. It is also excreted in breast milk and should not be used if you are breast feeding. Taltz Counseling: I discussed with the patient the risks of ixekizumab including but not limited to immunosuppression, serious infections, worsening of inflammatory bowel disease and drug reactions.  The patient understands that monitoring is required including a PPD at baseline and must alert us or the primary physician if symptoms of infection or other concerning signs are noted. Infliximab Counseling:  I discussed with the patient the risks of infliximab including but not limited to myelosuppression, immunosuppression, autoimmune hepatitis, demyelinating diseases, lymphoma, and serious infections.  The patient understands that monitoring is required including a PPD at baseline and must alert us or the primary physician if symptoms of infection or other concerning signs are noted. Tremfya Counseling: I discussed with the patient the risks of guselkumab including but not limited to immunosuppression, serious infections, worsening of inflammatory bowel disease and drug reactions.  The patient understands that monitoring is required including a PPD at baseline and must alert us or the primary physician if symptoms of infection or other concerning signs are noted. Cyclosporine Counseling:  I discussed with the patient the risks of cyclosporine including but not limited to hypertension, gingival hyperplasia,myelosuppression, immunosuppression, liver damage, kidney damage, neurotoxicity, lymphoma, and serious infections. The patient understands that monitoring is required including baseline blood pressure, CBC, CMP, lipid panel and uric acid, and then 1-2 times monthly CMP and blood pressure. Protopic Pregnancy And Lactation Text: This medication is Pregnancy Category C. It is unknown if this medication is excreted in breast milk when applied topically. Cyclophosphamide Pregnancy And Lactation Text: This medication is Pregnancy Category D and it isn't considered safe during pregnancy. This medication is excreted in breast milk. Benzoyl Peroxide Pregnancy And Lactation Text: This medication is Pregnancy Category C. It is unknown if benzoyl peroxide is excreted in breast milk. Topical Clindamycin Counseling: Patient counseled that this medication may cause skin irritation or allergic reactions.  In the event of skin irritation, the patient was advised to reduce the amount of the drug applied or use it less frequently.   The patient verbalized understanding of the proper use and possible adverse effects of clindamycin.  All of the patient's questions and concerns were addressed. Doxycycline Pregnancy And Lactation Text: This medication is Pregnancy Category D and not consider safe during pregnancy. It is also excreted in breast milk but is considered safe for shorter treatment courses. Nsaids Counseling: NSAID Counseling: I discussed with the patient that NSAIDs should be taken with food. Prolonged use of NSAIDs can result in the development of stomach ulcers.  Patient advised to stop taking NSAIDs if abdominal pain occurs.  The patient verbalized understanding of the proper use and possible adverse effects of NSAIDs.  All of the patient's questions and concerns were addressed. Bexarotene Pregnancy And Lactation Text: This medication is Pregnancy Category X and should not be given to women who are pregnant or may become pregnant. This medication should not be used if you are breast feeding. Carac Pregnancy And Lactation Text: This medication is Pregnancy Category X and contraindicated in pregnancy and in women who may become pregnant. It is unknown if this medication is excreted in breast milk. Tetracycline Pregnancy And Lactation Text: This medication is Pregnancy Category D and not consider safe during pregnancy. It is also excreted in breast milk. Cellcept Counseling:  I discussed with the patient the risks of mycophenolate mofetil including but not limited to infection/immunosuppression, GI upset, hypokalemia, hypercholesterolemia, bone marrow suppression, lymphoproliferative disorders, malignancy, GI ulceration/bleed/perforation, colitis, interstitial lung disease, kidney failure, progressive multifocal leukoencephalopathy, and birth defects.  The patient understands that monitoring is required including a baseline creatinine and regular CBC testing. In addition, patient must alert us immediately if symptoms of infection or other concerning signs are noted. Birth Control Pills Counseling: Birth Control Pill Counseling: I discussed with the patient the potential side effects of OCPs including but not limited to increased risk of stroke, heart attack, thrombophlebitis, deep venous thrombosis, hepatic adenomas, breast changes, GI upset, headaches, and depression.  The patient verbalized understanding of the proper use and possible adverse effects of OCPs. All of the patient's questions and concerns were addressed. Ilumya Counseling: I discussed with the patient the risks of tildrakizumab including but not limited to immunosuppression, malignancy, posterior leukoencephalopathy syndrome, and serious infections.  The patient understands that monitoring is required including a PPD at baseline and must alert us or the primary physician if symptoms of infection or other concerning signs are noted. Xeljanz Counseling: I discussed with the patient the risks of Xeljanz therapy including increased risk of infection, liver issues, headache, diarrhea, or cold symptoms. Live vaccines should be avoided. They were instructed to call if they have any problems. Cyclophosphamide Counseling:  I discussed with the patient the risks of cyclophosphamide including but not limited to hair loss, hormonal abnormalities, decreased fertility, abdominal pain, diarrhea, nausea and vomiting, bone marrow suppression and infection. The patient understands that monitoring is required while taking this medication. Nsaids Pregnancy And Lactation Text: These medications are considered safe up to 30 weeks gestation. It is excreted in breast milk. Bactrim Counseling:  I discussed with the patient the risks of sulfa antibiotics including but not limited to GI upset, allergic reaction, drug rash, diarrhea, dizziness, photosensitivity, and yeast infections.  Rarely, more serious reactions can occur including but not limited to aplastic anemia, agranulocytosis, methemoglobinemia, blood dyscrasias, liver or kidney failure, lung infiltrates or desquamative/blistering drug rashes. Dapsone Counseling: I discussed with the patient the risks of dapsone including but not limited to hemolytic anemia, agranulocytosis, rashes, methemoglobinemia, kidney failure, peripheral neuropathy, headaches, GI upset, and liver toxicity.  Patients who start dapsone require monitoring including baseline LFTs and weekly CBCs for the first month, then every month thereafter.  The patient verbalized understanding of the proper use and possible adverse effects of dapsone.  All of the patient's questions and concerns were addressed. Spironolactone Counseling: Patient advised regarding risks of diarrhea, abdominal pain, hyperkalemia, birth defects (for female patients), liver toxicity and renal toxicity. The patient may need blood work to monitor liver and kidney function and potassium levels while on therapy. The patient verbalized understanding of the proper use and possible adverse effects of spironolactone.  All of the patient's questions and concerns were addressed. Cephalexin Pregnancy And Lactation Text: This medication is Pregnancy Category B and considered safe during pregnancy.  It is also excreted in breast milk but can be used safely for shorter doses. Picato Counseling:  I discussed with the patient the risks of Picato including but not limited to erythema, scaling, itching, weeping, crusting, and pain. Hydroxychloroquine Counseling:  I discussed with the patient that a baseline ophthalmologic exam is needed at the start of therapy and every year thereafter while on therapy. A CBC may also be warranted for monitoring.  The side effects of this medication were discussed with the patient, including but not limited to agranulocytosis, aplastic anemia, seizures, rashes, retinopathy, and liver toxicity. Patient instructed to call the office should any adverse effect occur.  The patient verbalized understanding of the proper use and possible adverse effects of Plaquenil.  All the patient's questions and concerns were addressed. High Dose Vitamin A Counseling: Side effects reviewed, pt to contact office should one occur. Rituxan Counseling:  I discussed with the patient the risks of Rituxan infusions. Side effects can include infusion reactions, severe drug rashes including mucocutaneous reactions, reactivation of latent hepatitis and other infections and rarely progressive multifocal leukoencephalopathy.  All of the patient's questions and concerns were addressed. Cimzia Pregnancy And Lactation Text: This medication crosses the placenta but can be considered safe in certain situations. Cimzia may be excreted in breast milk. Metronidazole Pregnancy And Lactation Text: This medication is Pregnancy Category B and considered safe during pregnancy.  It is also excreted in breast milk. Birth Control Pills Pregnancy And Lactation Text: This medication should be avoided if pregnant and for the first 30 days post-partum.

## 2019-10-28 DIAGNOSIS — M47.816 LUMBAR SPONDYLOSIS: ICD-10-CM

## 2019-10-28 RX ORDER — GABAPENTIN 300 MG/1
CAPSULE ORAL
Qty: 90 CAPSULE | Refills: 0 | OUTPATIENT
Start: 2019-10-28

## 2019-11-04 ENCOUNTER — TELEPHONE (OUTPATIENT)
Dept: FAMILY MEDICINE CLINIC | Facility: CLINIC | Age: 74
End: 2019-11-04

## 2019-11-04 NOTE — TELEPHONE ENCOUNTER
Pt  Daughter call and stated that Mom has pink eye pt asking to call in a Rx for it and want to be send at the Pharmacy on file [facility]

## 2019-11-06 DIAGNOSIS — M47.816 LUMBAR SPONDYLOSIS: ICD-10-CM

## 2019-11-06 RX ORDER — GABAPENTIN 300 MG/1
300 CAPSULE ORAL 3 TIMES DAILY
Qty: 90 CAPSULE | Refills: 0 | Status: CANCELLED | OUTPATIENT
Start: 2019-11-06

## 2019-11-06 NOTE — TELEPHONE ENCOUNTER
Pharmacy requesting gabapentin 300 mg [facility] previous prescribed by a different provider when she was in the home   Pharmacy will be faxing the request

## 2019-11-07 RX ORDER — GABAPENTIN 300 MG/1
300 CAPSULE ORAL 3 TIMES DAILY
Qty: 90 CAPSULE | Refills: 3 | Status: SHIPPED | OUTPATIENT
Start: 2019-11-07 | End: 2020-02-19 | Stop reason: ALTCHOICE

## 2019-11-19 DIAGNOSIS — M25.473 ANKLE SWELLING, UNSPECIFIED LATERALITY: ICD-10-CM

## 2019-11-19 RX ORDER — FUROSEMIDE 20 MG/1
TABLET ORAL
Qty: 60 TABLET | Refills: 0 | Status: SHIPPED | OUTPATIENT
Start: 2019-11-19 | End: 2019-12-26 | Stop reason: SDUPTHER

## 2019-12-26 DIAGNOSIS — M25.473 ANKLE SWELLING, UNSPECIFIED LATERALITY: ICD-10-CM

## 2019-12-26 DIAGNOSIS — Z79.899 ENCOUNTER FOR LONG-TERM CURRENT USE OF MEDICATION: ICD-10-CM

## 2019-12-26 DIAGNOSIS — F32.A DEPRESSION, UNSPECIFIED DEPRESSION TYPE: ICD-10-CM

## 2019-12-26 RX ORDER — PANTOPRAZOLE SODIUM 40 MG/1
40 TABLET, DELAYED RELEASE ORAL DAILY
Qty: 90 TABLET | Refills: 0 | Status: SHIPPED | OUTPATIENT
Start: 2019-12-26 | End: 2020-03-23

## 2019-12-26 RX ORDER — CITALOPRAM 10 MG/1
20 TABLET ORAL DAILY
Qty: 180 TABLET | Refills: 0 | Status: SHIPPED | OUTPATIENT
Start: 2019-12-26 | End: 2020-03-23

## 2019-12-26 RX ORDER — FUROSEMIDE 20 MG/1
20 TABLET ORAL 2 TIMES DAILY
Qty: 180 TABLET | Refills: 0 | Status: SHIPPED | OUTPATIENT
Start: 2019-12-26 | End: 2020-02-12

## 2020-02-12 DIAGNOSIS — M25.473 ANKLE SWELLING, UNSPECIFIED LATERALITY: ICD-10-CM

## 2020-02-12 RX ORDER — FUROSEMIDE 20 MG/1
TABLET ORAL
Qty: 180 TABLET | Refills: 0 | Status: SHIPPED | OUTPATIENT
Start: 2020-02-12 | End: 2020-04-10

## 2020-02-19 ENCOUNTER — OFFICE VISIT (OUTPATIENT)
Dept: FAMILY MEDICINE CLINIC | Facility: CLINIC | Age: 75
End: 2020-02-19
Payer: MEDICARE

## 2020-02-19 VITALS
SYSTOLIC BLOOD PRESSURE: 122 MMHG | RESPIRATION RATE: 20 BRPM | HEART RATE: 62 BPM | WEIGHT: 208.8 LBS | TEMPERATURE: 98.7 F | OXYGEN SATURATION: 98 % | HEIGHT: 64 IN | BODY MASS INDEX: 35.65 KG/M2 | DIASTOLIC BLOOD PRESSURE: 74 MMHG

## 2020-02-19 DIAGNOSIS — I10 ESSENTIAL HYPERTENSION: ICD-10-CM

## 2020-02-19 DIAGNOSIS — Z00.00 MEDICARE ANNUAL WELLNESS VISIT, SUBSEQUENT: Primary | ICD-10-CM

## 2020-02-19 DIAGNOSIS — Z01.818 PRE-OP EXAMINATION: ICD-10-CM

## 2020-02-19 DIAGNOSIS — R79.1 PROLONGED PTT (PARTIAL THROMBOPLASTIN TIME): ICD-10-CM

## 2020-02-19 DIAGNOSIS — D50.8 ANEMIA, IRON DEFICIENCY, INADEQUATE DIETARY INTAKE: ICD-10-CM

## 2020-02-19 DIAGNOSIS — H25.9 SENILE CATARACT OF LEFT EYE, UNSPECIFIED AGE-RELATED CATARACT TYPE: ICD-10-CM

## 2020-02-19 PROCEDURE — 1125F AMNT PAIN NOTED PAIN PRSNT: CPT | Performed by: FAMILY MEDICINE

## 2020-02-19 PROCEDURE — 1170F FXNL STATUS ASSESSED: CPT | Performed by: FAMILY MEDICINE

## 2020-02-19 PROCEDURE — 1036F TOBACCO NON-USER: CPT | Performed by: FAMILY MEDICINE

## 2020-02-19 PROCEDURE — G0439 PPPS, SUBSEQ VISIT: HCPCS | Performed by: FAMILY MEDICINE

## 2020-02-19 PROCEDURE — 99214 OFFICE O/P EST MOD 30 MIN: CPT | Performed by: FAMILY MEDICINE

## 2020-02-19 PROCEDURE — 3008F BODY MASS INDEX DOCD: CPT | Performed by: FAMILY MEDICINE

## 2020-02-19 PROCEDURE — 4040F PNEUMOC VAC/ADMIN/RCVD: CPT | Performed by: FAMILY MEDICINE

## 2020-02-19 PROCEDURE — 3074F SYST BP LT 130 MM HG: CPT | Performed by: FAMILY MEDICINE

## 2020-02-19 PROCEDURE — 1160F RVW MEDS BY RX/DR IN RCRD: CPT | Performed by: FAMILY MEDICINE

## 2020-02-19 PROCEDURE — 3078F DIAST BP <80 MM HG: CPT | Performed by: FAMILY MEDICINE

## 2020-02-19 NOTE — PROGRESS NOTES
Assessment and Plan:     Problem List Items Addressed This Visit     None        BMI Counseling: Body mass index is 35 84 kg/m²  The BMI is above normal  Nutrition recommendations include decreasing portion sizes, encouraging healthy choices of fruits and vegetables and moderation in carbohydrate intake  Exercise recommendations include moderate physical activity 150 minutes/week  No pharmacotherapy was ordered  Patient referred to PCP due to patient being overweight  Preventive health issues were discussed with patient, and age appropriate screening tests were ordered as noted in patient's After Visit Summary  Personalized health advice and appropriate referrals for health education or preventive services given if needed, as noted in patient's After Visit Summary       History of Present Illness:     Patient presents for Medicare Annual Wellness visit    Patient Care Team:  Mallory Ramirez MD as PCP - MD Darcy Cisse MD as Endoscopist     Problem List:     Patient Active Problem List   Diagnosis    Primary localized osteoarthritis of right knee    Primary localized osteoarthritis of left knee    Lumbar spondylosis    Osteoarthritis of both knees    Leg pain, bilateral    Rectal prolapse    Essential hypertension    Heart palpitations    Diarrhea    Depression    Anxiety    Anemia, iron deficiency, inadequate dietary intake    Right lower quadrant abdominal pain    Abnormal CT scan    Primary osteoarthritis of right hip    Prolonged PTT (partial thromboplastin time)    Pre-op examination    Anemia    Status post right hip replacement      Past Medical and Surgical History:     Past Medical History:   Diagnosis Date    Anxiety     Asymptomatic gallstones     Chronic pain     Depression 10/22/2015    Hyperlipidemia     Hypertension     Primary localized osteoarthritis of right knee 2/6/2018    Psychiatric disorder     Spinal stenosis     Stage III chronic kidney disease ([facility] [address] ) 4/9/2019     Past Surgical History:   Procedure Laterality Date    APPENDECTOMY      COLONOSCOPY      MS COLONOSCOPY FLX DX W/COLLJ SPEC WHEN PFRMD N/A 3/13/2019    Procedure: COLONOSCOPY with biopsy;  Surgeon: Dae Kovacs MD;  [facility];  Service: Gastroenterology    MS ESOPHAGOGASTRODUODENOSCOPY TRANSORAL DIAGNOSTIC N/A 3/27/2019    Procedure: ESOPHAGOGASTRODUODENOSCOPY (EGD)  with pediatric colonoscope with clotest and jejunum/duodenum bx r/o celiac;  Surgeon: Mary Menard MD;  [facility];  Service: Gastroenterology    MS TOTAL HIP ARTHROPLASTY Right 7/8/2019    Procedure: ARTHROPLASTY HIP TOTAL;  Surgeon: Milly Awad MD;  [facility];  Service: Orthopedics    TOE SURGERY        Family History:     Family History   Problem Relation Age of Onset    Breast cancer additional onset Mother     Diabetes Mother     No Known Problems Father     Alcohol abuse Neg Hx     Substance Abuse Neg Hx     Colon cancer Neg Hx     Colon polyps Neg Hx       Social History:        Social History     Socioeconomic History    Marital status: /Civil Union     Spouse name: None    Number of children: None    Years of education: None    Highest education level: None   Occupational History    None   Social Needs    Financial resource strain: None    Food insecurity:     Worry: None     Inability: None    Transportation needs:     Medical: None     Non-medical: None   Tobacco Use    Smoking status: Never Smoker    Smokeless tobacco: Never Used   Substance and Sexual Activity    Alcohol use: No    Drug use: No    Sexual activity: Not Currently   Lifestyle    Physical activity:     Days per week: None     Minutes per session: None    Stress: None   Relationships    Social connections:     Talks on phone: None     Gets together: None     Attends Jehovah's witness service: None     Active member of club or organization: None     Attends meetings of clubs or organizations: None     Relationship status: None    Intimate partner violence:     Fear of current or ex partner: None     Emotionally abused: None     Physically abused: None     Forced sexual activity: None   Other Topics Concern    None   Social History Narrative    Lives alone   in SNF    Feels safe at home  Medications and Allergies:     Current Outpatient Medications   Medication Sig Dispense Refill    B-D 3CC LUER-MIKA SYR 25GX5/8" 25G X 5/8" 3 ML MISC INJECT 0 5ML OF HEPARIN EVERY 8 HOURS  0    citalopram (CeleXA) 10 mg tablet Take 2 tablets (20 mg total) by mouth daily 180 tablet 0    furosemide (LASIX) 20 mg tablet TAKE ONE TABLET BY MOUTH TWICE A  tablet 0    lisinopril (ZESTRIL) 20 mg tablet Take 1 tablet (20 mg total) by mouth daily 90 tablet 1    pantoprazole (PROTONIX) 40 mg tablet Take 1 tablet (40 mg total) by mouth daily 90 tablet 0     No current facility-administered medications for this visit        Allergies   Allergen Reactions    Copper Rash    Meperidine Nausea Only     Other reaction(s): Nausea    Pt reports she is ok with Demerol now    Nickel Rash      Immunizations:     Immunization History   Administered Date(s) Administered    INFLUENZA 09/27/2016, 10/15/2018    Influenza Split High Dose Preservative Free IM 09/30/2015, 10/19/2016    Influenza TIV (IM) 09/16/2014, 09/14/2017    Influenza, high dose seasonal 0 5 mL 10/15/2018    Pneumococcal Conjugate 13-Valent 09/30/2015    Pneumococcal Polysaccharide PPV23 11/02/2017, 10/18/2018    influenza, trivalent, adjuvanted 09/30/2019      Health Maintenance:         Topic Date Due    MAMMOGRAM  12/19/2019    CRC Screening: Colonoscopy  03/13/2029    Hepatitis C Screening  Completed         Topic Date Due    Influenza Vaccine  07/01/2019      Medicare Health Risk Assessment:     Resp 20   Ht 5' 4" (1 626 m)   Wt 94 7 kg (208 lb 12 8 oz)   LMP  (LMP Unknown)   Breastfeeding No   BMI 35 84 kg/m² Brittani Shepherd is here for her Subsequent Wellness visit  Last Medicare Wellness visit information reviewed, patient interviewed and updates made to the record today  Health Risk Assessment:   Patient rates overall health as very good  Patient feels that their physical health rating is slightly better  Eyesight was rated as same  Hearing was rated as same  Patient feels that their emotional and mental health rating is same  Pain experienced in the last 7 days has been some  Patient's pain rating has been 1/10  Patient states that she has experienced no weight loss or gain in last 6 months  Depression Screening:   PHQ-2 Score: 0  PHQ-9 Score: 0      Fall Risk Screening: In the past year, patient has experienced: no history of falling in past year      Urinary Incontinence Screening:   Patient has not leaked urine accidently in the last six months  Home Safety:  Patient has trouble with stairs inside or outside of their home  Patient has working smoke alarms and has working carbon monoxide detector  Home safety hazards include: none  Nutrition:   Current diet is No Added Salt and Regular  Medications:   Patient is not currently taking any over-the-counter supplements  Patient is able to manage medications  Activities of Daily Living (ADLs)/Instrumental Activities of Daily Living (IADLs):   Walk and transfer into and out of bed and chair?: No  Dress and groom yourself?: Yes    Bathe or shower yourself?: No    Feed yourself? Yes  Do your laundry/housekeeping?: No  Manage your money, pay your bills and track your expenses?: No  Make your own meals?: No    Do your own shopping?: No    Previous Hospitalizations:   Any hospitalizations or ED visits within the last 12 months?: Yes    How many hospitalizations have you had in the last year?: 1-2    Advance Care Planning:   Living will: Yes    Durable POA for healthcare: Yes    Advanced directive: Yes    Provider agrees with end of life decisions:  Yes Cognitive Screening:   Provider or family/friend/caregiver concerned regarding cognition?: No    PREVENTIVE SCREENINGS      Cardiovascular Screening:    General: Screening Current      Diabetes Screening:     General: Screening Current      Colorectal Cancer Screening:     General: Screening Current      Breast Cancer Screening:     General: Screening Current      Cervical Cancer Screening:    General: Screening Not Indicated      Osteoporosis Screening:    General: Risks and Benefits Discussed      Abdominal Aortic Aneurysm (AAA) Screening:        General: Screening Not Indicated      Lung Cancer Screening:     General: Screening Not Indicated      Hepatitis C Screening:    General: Screening Current    Other Counseling Topics:   Car/seat belt/driving safety and regular weightbearing exercise and calcium and vitamin D intake         Alex Appiah MD

## 2020-02-19 NOTE — PATIENT INSTRUCTIONS
Patient medically stable and cleared for proposed left eye cataract surgery  Her clotting disorder should not be an issue during cataract surgery  Do not see need to prescribe any anticoagulant  PTT prolongation has not complicated other surgeries  Medicare Preventive Visit Patient Instructions  Thank you for completing your Welcome to Medicare Visit or Medicare Annual Wellness Visit today  Your next wellness visit will be due in one year (2/19/2021)  The screening/preventive services that you may require over the next 5-10 years are detailed below  Some tests may not apply to you based off risk factors and/or age  Screening tests ordered at today's visit but not completed yet may show as past due  Also, please note that scanned in results may not display below  Preventive Screenings:  Service Recommendations Previous Testing/Comments   Colorectal Cancer Screening  * Colonoscopy    * Fecal Occult Blood Test (FOBT)/Fecal Immunochemical Test (FIT)  * Fecal DNA/Cologuard Test  * Flexible Sigmoidoscopy Age: 54-65 years old   Colonoscopy: every 10 years (may be performed more frequently if at higher risk)  OR  FOBT/FIT: every 1 year  OR  Cologuard: every 3 years  OR  Sigmoidoscopy: every 5 years  Screening may be recommended earlier than age 48 if at higher risk for colorectal cancer  Also, an individualized decision between you and your healthcare provider will decide whether screening between the ages of 74-80 would be appropriate  Colonoscopy: 03/13/2019  FOBT/FIT: Not on file  Cologuard: Not on file  Sigmoidoscopy: Not on file    Screening Current     Breast Cancer Screening Age: 36 years old  Frequency: every 1-2 years  Not required if history of left and right mastectomy Mammogram: 12/19/2018    Screening Current   Cervical Cancer Screening Between the ages of 21-29, pap smear recommended once every 3 years  Between the ages of 33-67, can perform pap smear with HPV co-testing every 5 years  Recommendations may differ for women with a history of total hysterectomy, cervical cancer, or abnormal pap smears in past  Pap Smear: Not on file    Screening Not Indicated   Hepatitis C Screening Once for adults born between 1945 and 1965  More frequently in patients at high risk for Hepatitis C Hep C Antibody: 11/07/2017    Screening Current   Diabetes Screening 1-2 times per year if you're at risk for diabetes or have pre-diabetes Fasting glucose: No results in last 5 years   A1C: 5 2 % of total Hgb    Screening Current   Cholesterol Screening Once every 5 years if you don't have a lipid disorder  May order more often based on risk factors  Lipid panel: 11/08/2018    Screening Current     Other Preventive Screenings Covered by Medicare:  1  Abdominal Aortic Aneurysm (AAA) Screening: covered once if your at risk  You're considered to be at risk if you have a family history of AAA  2  Lung Cancer Screening: covers low dose CT scan once per year if you meet all of the following conditions: (1) Age 50-69; (2) No signs or symptoms of lung cancer; (3) Current smoker or have quit smoking within the last 15 years; (4) You have a tobacco smoking history of at least 30 pack years (packs per day multiplied by number of years you smoked); (5) You get a written order from a healthcare provider  3  Glaucoma Screening: covered annually if you're considered high risk: (1) You have diabetes OR (2) Family history of glaucoma OR (3)  aged 48 and older OR (3)  American aged 72 and older  3  Osteoporosis Screening: covered every 2 years if you meet one of the following conditions: (1) You're estrogen deficient and at risk for osteoporosis based off medical history and other findings; (2) Have a vertebral abnormality; (3) On glucocorticoid therapy for more than 3 months; (4) Have primary hyperparathyroidism; (5) On osteoporosis medications and need to assess response to drug therapy     · Last bone density test (DXA Scan): 11/28/2017  5  HIV Screening: covered annually if you're between the age of 12-76  Also covered annually if you are younger than 13 and older than 72 with risk factors for HIV infection  For pregnant patients, it is covered up to 3 times per pregnancy  Immunizations:  Immunization Recommendations   Influenza Vaccine Annual influenza vaccination during flu season is recommended for all persons aged >= 6 months who do not have contraindications   Pneumococcal Vaccine (Prevnar and Pneumovax)  * Prevnar = PCV13  * Pneumovax = PPSV23   Adults 25-60 years old: 1-3 doses may be recommended based on certain risk factors  Adults 72 years old: Prevnar (PCV13) vaccine recommended followed by Pneumovax (PPSV23) vaccine  If already received PPSV23 since turning 65, then PCV13 recommended at least one year after PPSV23 dose  Hepatitis B Vaccine 3 dose series if at intermediate or high risk (ex: diabetes, end stage renal disease, liver disease)   Tetanus (Td) Vaccine - COST NOT COVERED BY MEDICARE PART B Following completion of primary series, a booster dose should be given every 10 years to maintain immunity against tetanus  Td may also be given as tetanus wound prophylaxis  Tdap Vaccine - COST NOT COVERED BY MEDICARE PART B Recommended at least once for all adults  For pregnant patients, recommended with each pregnancy  Shingles Vaccine (Shingrix) - COST NOT COVERED BY MEDICARE PART B  2 shot series recommended in those aged 48 and above     Health Maintenance Due:      Topic Date Due    MAMMOGRAM  12/19/2019    CRC Screening: Colonoscopy  03/13/2029    Hepatitis C Screening  Completed     Immunizations Due:      Topic Date Due    Influenza Vaccine  07/01/2019     Advance Directives   What are advance directives? Advance directives are legal documents that state your wishes and plans for medical care  These plans are made ahead of time in case you lose your ability to make decisions for yourself  Advance directives can apply to any medical decision, such as the treatments you want, and if you want to donate organs  What are the types of advance directives? There are many types of advance directives, and each state has rules about how to use them  You may choose a combination of any of the following:  · Living will: This is a written record of the treatment you want  You can also choose which treatments you do not want, which to limit, and which to stop at a certain time  This includes surgery, medicine, IV fluid, and tube feedings  · Durable power of  for healthcare [facility]): This is a written record that states who you want to make healthcare choices for you when you are unable to make them for yourself  This person, called a proxy, is usually a family member or a friend  You may choose more than 1 proxy  · Do not resuscitate (DNR) order:  A DNR order is used in case your heart stops beating or you stop breathing  It is a request not to have certain forms of treatment, such as CPR  A DNR order may be included in other types of advance directives  · Medical directive: This covers the care that you want if you are in a coma, near death, or unable to make decisions for yourself  You can list the treatments you want for each condition  Treatment may include pain medicine, surgery, blood transfusions, dialysis, IV or tube feedings, and a ventilator (breathing machine)  · Values history: This document has questions about your views, beliefs, and how you feel and think about life  This information can help others choose the care that you would choose  Why are advance directives important? An advance directive helps you control your care  Although spoken wishes may be used, it is better to have your wishes written down  Spoken wishes can be misunderstood, or not followed  Treatments may be given even if you do not want them   An advance directive may make it easier for your family to make difficult choices about your care  Weight Management   Why it is important to manage your weight:  Being overweight increases your risk of health conditions such as heart disease, high blood pressure, type 2 diabetes, and certain types of cancer  It can also increase your risk for osteoarthritis, sleep apnea, and other respiratory problems  Aim for a slow, steady weight loss  Even a small amount of weight loss can lower your risk of health problems  How to lose weight safely:  A safe and healthy way to lose weight is to eat fewer calories and get regular exercise  You can lose up about 1 pound a week by decreasing the number of calories you eat by 500 calories each day  Healthy meal plan for weight management:  A healthy meal plan includes a variety of foods, contains fewer calories, and helps you stay healthy  A healthy meal plan includes the following:  · Eat whole-grain foods more often  A healthy meal plan should contain fiber  Fiber is the part of grains, fruits, and vegetables that is not broken down by your body  Whole-grain foods are healthy and provide extra fiber in your diet  Some examples of whole-grain foods are whole-wheat breads and pastas, oatmeal, brown rice, and bulgur  · Eat a variety of vegetables every day  Include dark, leafy greens such as spinach, kale, sabine greens, and mustard greens  Eat yellow and orange vegetables such as carrots, sweet potatoes, and winter squash  · Eat a variety of fruits every day  Choose fresh or canned fruit (canned in its own juice or light syrup) instead of juice  Fruit juice has very little or no fiber  · Eat low-fat dairy foods  Drink fat-free (skim) milk or 1% milk  Eat fat-free yogurt and low-fat cottage cheese  Try low-fat cheeses such as mozzarella and other reduced-fat cheeses  · Choose meat and other protein foods that are low in fat  Choose beans or other legumes such as split peas or lentils   Choose fish, skinless poultry (chicken or turkey), or lean cuts of red meat (beef or pork)  Before you cook meat or poultry, cut off any visible fat  · Use less fat and oil  Try baking foods instead of frying them  Add less fat, such as margarine, sour cream, regular salad dressing and mayonnaise to foods  Eat fewer high-fat foods  Some examples of high-fat foods include french fries, doughnuts, ice cream, and cakes  · Eat fewer sweets  Limit foods and drinks that are high in sugar  This includes candy, cookies, regular soda, and sweetened drinks  Exercise:  Exercise at least 30 minutes per day on most days of the week  Some examples of exercise include walking, biking, dancing, and swimming  You can also fit in more physical activity by taking the stairs instead of the elevator or parking farther away from stores  Ask your healthcare provider about the best exercise plan for you  © Copyright [employer] 2018 Information is for End User's use only and may not be sold, redistributed or otherwise used for commercial purposes   All illustrations and images included in CareNotes® are the copyrighted property of [employer]  or [address] [place] [address]

## 2020-02-19 NOTE — PROGRESS NOTES
Subjective:   Chief Complaint   Patient presents with   [facility] Wellness Visit     patient presents for medicare wellness visit    Pre-op Exam     patient presents for Pre-op clearance for Cataract extraction/ IOL-Left eye to be done by surgeon Nathaniel Calero on 03/11/2020 at [facility] center- fax number [phone]        Patient ID: Daryn Pablo is a 77 y o  female  Patient here for preoperative consultation for planned cataract extraction on the left eye  Patient has a history of prolonged PTT  Current evaluation by hematologist done in June of 2019 was inconclusive for the exact nature of any bleeding or clotting disorder  Hematologist felt this was more of a clotting disorder rather than a bleeding disorder  There was a positive lupus anticoagulant which explains a prolonged PTT  This was to be repeated but has not been done as of yet  Patient still has not had any unprovoked clots or bleeding episodes  She is not on and a anticoagulants per hematologist advice  Otherwise generally she is medically stable  Hypertension-good control with current medication  Patient on diuretic there is no edema present  EKG last year was normal       The following portions of the patient's history were reviewed and updated as appropriate: allergies, current medications, past family history, past medical history, past social history, past surgical history and problem list     Review of Systems   Constitutional: Negative for appetite change, chills, diaphoresis and fever  HENT: Negative for ear pain, rhinorrhea, sinus pressure and sore throat  Eyes: Positive for visual disturbance  Negative for discharge, redness and itching  Respiratory: Negative for cough, shortness of breath and wheezing  Cardiovascular: Negative for chest pain and palpitations  Rapid or slow heart rate   Gastrointestinal: Negative for abdominal pain, diarrhea, nausea and vomiting     Psychiatric/Behavioral: Negative for decreased concentration and dysphoric mood  The patient is not nervous/anxious  Objective:  Vitals:    02/19/20 1027   BP: 122/74   BP Location: Right arm   Patient Position: Sitting   Cuff Size: Large   Pulse: 62   Resp: 20   Temp: 98 7 °F (37 1 °C)   TempSrc: Tympanic   SpO2: 98%   Weight: 94 7 kg (208 lb 12 8 oz)   Height: 5' 4" (1 626 m)      Physical Exam   Constitutional: She is oriented to person, place, and time  She appears well-developed and well-nourished  No distress  HENT:   Head: Normocephalic and atraumatic  Right Ear: Tympanic membrane, external ear and ear canal normal    Left Ear: Tympanic membrane, external ear and ear canal normal    Nose: No mucosal edema or rhinorrhea  Right sinus exhibits no maxillary sinus tenderness  Left sinus exhibits no maxillary sinus tenderness  Mouth/Throat: Uvula is midline  Mucous membranes are not pale and not dry  Normal dentition  No oropharyngeal exudate  Eyes: Pupils are equal, round, and reactive to light  EOM are normal  Right eye exhibits no discharge  Left eye exhibits no discharge  Neck: Normal range of motion  Neck supple  No thyromegaly present  Cardiovascular: Normal rate, regular rhythm, normal heart sounds and intact distal pulses  No murmur heard  Pulmonary/Chest: Effort normal and breath sounds normal  No respiratory distress  She has no wheezes  She has no rales  Abdominal: Soft  Bowel sounds are normal  She exhibits no distension  Musculoskeletal: Normal range of motion  She exhibits no edema or tenderness  Lymphadenopathy:     She has no cervical adenopathy  Neurological: She is alert and oriented to person, place, and time  No cranial nerve deficit  Skin: She is not diaphoretic  Psychiatric: She has a normal mood and affect  Her behavior is normal          Assessment/Plan:    No problem-specific Assessment & Plan notes found for this encounter         Diagnoses and all orders for this visit:    Medicare annual wellness visit, subsequent    Pre-op examination    Senile cataract of left eye, unspecified age-related cataract type    Prolonged PTT (partial thromboplastin time)    Anemia, iron deficiency, inadequate dietary intake    Essential hypertension          Patient medically stable and cleared for proposed left eye cataract surgery  Her clotting disorder should not be an issue during cataract surgery  Do not see need to prescribe any anticoagulant  PTT prolongation has not complicated other surgeries

## 2020-02-26 DIAGNOSIS — I10 ESSENTIAL HYPERTENSION: ICD-10-CM

## 2020-02-28 RX ORDER — LISINOPRIL 20 MG/1
TABLET ORAL
Qty: 90 TABLET | Refills: 2 | Status: SHIPPED | OUTPATIENT
Start: 2020-02-28

## 2020-03-23 DIAGNOSIS — F32.A DEPRESSION, UNSPECIFIED DEPRESSION TYPE: ICD-10-CM

## 2020-03-23 DIAGNOSIS — Z79.899 ENCOUNTER FOR LONG-TERM CURRENT USE OF MEDICATION: ICD-10-CM

## 2020-03-23 RX ORDER — CITALOPRAM 10 MG/1
TABLET ORAL
Qty: 180 TABLET | Refills: 0 | Status: SHIPPED | OUTPATIENT
Start: 2020-03-23 | End: 2020-06-29

## 2020-03-23 RX ORDER — PANTOPRAZOLE SODIUM 40 MG/1
TABLET, DELAYED RELEASE ORAL
Qty: 90 TABLET | Refills: 0 | Status: SHIPPED | OUTPATIENT
Start: 2020-03-23 | End: 2020-06-15 | Stop reason: SDUPTHER

## 2020-04-09 DIAGNOSIS — M25.473 ANKLE SWELLING, UNSPECIFIED LATERALITY: ICD-10-CM

## 2020-04-10 RX ORDER — FUROSEMIDE 20 MG/1
TABLET ORAL
Qty: 180 TABLET | Refills: 0 | Status: SHIPPED | OUTPATIENT
Start: 2020-04-10 | End: 2020-05-06 | Stop reason: SDUPTHER

## 2020-05-06 DIAGNOSIS — M25.473 ANKLE SWELLING, UNSPECIFIED LATERALITY: ICD-10-CM

## 2020-05-06 RX ORDER — FUROSEMIDE 20 MG/1
20 TABLET ORAL 2 TIMES DAILY
Qty: 180 TABLET | Refills: 0 | Status: SHIPPED | OUTPATIENT
Start: 2020-05-06 | End: 2020-06-29

## 2020-06-15 ENCOUNTER — OFFICE VISIT (OUTPATIENT)
Dept: FAMILY MEDICINE CLINIC | Facility: CLINIC | Age: 75
End: 2020-06-15
Payer: MEDICARE

## 2020-06-15 VITALS
RESPIRATION RATE: 20 BRPM | HEIGHT: 64 IN | TEMPERATURE: 98.8 F | DIASTOLIC BLOOD PRESSURE: 84 MMHG | WEIGHT: 206 LBS | OXYGEN SATURATION: 98 % | SYSTOLIC BLOOD PRESSURE: 128 MMHG | HEART RATE: 69 BPM | BODY MASS INDEX: 35.17 KG/M2

## 2020-06-15 DIAGNOSIS — M79.2 NEURALGIA: ICD-10-CM

## 2020-06-15 DIAGNOSIS — E66.01 MORBID OBESITY DUE TO EXCESS CALORIES (HCC): ICD-10-CM

## 2020-06-15 DIAGNOSIS — Z79.899 ENCOUNTER FOR LONG-TERM CURRENT USE OF MEDICATION: ICD-10-CM

## 2020-06-15 DIAGNOSIS — D68.9 COAGULOPATHY (HCC): ICD-10-CM

## 2020-06-15 DIAGNOSIS — S60.561A INSECT BITE OF RIGHT HAND, INITIAL ENCOUNTER: Primary | ICD-10-CM
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
## 2020-06-15 PROCEDURE — 3074F SYST BP LT 130 MM HG: CPT | Performed by: FAMILY MEDICINE

## 2020-06-15 PROCEDURE — 99214 OFFICE O/P EST MOD 30 MIN: CPT | Performed by: FAMILY MEDICINE

## 2020-06-15 PROCEDURE — 1036F TOBACCO NON-USER: CPT | Performed by: FAMILY MEDICINE

## 2020-06-15 PROCEDURE — 4040F PNEUMOC VAC/ADMIN/RCVD: CPT | Performed by: FAMILY MEDICINE

## 2020-06-15 PROCEDURE — 1160F RVW MEDS BY RX/DR IN RCRD: CPT | Performed by: FAMILY MEDICINE

## 2020-06-15 PROCEDURE — 3079F DIAST BP 80-89 MM HG: CPT | Performed by: FAMILY MEDICINE

## 2020-06-15 PROCEDURE — 3008F BODY MASS INDEX DOCD: CPT | Performed by: FAMILY MEDICINE

## 2020-06-15 RX ORDER — PANTOPRAZOLE SODIUM 40 MG/1
40 TABLET, DELAYED RELEASE ORAL DAILY
Qty: 90 TABLET | Refills: 1 | Status: SHIPPED | OUTPATIENT
Start: 2020-06-15

## 2020-06-15 RX ORDER — GABAPENTIN 100 MG/1
100 CAPSULE ORAL 3 TIMES DAILY
Qty: 30 CAPSULE | Refills: 0 | Status: SHIPPED | OUTPATIENT
Start: 2020-06-15

## 2020-06-29 DIAGNOSIS — M25.473 ANKLE SWELLING, UNSPECIFIED LATERALITY: ICD-10-CM

## 2020-06-29 DIAGNOSIS — F32.A DEPRESSION, UNSPECIFIED DEPRESSION TYPE: ICD-10-CM

## 2020-06-29 RX ORDER — FUROSEMIDE 20 MG/1
TABLET ORAL
Qty: 180 TABLET | Refills: 0 | Status: SHIPPED | OUTPATIENT
Start: 2020-06-29 | End: 2020-10-17

## 2020-06-29 RX ORDER — CITALOPRAM 10 MG/1
TABLET ORAL
Qty: 180 TABLET | Refills: 0 | Status: SHIPPED | OUTPATIENT
Start: 2020-06-29 | End: 2020-10-17

## 2020-10-17 DIAGNOSIS — M25.473 ANKLE SWELLING, UNSPECIFIED LATERALITY: ICD-10-CM

## 2020-10-17 DIAGNOSIS — F32.A DEPRESSION, UNSPECIFIED DEPRESSION TYPE: ICD-10-CM

## 2020-10-17 RX ORDER — CITALOPRAM 10 MG/1
TABLET ORAL
Qty: 180 TABLET | Refills: 0 | Status: SHIPPED | OUTPATIENT
Start: 2020-10-17

## 2020-10-17 RX ORDER — FUROSEMIDE 20 MG/1
TABLET ORAL
Qty: 180 TABLET | Refills: 0 | Status: SHIPPED | OUTPATIENT
Start: 2020-10-17

## 2020-11-25 ENCOUNTER — TELEPHONE (OUTPATIENT)
Dept: FAMILY MEDICINE CLINIC | Facility: CLINIC | Age: 75
End: 2020-11-25

## 2021-01-07 ENCOUNTER — TELEPHONE (OUTPATIENT)
Dept: OBGYN CLINIC | Facility: HOSPITAL | Age: 76
End: 2021-01-07

## 2021-01-07 NOTE — TELEPHONE ENCOUNTER
patient is calling stating that she has to have some medical testing done and she is wondering if the titanium that was put in for her hip replacement would cause any issues with CT or MRI scans  She is also wondering if there is any metal besides titanium in her hip

## 2021-01-08 NOTE — TELEPHONE ENCOUNTER
The hip should cause no issues with CT or MRI except it may make it a bit hard to see tissue close to the hip replacement    Her hip, I believe, has cobalt chromium as the metal

## 2021-01-08 NOTE — TELEPHONE ENCOUNTER
Patient called back to check on the status of this  Advised patient of message below and patient verbalized understanding  She wanted to let Dr Monica Yates know she will never forget her for saving her life for finding an issue in her blood and would just like to say thank you

## 2021-02-11 ENCOUNTER — VBI (OUTPATIENT)
Dept: ADMINISTRATIVE | Facility: OTHER | Age: 76
End: 2021-02-11

## (undated) DEVICE — DRAPE SHEET THREE QUARTER

## (undated) DEVICE — GLOVE INDICATOR PI UNDERGLOVE SZ 7 BLUE

## (undated) DEVICE — PENCIL ELECTROSURG E-Z CLEAN -0035H

## (undated) DEVICE — BIPOLAR SEALER 23-301-1 AQM MBS: Brand: AQUAMANTYS™

## (undated) DEVICE — 1200CC GUARDIAN II: Brand: GUARDIAN

## (undated) DEVICE — PREP SURGICAL PURPREP 26ML

## (undated) DEVICE — SUT ETHIBOND 5 V-37 30 IN MB66G

## (undated) DEVICE — COMFORT HOOD -75 EA/BX: Brand: CARDINAL HEALTH

## (undated) DEVICE — GLOVE SRG BIOGEL 7

## (undated) DEVICE — ABDUCTION PILLOW FOAM POSITIONER: Brand: CARDINAL HEALTH

## (undated) DEVICE — SYRINGE 30ML LL

## (undated) DEVICE — SUT STRATAFIX SPIRAL 3-0 PGA/PCL 30 X 30 CM SXMD2B408

## (undated) DEVICE — 3M™ STERI-STRIP™ REINFORCED ADHESIVE SKIN CLOSURES, R1547, 1/2 IN X 4 IN (12 MM X 100 MM), 6 STRIPS/ENVELOPE: Brand: 3M™ STERI-STRIP™

## (undated) DEVICE — SUT VICRYL 0 CT-1 27 IN J260H

## (undated) DEVICE — BLADE INTREX LRG BONE OSCILLATING

## (undated) DEVICE — MEDI-VAC YANKAUER SUCTION HANDLE W/BULBOUS AND CONTROL VENT: Brand: CARDINAL HEALTH

## (undated) DEVICE — IMPERVIOUS STOCKINETTE: Brand: DEROYAL

## (undated) DEVICE — 2000CC GUARDIAN II: Brand: GUARDIAN

## (undated) DEVICE — GLOVE SRG BIOGEL ORTHOPEDIC 7

## (undated) DEVICE — ARTHROSCOPY FLOOR MAT

## (undated) DEVICE — THE SIMPULSE SOLO SYSTEM WITH ULTREX RETRACTABLE SPLASH SHIELD TIP: Brand: SIMPULSE SOLO

## (undated) DEVICE — NEEDLE HYPO 22G X 1-1/2 IN

## (undated) DEVICE — 5065 IMPAD REGULAR PAIR: Brand: A-V IMPULSE

## (undated) DEVICE — TUBING SUCTION 5MM X 12 FT

## (undated) DEVICE — SINGLE-USE BIOPSY FORCEPS: Brand: RADIAL JAW 4

## (undated) DEVICE — PACK MAJOR ORTHO W/SPLITS PBDS

## (undated) DEVICE — HEAVY DUTY TABLE COVER: Brand: CONVERTORS

## (undated) DEVICE — HEWSON SUTURE RETRIEVER: Brand: HEWSON SUTURE RETRIEVER

## (undated) DEVICE — LUBRICANT SURGILUBE TUBE 4 OZ  FLIP TOP

## (undated) DEVICE — CAPIT HIP COP -CERAMIC ON POLY

## (undated) DEVICE — BITE BLOCK ADULT 11FR OMNI BLOC

## (undated) DEVICE — INTENDED FOR TISSUE SEPARATION, AND OTHER PROCEDURES THAT REQUIRE A SHARP SURGICAL BLADE TO PUNCTURE OR CUT.: Brand: BARD-PARKER SAFETY BLADES SIZE 10, STERILE

## (undated) DEVICE — 3M™ IOBAN™ 2 ANTIMICROBIAL INCISE DRAPE 6650EZ: Brand: IOBAN™ 2

## (undated) DEVICE — SUT VICRYL 2-0 CT-1 27 IN J259H

## (undated) DEVICE — BRUSH ENDO CLEANING DBL-HEADER

## (undated) DEVICE — GLOVE INDICATOR PI UNDERGLOVE SZ 7.5 BLUE

## (undated) DEVICE — COBAN 6 IN STERILE

## (undated) DEVICE — NEEDLE 18 G X 1 1/2

## (undated) DEVICE — STANDARD SURGICAL GOWN, L: Brand: CONVERTORS

## (undated) DEVICE — OCCLUSIVE GAUZE STRIP,3% BISMUTH TRIBROMOPHENATE IN PETROLATUM BLEND: Brand: XEROFORM

## (undated) DEVICE — SYRINGE 20ML LL

## (undated) DEVICE — YELLOW BOAT

## (undated) DEVICE — 3M™ STERI-DRAPE™ U-DRAPE 1015: Brand: STERI-DRAPE™

## (undated) DEVICE — ELECTRODE BLADE MOD E-Z CLEAN 4IN -0014AM

## (undated) DEVICE — SYRINGE 50ML LL